# Patient Record
Sex: FEMALE | Race: WHITE | NOT HISPANIC OR LATINO | Employment: FULL TIME | URBAN - METROPOLITAN AREA
[De-identification: names, ages, dates, MRNs, and addresses within clinical notes are randomized per-mention and may not be internally consistent; named-entity substitution may affect disease eponyms.]

---

## 2015-08-07 LAB
CHOLESTEROL, TOTAL: 191
HDLC SERPL-MCNC: 51 MG/DL
LDLC SERPL CALC-MCNC: 115 MG/DL
TRIGLYCERIDE (LIPID PAN): 127

## 2018-09-19 ENCOUNTER — OFFICE VISIT (OUTPATIENT)
Dept: INTERNAL MEDICINE | Facility: CLINIC | Age: 53
End: 2018-09-19
Payer: COMMERCIAL

## 2018-09-19 VITALS
SYSTOLIC BLOOD PRESSURE: 120 MMHG | TEMPERATURE: 98 F | HEIGHT: 61 IN | BODY MASS INDEX: 27.06 KG/M2 | DIASTOLIC BLOOD PRESSURE: 76 MMHG | WEIGHT: 143.31 LBS | HEART RATE: 64 BPM

## 2018-09-19 DIAGNOSIS — Z00.00 ROUTINE GENERAL MEDICAL EXAMINATION AT A HEALTH CARE FACILITY: Primary | ICD-10-CM

## 2018-09-19 PROCEDURE — 99386 PREV VISIT NEW AGE 40-64: CPT | Mod: S$GLB,,, | Performed by: FAMILY MEDICINE

## 2018-09-19 PROCEDURE — 99999 PR PBB SHADOW E&M-NEW PATIENT-LVL III: CPT | Mod: PBBFAC,,, | Performed by: FAMILY MEDICINE

## 2018-09-19 RX ORDER — MINERAL OIL
180 ENEMA (ML) RECTAL DAILY
COMMUNITY

## 2018-09-19 RX ORDER — ESTRADIOL 1 MG/1
1 TABLET ORAL DAILY
COMMUNITY
End: 2022-08-17 | Stop reason: ALTCHOICE

## 2018-09-19 NOTE — PROGRESS NOTES
Subjective:      Patient ID: Alma Vivar is a 52 y.o. female.    Chief Complaint: Establish Care    Disclaimer:  This note is prepared using voice recognition software and as such is likely to have errors and has not been proof read. Please contact me for questions.     Alma Vivar is a 52 y.o. female who presents today to establish care. Recently moved from [place]. Works as  and travels a lot and moves a lot. Has met with Dr Hailee Laureano last year for gyn and to do HRT. Had fatigue and weight gain so did replacement therapy. Did tsh and cbc last week through [facility] with her. Doesn't have results yet. Did colonoscopy about 2 yrs ago and had polyps and due for repeat next year. Had 3 yr interval.  Did mammo and pap with Dr Laureano. Did thyroid testing there also and anemia but no results back. Back exercising crossfit. Recently go yellow fever, hep a and Tdap update to travel with [place] through company at work on 8/2018.         No results found for: WBC, HGB, HCT, PLT, CHOL, TRIG, HDL, LDLDIRECT, ALT, AST, NA, K, CL, CREATININE, BUN, CO2, TSH, PSA, INR, GLUF, HGBA1C, MICROALBUR    Review of Systems   Constitutional: Positive for activity change, fatigue and unexpected weight change. Negative for appetite change, chills and fever.   HENT: Negative for ear pain and trouble swallowing.    Eyes: Negative for pain and visual disturbance.   Respiratory: Negative for cough and shortness of breath.    Cardiovascular: Negative for chest pain and leg swelling.   Gastrointestinal: Negative for abdominal pain, blood in stool, nausea and vomiting.   Endocrine: Negative for cold intolerance and heat intolerance.   Genitourinary: Negative for dysuria and frequency.   Musculoskeletal: Negative for joint swelling, myalgias and neck pain.   Skin: Negative for color change and rash.   Neurological: Negative for dizziness and headaches.   Psychiatric/Behavioral: Negative for behavioral problems and sleep  "disturbance.     Objective:     Vitals:    09/19/18 0751   BP: 120/76   Pulse: 64   Temp: 98 °F (36.7 °C)   TempSrc: Tympanic   Weight: 65 kg (143 lb 4.8 oz)   Height: 5' 1" (1.549 m)     Physical Exam   Constitutional: She is oriented to person, place, and time. She appears well-developed and well-nourished.   HENT:   Head: Normocephalic and atraumatic.   Right Ear: External ear normal.   Left Ear: External ear normal.   Mouth/Throat: Oropharynx is clear and moist.   Eyes: EOM are normal.   Neck: Normal range of motion. Neck supple. No thyromegaly present.   Cardiovascular: Normal rate and regular rhythm. Exam reveals no gallop and no friction rub.   No murmur heard.  Pulmonary/Chest: Effort normal. No respiratory distress. She has no wheezes. She has no rales.   Abdominal: Soft. Bowel sounds are normal. She exhibits no distension. There is no tenderness. There is no rebound.   Musculoskeletal: Normal range of motion. She exhibits no edema.   Lymphadenopathy:     She has no cervical adenopathy.   Neurological: She is alert and oriented to person, place, and time.   Skin: Skin is warm and dry. No rash noted.   Psychiatric: She has a normal mood and affect. Her behavior is normal. Judgment and thought content normal.   Vitals reviewed.    Assessment:     1. Routine general medical examination at a health care facility      Plan:   Alma was seen today for establish care.    Diagnoses and all orders for this visit:    Routine general medical examination at a health care facility - labs ordered through [facility]. Await results. Get copies of old records and mammo through Dr Laureano's office. . Discussed Health Maintenance issues.     -     Lipid panel; Future  -     Comprehensive metabolic panel; Future  -     Hemoglobin A1c; Future  -     Lipid panel  -     Comprehensive metabolic panel  -     Hemoglobin A1c            Follow-up in about 1 year (around 9/19/2019) for physical with Dr THAYER.                "

## 2018-09-21 DIAGNOSIS — Z12.39 BREAST CANCER SCREENING: ICD-10-CM

## 2018-10-09 ENCOUNTER — TELEPHONE (OUTPATIENT)
Dept: INTERNAL MEDICINE | Facility: CLINIC | Age: 53
End: 2018-10-09

## 2018-10-09 NOTE — TELEPHONE ENCOUNTER
patient dropped off some med records placed for Dr Claudette kelley.      By Jose Monsivais MA

## 2018-11-06 ENCOUNTER — TELEPHONE (OUTPATIENT)
Dept: INTERNAL MEDICINE | Facility: CLINIC | Age: 53
End: 2018-11-06

## 2018-11-06 NOTE — TELEPHONE ENCOUNTER
----- Message from Almaz Castillo sent at 11/6/2018 10:10 AM CST -----  Contact: self [phone]  Would like to be worked in for an appointment this week with Dr. Wilkins for joint pain and swelling.  Please call back at [phone].  Md Morena

## 2018-11-07 ENCOUNTER — OFFICE VISIT (OUTPATIENT)
Dept: INTERNAL MEDICINE | Facility: CLINIC | Age: 53
End: 2018-11-07
Payer: COMMERCIAL

## 2018-11-07 ENCOUNTER — HOSPITAL ENCOUNTER (OUTPATIENT)
Dept: RADIOLOGY | Facility: HOSPITAL | Age: 53
Discharge: HOME | End: 2018-11-07
Attending: NURSE PRACTITIONER
Payer: COMMERCIAL

## 2018-11-07 VITALS
RESPIRATION RATE: 16 BRPM | HEART RATE: 76 BPM | WEIGHT: 142 LBS | TEMPERATURE: 97 F | SYSTOLIC BLOOD PRESSURE: 130 MMHG | DIASTOLIC BLOOD PRESSURE: 80 MMHG | HEIGHT: 61 IN | BODY MASS INDEX: 26.81 KG/M2

## 2018-11-07 DIAGNOSIS — M25.50 PAIN IN JOINT INVOLVING MULTIPLE SITES: ICD-10-CM

## 2018-11-07 DIAGNOSIS — M25.50 PAIN IN JOINT INVOLVING MULTIPLE SITES: Primary | ICD-10-CM

## 2018-11-07 PROCEDURE — 3008F BODY MASS INDEX DOCD: CPT | Mod: CPTII,S$GLB,, | Performed by: NURSE PRACTITIONER

## 2018-11-07 PROCEDURE — 99214 OFFICE O/P EST MOD 30 MIN: CPT | Mod: S$GLB,,, | Performed by: NURSE PRACTITIONER

## 2018-11-07 PROCEDURE — 73130 X-RAY EXAM OF HAND: CPT | Mod: TC,FY,PO,LT

## 2018-11-07 PROCEDURE — 99999 PR PBB SHADOW E&M-EST. PATIENT-LVL III: CPT | Mod: PBBFAC,,, | Performed by: NURSE PRACTITIONER

## 2018-11-07 PROCEDURE — 73130 X-RAY EXAM OF HAND: CPT | Mod: 26,LT,, | Performed by: RADIOLOGY

## 2018-11-07 NOTE — PROGRESS NOTES
"Subjective:       Patient ID: Alma Vivar is a 52 y.o. female.    Chief Complaint: Pain (joint pain)    Patient comes in today with concern of multiple joint pains, for the last several days. She also has had hand pain bilaterally for about a month, left worse than right. She admits to having gained about 15 pounds over the last few years. Her hands have become swollen over the last few months so much so that she is not able to wear her rings. She had to have her wedding ring cut off. The left hand feels stiff with decreased  and strength. She tried ibuprofen with no improvement.         /80 (BP Location: Left arm, Patient Position: Sitting, BP Method: Medium (Automatic))   Pulse 76   Temp 96.6 °F (35.9 °C) (Tympanic)   Resp 16   Ht 5' 1" (1.549 m)   Wt 64.4 kg (141 lb 15.6 oz)   BMI 26.83 kg/m²     Review of Systems   Constitutional: Negative for activity change, appetite change, chills, diaphoresis, fatigue, fever and unexpected weight change.   HENT: Negative.    Eyes: Negative for visual disturbance.   Respiratory: Negative for cough, shortness of breath and wheezing.    Cardiovascular: Negative for chest pain, palpitations and leg swelling.   Gastrointestinal: Negative for abdominal distention, abdominal pain, blood in stool, constipation, diarrhea, nausea and vomiting.   Genitourinary: Negative for decreased urine volume, difficulty urinating, dysuria, frequency, hematuria and urgency.   Musculoskeletal: Positive for arthralgias and joint swelling (hands). Negative for back pain, gait problem, myalgias, neck pain and neck stiffness.   Skin: Negative for color change, pallor, rash and wound.   Neurological: Negative.  Negative for dizziness, syncope, speech difficulty, light-headedness and headaches.   Psychiatric/Behavioral: Negative for agitation, confusion and hallucinations. The patient is not nervous/anxious.        Objective:      Physical Exam   Constitutional: She is oriented " to person, place, and time. She appears well-developed and well-nourished. She is cooperative. No distress.   HENT:   Head: Normocephalic and atraumatic.   Eyes: Conjunctivae are normal. Right eye exhibits no discharge. Left eye exhibits no discharge.   Cardiovascular: Normal rate, regular rhythm and normal heart sounds.   No murmur heard.  Pulmonary/Chest: Effort normal and breath sounds normal. No respiratory distress. She has no wheezes. She has no rales. She exhibits no tenderness.   Abdominal: Soft. She exhibits no distension.   Musculoskeletal:   Bilateral hands with metacarpal pain, ttp. There is decreased  strength to the left compared to right. Fingers with mild swelling. There is generalized tenderness to the wrists, shoulders, knees, elbows. No obvious joint swelling besides the hand.   Neurological: She is alert and oriented to person, place, and time.   Skin: Skin is warm and dry. No rash noted. She is not diaphoretic.   Psychiatric: She has a normal mood and affect. Her behavior is normal. Judgment and thought content normal.   Nursing note and vitals reviewed.      Assessment:       1. Pain in joint involving multiple sites        Plan:       Alma was seen today for pain.    Diagnoses and all orders for this visit:    Pain in joint involving multiple sites  -     SAM; Future  -     Comprehensive metabolic panel; Future  -     C-reactive protein; Future  -     Rheumatoid factor; Future  -     Sedimentation rate; Future  -     Uric acid; Future  -     X-Ray Hand Complete Left; Future    will do a autoimmune work up to assess for inflammatory joint diseases.  Xray left hand today as it has the greatest pain to r/o joint erosions/arthritis/degenerative changes.  Ibuprofen prn  Regular exercise  Weight loss  Will call with results.

## 2019-03-04 ENCOUNTER — OFFICE VISIT (OUTPATIENT)
Dept: INTERNAL MEDICINE | Facility: CLINIC | Age: 54
End: 2019-03-04
Payer: COMMERCIAL

## 2019-03-04 VITALS
HEART RATE: 64 BPM | SYSTOLIC BLOOD PRESSURE: 118 MMHG | BODY MASS INDEX: 24.53 KG/M2 | TEMPERATURE: 97 F | DIASTOLIC BLOOD PRESSURE: 70 MMHG | HEIGHT: 63 IN | WEIGHT: 138.44 LBS | RESPIRATION RATE: 18 BRPM

## 2019-03-04 DIAGNOSIS — R05.9 COUGH: ICD-10-CM

## 2019-03-04 DIAGNOSIS — J01.40 ACUTE NON-RECURRENT PANSINUSITIS: Primary | ICD-10-CM

## 2019-03-04 PROCEDURE — 96372 THER/PROPH/DIAG INJ SC/IM: CPT | Mod: S$GLB,,, | Performed by: NURSE PRACTITIONER

## 2019-03-04 PROCEDURE — 99999 PR PBB SHADOW E&M-EST. PATIENT-LVL IV: ICD-10-PCS | Mod: PBBFAC,,, | Performed by: NURSE PRACTITIONER

## 2019-03-04 PROCEDURE — 99999 PR PBB SHADOW E&M-EST. PATIENT-LVL IV: CPT | Mod: PBBFAC,,, | Performed by: NURSE PRACTITIONER

## 2019-03-04 PROCEDURE — 99214 OFFICE O/P EST MOD 30 MIN: CPT | Mod: 25,S$GLB,, | Performed by: NURSE PRACTITIONER

## 2019-03-04 PROCEDURE — 99214 PR OFFICE/OUTPT VISIT, EST, LEVL IV, 30-39 MIN: ICD-10-PCS | Mod: 25,S$GLB,, | Performed by: NURSE PRACTITIONER

## 2019-03-04 PROCEDURE — 3008F PR BODY MASS INDEX (BMI) DOCUMENTED: ICD-10-PCS | Mod: CPTII,S$GLB,, | Performed by: NURSE PRACTITIONER

## 2019-03-04 PROCEDURE — 96372 PR INJECTION,THERAP/PROPH/DIAG2ST, IM OR SUBCUT: ICD-10-PCS | Mod: S$GLB,,, | Performed by: NURSE PRACTITIONER

## 2019-03-04 PROCEDURE — 3008F BODY MASS INDEX DOCD: CPT | Mod: CPTII,S$GLB,, | Performed by: NURSE PRACTITIONER

## 2019-03-04 RX ORDER — BETAMETHASONE SODIUM PHOSPHATE AND BETAMETHASONE ACETATE 3; 3 MG/ML; MG/ML
12 INJECTION, SUSPENSION INTRA-ARTICULAR; INTRALESIONAL; INTRAMUSCULAR; SOFT TISSUE ONCE
Status: COMPLETED | OUTPATIENT
Start: 2019-03-04 | End: 2019-03-04

## 2019-03-04 RX ORDER — FLUTICASONE PROPIONATE 50 MCG
2 SPRAY, SUSPENSION (ML) NASAL DAILY
Qty: 1 BOTTLE | Refills: 0 | Status: SHIPPED | OUTPATIENT
Start: 2019-03-04 | End: 2019-05-21

## 2019-03-04 RX ORDER — AMOXICILLIN AND CLAVULANATE POTASSIUM 875; 125 MG/1; MG/1
1 TABLET, FILM COATED ORAL 2 TIMES DAILY
Qty: 20 TABLET | Refills: 0 | Status: SHIPPED | OUTPATIENT
Start: 2019-03-04 | End: 2019-03-14

## 2019-03-04 RX ORDER — PROMETHAZINE HYDROCHLORIDE AND DEXTROMETHORPHAN HYDROBROMIDE 6.25; 15 MG/5ML; MG/5ML
5 SYRUP ORAL NIGHTLY PRN
Qty: 120 ML | Refills: 0 | Status: SHIPPED | OUTPATIENT
Start: 2019-03-04 | End: 2019-05-21

## 2019-03-04 RX ADMIN — BETAMETHASONE SODIUM PHOSPHATE AND BETAMETHASONE ACETATE 12 MG: 3; 3 INJECTION, SUSPENSION INTRA-ARTICULAR; INTRALESIONAL; INTRAMUSCULAR; SOFT TISSUE at 09:03

## 2019-03-04 NOTE — PATIENT INSTRUCTIONS
Cristina Serrano  Start Flonase Nasal Steroid Spray 2 sprays each nare daily to decrease the sinus inflammation and help dry the post nasal drip.  Drink lots of fluids  Afrin nose spray 30 and 15 min before flight

## 2019-03-04 NOTE — PROGRESS NOTES
"Subjective:       Patient ID: Alma Vivar is a 53 y.o. female.    Chief Complaint: URI    URI    This is a new problem. The current episode started in the past 7 days. The problem has been gradually worsening. There has been no fever. Associated symptoms include congestion, coughing, headaches, rhinorrhea, sinus pain, sneezing and a sore throat. Pertinent negatives include no abdominal pain, chest pain, diarrhea, dysuria, ear pain, joint pain, joint swelling, nausea, neck pain, plugged ear sensation, rash, swollen glands, vomiting or wheezing. Treatments tried: allegra daily, otc mucinex night. The treatment provided no relief.       /70 (BP Location: Right arm, Patient Position: Sitting, BP Method: Medium (Automatic))   Pulse 64   Temp 97.3 °F (36.3 °C) (Tympanic)   Resp 18   Ht 5' 3" (1.6 m)   Wt 62.8 kg (138 lb 7.2 oz)   BMI 24.53 kg/m²     Review of Systems   Constitutional: Negative for activity change, appetite change, chills, diaphoresis, fatigue, fever and unexpected weight change.   HENT: Positive for congestion, postnasal drip, rhinorrhea, sinus pressure, sinus pain, sneezing and sore throat. Negative for dental problem, drooling, ear discharge, ear pain, hearing loss, mouth sores, nosebleeds, tinnitus, trouble swallowing and voice change.    Eyes: Negative for pain, discharge and redness.   Respiratory: Positive for cough. Negative for choking, chest tightness, shortness of breath and wheezing.    Cardiovascular: Negative for chest pain, palpitations and leg swelling.   Gastrointestinal: Negative for abdominal pain, diarrhea, nausea and vomiting.   Endocrine: Negative for cold intolerance and heat intolerance.   Genitourinary: Negative for dysuria.   Musculoskeletal: Negative for arthralgias, joint pain, joint swelling, myalgias and neck pain.   Skin: Negative for rash.   Allergic/Immunologic: Positive for environmental allergies. Negative for food allergies and immunocompromised " state.   Neurological: Positive for headaches. Negative for syncope and light-headedness.       Objective:      Physical Exam   Constitutional: She is oriented to person, place, and time. She appears well-developed and well-nourished. No distress.   HENT:   Head: Normocephalic and atraumatic.   Right Ear: Tympanic membrane, external ear and ear canal normal.   Left Ear: Tympanic membrane, external ear and ear canal normal.   Nose: Mucosal edema and rhinorrhea present. Right sinus exhibits maxillary sinus tenderness and frontal sinus tenderness. Left sinus exhibits maxillary sinus tenderness and frontal sinus tenderness.   Mouth/Throat: Uvula is midline, oropharynx is clear and moist and mucous membranes are normal. No oropharyngeal exudate, posterior oropharyngeal edema or posterior oropharyngeal erythema.   Eyes: Conjunctivae are normal. Right eye exhibits no discharge. Left eye exhibits no discharge.   Neck: Normal range of motion.   Cardiovascular: Normal rate, regular rhythm and normal heart sounds.   No murmur heard.  Pulmonary/Chest: Effort normal and breath sounds normal. No accessory muscle usage. No respiratory distress. She has no decreased breath sounds. She has no wheezes. She has no rhonchi. She has no rales. She exhibits no tenderness.   Abdominal: Soft. She exhibits no distension.   Musculoskeletal: Normal range of motion.   Neurological: She is alert and oriented to person, place, and time.   Skin: Skin is warm and dry. She is not diaphoretic.   Psychiatric: She has a normal mood and affect. Her behavior is normal.   Nursing note and vitals reviewed.      Assessment:       1. Acute non-recurrent pansinusitis    2. Cough        Plan:       Alma was seen today for uri.    Diagnoses and all orders for this visit:    Acute non-recurrent pansinusitis  -     amoxicillin-clavulanate 875-125mg (AUGMENTIN) 875-125 mg per tablet; Take 1 tablet by mouth 2 (two) times daily. for 10 days  -     betamethasone  acetate-betamethasone sodium phosphate injection 12 mg  -     fluticasone (FLONASE) 50 mcg/actuation nasal spray; 2 sprays (100 mcg total) by Each Nare route once daily.    Cough  -     promethazine-dextromethorphan (PROMETHAZINE-DM) 6.25-15 mg/5 mL Syrp; Take 5 mLs by mouth nightly as needed (Cough).      Patient Instructions   Cristina Serrano  Start Flonase Nasal Steroid Spray 2 sprays each nare daily to decrease the sinus inflammation and help dry the post nasal drip.  Drink lots of fluids  Afrin nose spray 30 and 15 min before flight     If symptoms worsen or fail to improve with treatment, see your Primary Care Provider or go to the nearest Emergency Room.

## 2019-05-21 ENCOUNTER — OFFICE VISIT (OUTPATIENT)
Dept: INTERNAL MEDICINE | Facility: CLINIC | Age: 54
End: 2019-05-21
Payer: COMMERCIAL

## 2019-05-21 VITALS
TEMPERATURE: 98 F | HEART RATE: 72 BPM | BODY MASS INDEX: 25.64 KG/M2 | DIASTOLIC BLOOD PRESSURE: 66 MMHG | SYSTOLIC BLOOD PRESSURE: 118 MMHG | WEIGHT: 135.81 LBS | HEIGHT: 61 IN

## 2019-05-21 DIAGNOSIS — J30.9 ALLERGIC RHINITIS, UNSPECIFIED SEASONALITY, UNSPECIFIED TRIGGER: ICD-10-CM

## 2019-05-21 DIAGNOSIS — R06.83 SNORING: ICD-10-CM

## 2019-05-21 DIAGNOSIS — R29.898 LEFT HAND WEAKNESS: Primary | ICD-10-CM

## 2019-05-21 DIAGNOSIS — E55.9 VITAMIN D DEFICIENCY: ICD-10-CM

## 2019-05-21 DIAGNOSIS — M79.89 SWELLING OF LEFT HAND: ICD-10-CM

## 2019-05-21 PROCEDURE — 3008F BODY MASS INDEX DOCD: CPT | Mod: CPTII,S$GLB,, | Performed by: FAMILY MEDICINE

## 2019-05-21 PROCEDURE — 99214 OFFICE O/P EST MOD 30 MIN: CPT | Mod: S$GLB,,, | Performed by: FAMILY MEDICINE

## 2019-05-21 PROCEDURE — 99214 PR OFFICE/OUTPT VISIT, EST, LEVL IV, 30-39 MIN: ICD-10-PCS | Mod: S$GLB,,, | Performed by: FAMILY MEDICINE

## 2019-05-21 PROCEDURE — 3008F PR BODY MASS INDEX (BMI) DOCUMENTED: ICD-10-PCS | Mod: CPTII,S$GLB,, | Performed by: FAMILY MEDICINE

## 2019-05-21 PROCEDURE — 99999 PR PBB SHADOW E&M-EST. PATIENT-LVL III: CPT | Mod: PBBFAC,,, | Performed by: FAMILY MEDICINE

## 2019-05-21 PROCEDURE — 99999 PR PBB SHADOW E&M-EST. PATIENT-LVL III: ICD-10-PCS | Mod: PBBFAC,,, | Performed by: FAMILY MEDICINE

## 2019-05-21 NOTE — PROGRESS NOTES
Subjective:      Patient ID: Alma Vivar is a 53 y.o. female.    Chief Complaint: Wrist Pain (left ) and Snoring (has gotten worse over last year)    Disclaimer:  This note is prepared using voice recognition software and as such is likely to have errors and has not been proof read. Please contact me for questions.     Patient is coming in today for few issues.  Left wrist pain and reduced strength in left wrist.  Sometimes joint swelling in particluar left hand but is right handed.   and does a lot of computer work.  Recently had blood work done no evidence of inflammatory arthropathies.  Also had an x-ray which just showed some mild degenerative changes at the 1st metacarpophalangeal joint.  No bony erosion changes.  Really feels more that she just has the reduced strength in .  No numbness.  Sometimes though she does note some swelling in her hands.  She even had to take a wedding ring off.  At times it will just ache.  Which she took ibuprofen it really got no better.  She did not use any ice or heat.  It is more of an annoyance now and she is concerned about the decreased  strength.  No prior injuries to the neck or the shoulder.    Also reports that she has a lot of snoring.  It has gotten worse over the last year to the point that her  had to sleep in different beds.  Before she had allergies all the time and she would wake up at night even a lot of symptoms but since moving from [place] to here she has not had as many allergy issues.  She has gained some weight after having her hysterectomy so she does this contributes to some.  She also recently started switching from sleeping on her side to her back as well.  This does cause more snoring issues as well.      Lab Results   Component Value Date    ALT 30 11/07/2018    AST 20 11/07/2018     11/07/2018    K 3.6 11/07/2018     11/07/2018    CREATININE 0.9 11/07/2018    BUN 12 11/07/2018    CO2 27 11/07/2018  "      X-Ray Hand Complete Left  Narrative: EXAMINATION:  XR HAND COMPLETE 3 VIEW LEFT    CLINICAL HISTORY:  . Pain in unspecified joint    TECHNIQUE:  PA, lateral, and oblique views of the left hand were performed.    COMPARISON:  None    FINDINGS:  No acute osseous abnormality.  Minimal degenerative changes at the 1st carpometacarpal joint.  No osseous erosions.  Soft tissues are unremarkable.  Impression: As above    Electronically signed by: Reji Duggan MD  Date:    11/07/2018  Time:    14:38        Review of Systems   Constitutional: Positive for unexpected weight change. Negative for activity change, appetite change and fatigue.   HENT: Positive for rhinorrhea. Negative for congestion, postnasal drip, sinus pressure, sinus pain, sneezing, sore throat, trouble swallowing and voice change.    Eyes: Negative for discharge.   Respiratory: Negative for apnea, cough, choking, shortness of breath and wheezing.    Cardiovascular: Negative for chest pain and palpitations.   Gastrointestinal: Negative for constipation, diarrhea and vomiting.   Genitourinary: Negative for difficulty urinating and hematuria.   Musculoskeletal: Positive for arthralgias and joint swelling. Negative for myalgias, neck pain and neck stiffness.        Left hand weakness   Neurological: Negative for headaches.   Psychiatric/Behavioral: Positive for sleep disturbance. Negative for behavioral problems and dysphoric mood.     Objective:     Vitals:    05/21/19 1035   BP: 118/66   Pulse: 72   Temp: 97.6 °F (36.4 °C)   TempSrc: Tympanic   Weight: 61.6 kg (135 lb 12.9 oz)   Height: 5' 1" (1.549 m)     Physical Exam   Constitutional: She is oriented to person, place, and time. She appears well-developed and well-nourished.   HENT:   Head: Normocephalic and atraumatic.   Right Ear: Tympanic membrane normal.   Left Ear: Tympanic membrane normal.   Nose: Mucosal edema and rhinorrhea present. No septal deviation. Right sinus exhibits no maxillary sinus " tenderness and no frontal sinus tenderness. Left sinus exhibits no maxillary sinus tenderness and no frontal sinus tenderness.   Mouth/Throat: Uvula is midline, oropharynx is clear and moist and mucous membranes are normal. No posterior oropharyngeal edema or posterior oropharyngeal erythema.   Eyes: Conjunctivae and EOM are normal.   Neck: Normal range of motion. Neck supple.   Cardiovascular: Normal rate and regular rhythm.   Pulmonary/Chest: Effort normal and breath sounds normal.   Musculoskeletal:        Left shoulder: She exhibits normal range of motion, no tenderness, no bony tenderness and no pain.        Left elbow: She exhibits normal range of motion and no swelling. No tenderness found.        Left wrist: She exhibits normal range of motion, no tenderness, no bony tenderness, no swelling, no effusion and no deformity.        Cervical back: She exhibits normal range of motion, no tenderness, no bony tenderness, no edema and no pain.   Neurological: She is alert and oriented to person, place, and time. She has normal strength. She displays no atrophy and no tremor. No sensory deficit. She exhibits normal muscle tone.   Normal supination pronation of the left wrist.  Negative Tinel's sign   Psychiatric: She has a normal mood and affect. Her speech is normal and behavior is normal.   Nursing note and vitals reviewed.    Assessment:     1. Left hand weakness    2. Swelling of left hand    3. Snoring    4. Allergic rhinitis, unspecified seasonality, unspecified trigger    5. Vitamin D deficiency      Plan:   Alma was seen today for wrist pain and snoring.    Diagnoses and all orders for this visit:    Left hand weakness-patient believe she is having true weakness of the left hand.  At this time we reviewed x-ray imaging we reviewed the lab workup.  At the next steps my recommendation would be to do an EMG to evaluate for any type of compression neuropathy or issues that may be coming from higher up such as  the elbow shoulder or the neck.  Follow-up based on those results.  She can also me with a physiatry is to discuss further.  She was amenable to this.  She does not find that anti-inflammatories have been beneficial.  -     EMG W/ ULTRASOUND AND NERVE CONDUCTION TEST 2 Extremities; Future    Swelling of left hand-per the patient reviewed x-rays and lab work done previously by my nurse practitioner.  Recommend EMG is a next step.  -     EMG W/ ULTRASOUND AND NERVE CONDUCTION TEST 2 Extremities; Future    Snoring-worse lately due to weight gain and positioning.  Also in the setting sees significant amount of nasal congestion edema and rhinitis.  Advised her on using breathe right strips also getting back on a nasal steroid.  Also discussed positioning at bedtime with elevation of her bed slightly and also sleeping on her side.  This is not beneficial can refer next to ENT for additional options.    Allergic rhinitis, unspecified seasonality, unspecified trigger-restart Flonase suspect this contributing to her snoring    Vitamin D deficiency-continue still on supplementation    Other orders  -     fluticasone (VERAMYST) 27.5 mcg/actuation nasal spray; 2 sprays by Nasal route once daily.            Follow up if symptoms worsen or fail to improve.    Patient Instructions       Dr Mendoza and Dr Livingston are dermatologists at [facility]   PA and NP also there.       Understanding Nasal Anatomy: Inside View  A lot happens under the surface of the nose. The bone and cartilage under the skin give the nose most of its size and shape. Other structures inside and behind the nose help you breathe. Learning the anatomy of the nose can help you better understand how the nose works.           Bone supports the upper third (bridge) of the nose. The upper cartilage supports the side of the nose. The lower cartilage adds support, width, and height. It helps shape the nostrils and the tip of the nose. Skin also helps shape the  "nose.     The nasal cavity is a hollow space behind the nose that air flows through. The septum is a thin "wall" made of cartilage and bone. It divides the inside of the nose into two chambers. The mucous membrane is thin tissue that lines the nose, sinuses, and throat. It warms and moistens the air you breathe in. It also makes the sticky mucus that helps clean the air of dust and other small particles. The turbinates on each side of the nose are curved, bony ridges lined with mucous membrane. They warm and moisten the air you breathe in. The sinuses are hollow, air-filled chambers in the bone around your nose. Mucus from the sinuses drains into the nasal cavity.  Date Last Reviewed: 11/1/2016 © 2000-2017 [email or web address]. [address] [place] [address]. All rights reserved. This information is not intended as a substitute for professional medical care. Always follow your healthcare professional's instructions.        Understanding Nasal Anatomy: Outside View    Why does your nose look the way it does? And what goes on inside a nose to let a person breathe easily? Learning the anatomy of the nose can help you better understand the answers to these questions.  The outside view  One way to tell if a nose fits well with a face (aesthetics) is to divide the face into equal thirds. The nose should fit within the middle third. Then, thinking of the nose as a triangle can help you see the best size and shape for the nose.  · From the front. Imagine a triangle starting at the top of your nose and extending to the outer corners of your mouth. Does your nose fit inside that triangle? The nose itself should also be shaped like a triangle and balance with the length and width of your face.  · From the side. Imagine a triangle with one side extending along the bridge of the nose and another extending out from the base of the nose. Does your nose fit within this triangle? The tilt of the tip should also " balance with the forehead and chin.  · From beneath. The nostrils and tip of the nose should also form a triangle.  Date Last Reviewed: 11/1/2016 © 2000-2017 [employer]. [address] [place] [address], [place] [address]. All rights reserved. This information is not intended as a substitute for professional medical care. Always follow your healthcare professional's instructions.        Surgical Treatment for Snoring and Sleep Apnea  The goal of most surgeries for breathing problems is to widen the airway. This is done by taking out or shrinking excess tissue where the mouth meets the throat. Nasal and jaw surgery can help correct nose or jaw problems that contribute to snoring and apnea. This sheet describes procedures that may be recommended for you.      UPPP Jaw surgery    UPPP (Uvulopalatopharyngoplasty)  This is the most common surgical procedure for sleep apnea. It trims the soft palate and uvula, and removes the tonsils and other tissue. It is major surgery done in a hospital. Most people go home within 24 hours.  Risks and complications of UPPP  Complications are uncommon with this procedure, but can include:  · Bleeding  · Throat pain  · Scarring  · Nasal-sounding speech  · False feeling that something is in throat  · Liquids sometimes going into nose when swallowing  LAUP (Laser-Assisted Uvulopalatoplasty)  This procedure helps relieve snoring. It may also be used in some cases of mild apnea. The doctor uses a laser or electric current to remove some of the soft palate and part or all of the uvula. This treatment may be done over several sessions in the doctors office.  Risks and complications of LAUP  The risks and complications are the same as for UPPP, but even less likely to occur.  RFA (Radiofrequency Ablation)  This procedure helps relieve snoring. The doctor uses radio waves to reduce the size of the turbinates or uvula, nearby tissue, and sometimes the back of the tongue.  Risks and  complications of RFA  · Mouth ulcer  · Nerve pain  · Swelling in airway  · Pocket of pus (abscess) on tongue  Nasal surgery  Problems in the nose can make snoring or sleep apnea worse. They can also make CPAP (a common treatment for snoring and sleep apnea) harder to use. If blockages in your nose are severe, surgery can improve the airflow. It can reduce the size of the turbinates, straighten a deviated septum, and remove any polyps (overgrowths of sinus lining).  Risks and complications of nasal surgery  · Bruising  · Bleeding  · Damage to or perforation of septum  · Dryness in nose  Jaw surgery  If your jaw sits too far back, your tongue may also be too far back. That makes the tongue more likely to block the airway when you sleep. Moving the jaw forward moves the tongue forward and widens the airway overall.  Risks and complications of jaw surgery  In some cases, the jaw does not heal in the desired position. Your doctor can tell you more about this. Other possible complications include:  · Loss of teeth or need for orthodontic treatment to realign teeth.  · Loss of feeling in jaw or teeth.  · Change in facial appearance.  More severe cases  If your apnea is severe and no other treatment helps, other kinds of surgery may help. Your doctor can tell you about them. Be sure you understand their risks as well as their benefits.  Date Last Reviewed: 8/10/2015  © 4656-2485 [employer]. [address] [place] [address], [place] [address]. All rights reserved. This information is not intended as a substitute for professional medical care. Always follow your healthcare professional's instructions.        What Are Snoring and Obstructive Sleep Apnea?  If youve ever had a stuffed-up nose, you know the feeling of trying to breathe through a very narrow passageway. This is what happens in your throat when you snore. While you sleep, structures in your throat partially block your air passage, making the passage narrow  and hard to breathe through. If the entire passage becomes blocked and you cant breathe at all, you have sleep apnea.      Snoring Obstructive sleep apnea   Snoring  If your throat structures are too large or the muscles relax too much during sleep, the air passage may be partially blocked. As air from the nose or mouth passes around this blockage, the throat structures vibrate, causing the familiar sound of snoring. At times, this sound can be so loud that snorers wake up others, or even themselves, during the night. Snoring gets worse as more and more of the air passage is blocked.  Obstructive sleep apnea  If the structures completely block the throat, air cant flow to the lungs at all. This is called apnea (meaning no breathing). Since the lungs arent getting fresh air, the brain tells the body to wake up just enough to tighten the muscles and unblock the air passage. With a loud gasp, breathing begins again. This process may be repeated over and over again throughout the night, making your sleep fragmented with a lighter stage of sleep. Even though you do not remember waking up many times during the night to a lighter sleep, you feel tired the next day. The lack of sleep and fresh air can also strain your lungs, heart, and other organs, leading to problems such as high blood pressure, heart attack, or stroke.  Problems in the nose and jaw  Problems in the structure of the nose may obstruct breathing. A crooked (deviated) septum or swollen turbinates can make snoring worse or lead to apnea. Also, a receding jaw may make the tongue sit too far back, so its more likely to block the airway when youre asleep.        Date Last Reviewed: 7/18/2015 © 2000-2017 [employer] [address] [place] [address]. All rights reserved. This information is not intended as a substitute for professional medical care. Always follow your healthcare professional's instructions.        Snoring and Sleep  Apnea: Notes for a Partner    Snoring and sleep apnea affect your life, as well as your partners. You can help in the treatment of the problem. Be supportive. Encourage your partner both to get treatment and to make the adjustments needed to follow through.  Adjusting to changes  Your partners treatment may involve making changes to certain life habits. You can help your partner make and stick with these changes. For example:  · Support and even join your partners exercise program.  · Be supportive if your partner gets CPAP (continuous positive airway pressure). He or she may feel self-conscious at first. Remind your partner to expect adjustments to CPAP before it feels just right.  · Consider joining a snoring and sleep apnea support group.  Go along to see the healthcare provider  You can give the healthcare provider the best account of your partners nighttime breathing and snoring patterns. Try to go along to healthcare providers appointments. If you cant go, write notes for your partner to give to the healthcare provider. Describe your partners snoring and sleep breathing patterns in detail.  Tips for sleeping with a snorer  Until treatment takes care of your partners snoring:  · Try to go to bed first. It may help if youre already asleep when your partner starts to snore.  · Wear earplugs to bed. A fan or other source of background noise may also help drown out snoring.   Date Last Reviewed: 8/10/2015  © 8953-6805 [employer] [address] [place] [address], [place] [address]. All rights reserved. This information is not intended as a substitute for professional medical care. Always follow your healthcare professional's instructions.        Tips to Help Prevent Snoring    Your snoring may get better if you make a few simple changes in your sleeping and waking habits. These changes might be all you need to improve or even cure your snoring, or they may work best when used along with other types of  treatment.  Sleep on your side  Sleeping on your side may keep throat tissue from blocking your air passage. This may improve or even cure snoring. But it can be hard to stop sleeping on your back. Try sewing a pocket or sock onto the back of a T-shirt or pajama top. Put a few tennis balls or a bag of unshelled nuts into this pocket or sock, then wear the shirt to bed. This will help keep you from rolling onto your back. If this doesn't work, try wearing a backpack full of foam pieces, or put a wedge-shaped pillow behind you. You can [employer] purchase devices online.  Avoid alcohol and certain medicines  Alcohol and medicines such as sedatives, sleeping pills, and antihistamines make breathing slower and more shallow. They also make your muscles relax, so structures in your throat can block your air passage. These changes can cause or worsen snoring. If you snore, avoid alcohol. Talk to your healthcare provider if you take medicines to help you sleep.  Lose weight  Too much weight can make snoring worse. Extra weight puts pressure on your neck tissues and lungs, making breathing harder. If you're overweight, ask your healthcare provider about a weight-loss program.  Exercise regularly  Exercise can help you lose weight, tone your muscles, and make your lungs work better. These changes may help improve your snoring. Ask your healthcare provider about an exercise program like walking, or something else that you enjoy.  Unblock your nose  If something blocks your nose, treating the problem may help improve snoring. Your healthcare provider can suggest medications for allergies or sinus problems. Nasal saline rinses can also open up the nasal passages.Nasal strips applied on the bridge of the nose can aid breathing. Surgery can straighten a deviated septum, reduce the size of the turbinates, or remove polyps (growths). If you smoke, try to quit because smoking makes a stuffy nose worse.  Understanding the risks of sleep  apnea  In some cases, snoring is not physically harmful, but it can be associated with a more serious condition called sleep apnea. Some common symptoms of sleep apnea include:  · Loud, frequent snoring  · Heavy daytime drowsiness  · Difficulty breathing during sleep  · Headaches upon awakening  If you are concerned that you might have sleep apnea, talk with your healthcare provider about tests and treatments that may help.   Date Last Reviewed: 8/9/2015  © 6756-1211 "[employer] [address] [place] [address], [place] [address]. All rights reserved. This information is not intended as a substitute for professional medical care. Always follow your healthcare professional's instructions.

## 2019-05-21 NOTE — PATIENT INSTRUCTIONS
"  Dr Mendoza and Dr Livingston are dermatologists at [facility]   PA and NP also there.       Understanding Nasal Anatomy: Inside View  A lot happens under the surface of the nose. The bone and cartilage under the skin give the nose most of its size and shape. Other structures inside and behind the nose help you breathe. Learning the anatomy of the nose can help you better understand how the nose works.           Bone supports the upper third (bridge) of the nose. The upper cartilage supports the side of the nose. The lower cartilage adds support, width, and height. It helps shape the nostrils and the tip of the nose. Skin also helps shape the nose.     The nasal cavity is a hollow space behind the nose that air flows through. The septum is a thin "wall" made of cartilage and bone. It divides the inside of the nose into two chambers. The mucous membrane is thin tissue that lines the nose, sinuses, and throat. It warms and moistens the air you breathe in. It also makes the sticky mucus that helps clean the air of dust and other small particles. The turbinates on each side of the nose are curved, bony ridges lined with mucous membrane. They warm and moisten the air you breathe in. The sinuses are hollow, air-filled chambers in the bone around your nose. Mucus from the sinuses drains into the nasal cavity.  Date Last Reviewed: 11/1/2016 © 2000-2017 [employer]. [address] [place] [address]. All rights reserved. This information is not intended as a substitute for professional medical care. Always follow your healthcare professional's instructions.        Understanding Nasal Anatomy: Outside View    Why does your nose look the way it does? And what goes on inside a nose to let a person breathe easily? Learning the anatomy of the nose can help you better understand the answers to these questions.  The outside view  One way to tell if a nose fits well with a face (aesthetics) is to divide the " face into equal thirds. The nose should fit within the middle third. Then, thinking of the nose as a triangle can help you see the best size and shape for the nose.  · From the front. Imagine a triangle starting at the top of your nose and extending to the outer corners of your mouth. Does your nose fit inside that triangle? The nose itself should also be shaped like a triangle and balance with the length and width of your face.  · From the side. Imagine a triangle with one side extending along the bridge of the nose and another extending out from the base of the nose. Does your nose fit within this triangle? The tilt of the tip should also balance with the forehead and chin.  · From beneath. The nostrils and tip of the nose should also form a triangle.  Date Last Reviewed: 11/1/2016 © 2000-2017 [employer]. [address] [place] [address]. All rights reserved. This information is not intended as a substitute for professional medical care. Always follow your healthcare professional's instructions.        Surgical Treatment for Snoring and Sleep Apnea  The goal of most surgeries for breathing problems is to widen the airway. This is done by taking out or shrinking excess tissue where the mouth meets the throat. Nasal and jaw surgery can help correct nose or jaw problems that contribute to snoring and apnea. This sheet describes procedures that may be recommended for you.      UPPP Jaw surgery    UPPP (Uvulopalatopharyngoplasty)  This is the most common surgical procedure for sleep apnea. It trims the soft palate and uvula, and removes the tonsils and other tissue. It is major surgery done in a hospital. Most people go home within 24 hours.  Risks and complications of UPPP  Complications are uncommon with this procedure, but can include:  · Bleeding  · Throat pain  · Scarring  · Nasal-sounding speech  · False feeling that something is in throat  · Liquids sometimes going into nose when  swallowing  LAUP (Laser-Assisted Uvulopalatoplasty)  This procedure helps relieve snoring. It may also be used in some cases of mild apnea. The doctor uses a laser or electric current to remove some of the soft palate and part or all of the uvula. This treatment may be done over several sessions in the doctors office.  Risks and complications of LAUP  The risks and complications are the same as for UPPP, but even less likely to occur.  RFA (Radiofrequency Ablation)  This procedure helps relieve snoring. The doctor uses radio waves to reduce the size of the turbinates or uvula, nearby tissue, and sometimes the back of the tongue.  Risks and complications of RFA  · Mouth ulcer  · Nerve pain  · Swelling in airway  · Pocket of pus (abscess) on tongue  Nasal surgery  Problems in the nose can make snoring or sleep apnea worse. They can also make CPAP (a common treatment for snoring and sleep apnea) harder to use. If blockages in your nose are severe, surgery can improve the airflow. It can reduce the size of the turbinates, straighten a deviated septum, and remove any polyps (overgrowths of sinus lining).  Risks and complications of nasal surgery  · Bruising  · Bleeding  · Damage to or perforation of septum  · Dryness in nose  Jaw surgery  If your jaw sits too far back, your tongue may also be too far back. That makes the tongue more likely to block the airway when you sleep. Moving the jaw forward moves the tongue forward and widens the airway overall.  Risks and complications of jaw surgery  In some cases, the jaw does not heal in the desired position. Your doctor can tell you more about this. Other possible complications include:  · Loss of teeth or need for orthodontic treatment to realign teeth.  · Loss of feeling in jaw or teeth.  · Change in facial appearance.  More severe cases  If your apnea is severe and no other treatment helps, other kinds of surgery may help. Your doctor can tell you about them. Be sure you  understand their risks as well as their benefits.  Date Last Reviewed: 8/10/2015  © 8634-9660 [employer]. [address] [place] [address], [place] [address]. All rights reserved. This information is not intended as a substitute for professional medical care. Always follow your healthcare professional's instructions.        What Are Snoring and Obstructive Sleep Apnea?  If youve ever had a stuffed-up nose, you know the feeling of trying to breathe through a very narrow passageway. This is what happens in your throat when you snore. While you sleep, structures in your throat partially block your air passage, making the passage narrow and hard to breathe through. If the entire passage becomes blocked and you cant breathe at all, you have sleep apnea.      Snoring Obstructive sleep apnea   Snoring  If your throat structures are too large or the muscles relax too much during sleep, the air passage may be partially blocked. As air from the nose or mouth passes around this blockage, the throat structures vibrate, causing the familiar sound of snoring. At times, this sound can be so loud that snorers wake up others, or even themselves, during the night. Snoring gets worse as more and more of the air passage is blocked.  Obstructive sleep apnea  If the structures completely block the throat, air cant flow to the lungs at all. This is called apnea (meaning no breathing). Since the lungs arent getting fresh air, the brain tells the body to wake up just enough to tighten the muscles and unblock the air passage. With a loud gasp, breathing begins again. This process may be repeated over and over again throughout the night, making your sleep fragmented with a lighter stage of sleep. Even though you do not remember waking up many times during the night to a lighter sleep, you feel tired the next day. The lack of sleep and fresh air can also strain your lungs, heart, and other organs, leading to problems such as high  blood pressure, heart attack, or stroke.  Problems in the nose and jaw  Problems in the structure of the nose may obstruct breathing. A crooked (deviated) septum or swollen turbinates can make snoring worse or lead to apnea. Also, a receding jaw may make the tongue sit too far back, so its more likely to block the airway when youre asleep.        Date Last Reviewed: 7/18/2015  © 8623-1255 [employer]. [address] [place] [address], [place] [address]. All rights reserved. This information is not intended as a substitute for professional medical care. Always follow your healthcare professional's instructions.        Snoring and Sleep Apnea: Notes for a Partner    Snoring and sleep apnea affect your life, as well as your partners. You can help in the treatment of the problem. Be supportive. Encourage your partner both to get treatment and to make the adjustments needed to follow through.  Adjusting to changes  Your partners treatment may involve making changes to certain life habits. You can help your partner make and stick with these changes. For example:  · Support and even join your partners exercise program.  · Be supportive if your partner gets CPAP (continuous positive airway pressure). He or she may feel self-conscious at first. Remind your partner to expect adjustments to CPAP before it feels just right.  · Consider joining a snoring and sleep apnea support group.  Go along to see the healthcare provider  You can give the healthcare provider the best account of your partners nighttime breathing and snoring patterns. Try to go along to healthcare providers appointments. If you cant go, write notes for your partner to give to the healthcare provider. Describe your partners snoring and sleep breathing patterns in detail.  Tips for sleeping with a snorer  Until treatment takes care of your partners snoring:  · Try to go to bed first. It may help if youre already asleep when your partner starts to  snore.  · Wear earplugs to bed. A fan or other source of background noise may also help drown out snoring.   Date Last Reviewed: 8/10/2015  © 5802-9628 [employer]. [address] [place] [address], [place] [address]. All rights reserved. This information is not intended as a substitute for professional medical care. Always follow your healthcare professional's instructions.        Tips to Help Prevent Snoring    Your snoring may get better if you make a few simple changes in your sleeping and waking habits. These changes might be all you need to improve or even cure your snoring, or they may work best when used along with other types of treatment.  Sleep on your side  Sleeping on your side may keep throat tissue from blocking your air passage. This may improve or even cure snoring. But it can be hard to stop sleeping on your back. Try sewing a pocket or sock onto the back of a T-shirt or pajama top. Put a few tennis balls or a bag of unshelled nuts into this pocket or sock, then wear the shirt to bed. This will help keep you from rolling onto your back. If this doesn't work, try wearing a backpack full of foam pieces, or put a wedge-shaped pillow behind you. You can [employer] purchase devices online.  Avoid alcohol and certain medicines  Alcohol and medicines such as sedatives, sleeping pills, and antihistamines make breathing slower and more shallow. They also make your muscles relax, so structures in your throat can block your air passage. These changes can cause or worsen snoring. If you snore, avoid alcohol. Talk to your healthcare provider if you take medicines to help you sleep.  Lose weight  Too much weight can make snoring worse. Extra weight puts pressure on your neck tissues and lungs, making breathing harder. If you're overweight, ask your healthcare provider about a weight-loss program.  Exercise regularly  Exercise can help you lose weight, tone your muscles, and make your lungs work better. These  changes may help improve your snoring. Ask your healthcare provider about an exercise program like walking, or something else that you enjoy.  Unblock your nose  If something blocks your nose, treating the problem may help improve snoring. Your healthcare provider can suggest medications for allergies or sinus problems. Nasal saline rinses can also open up the nasal passages.Nasal strips applied on the bridge of the nose can aid breathing. Surgery can straighten a deviated septum, reduce the size of the turbinates, or remove polyps (growths). If you smoke, try to quit because smoking makes a stuffy nose worse.  Understanding the risks of sleep apnea  In some cases, snoring is not physically harmful, but it can be associated with a more serious condition called sleep apnea. Some common symptoms of sleep apnea include:  · Loud, frequent snoring  · Heavy daytime drowsiness  · Difficulty breathing during sleep  · Headaches upon awakening  If you are concerned that you might have sleep apnea, talk with your healthcare provider about tests and treatments that may help.   Date Last Reviewed: 8/9/2015 © 2000-2017 [employer]. [address] [place] [address], [place] [address]. All rights reserved. This information is not intended as a substitute for professional medical care. Always follow your healthcare professional's instructions.

## 2019-05-28 ENCOUNTER — TELEPHONE (OUTPATIENT)
Dept: PHYSICAL MEDICINE AND REHAB | Facility: CLINIC | Age: 54
End: 2019-05-28

## 2019-05-28 ENCOUNTER — TELEPHONE (OUTPATIENT)
Dept: INTERNAL MEDICINE | Facility: CLINIC | Age: 54
End: 2019-05-28

## 2019-06-12 ENCOUNTER — OFFICE VISIT (OUTPATIENT)
Dept: PHYSICAL MEDICINE AND REHAB | Facility: CLINIC | Age: 54
End: 2019-06-12
Payer: COMMERCIAL

## 2019-06-12 VITALS
WEIGHT: 135 LBS | HEIGHT: 61 IN | DIASTOLIC BLOOD PRESSURE: 77 MMHG | SYSTOLIC BLOOD PRESSURE: 127 MMHG | BODY MASS INDEX: 25.49 KG/M2 | RESPIRATION RATE: 16 BRPM | HEART RATE: 68 BPM

## 2019-06-12 DIAGNOSIS — G56.03 BILATERAL CARPAL TUNNEL SYNDROME: Primary | ICD-10-CM

## 2019-06-12 DIAGNOSIS — M79.89 SWELLING OF LEFT HAND: ICD-10-CM

## 2019-06-12 PROCEDURE — 99204 OFFICE O/P NEW MOD 45 MIN: CPT | Mod: 25,S$GLB,, | Performed by: PHYSICAL MEDICINE & REHABILITATION

## 2019-06-12 PROCEDURE — 99204 PR OFFICE/OUTPT VISIT, NEW, LEVL IV, 45-59 MIN: ICD-10-PCS | Mod: 25,S$GLB,, | Performed by: PHYSICAL MEDICINE & REHABILITATION

## 2019-06-12 PROCEDURE — 95913 NRV CNDJ TEST 13/> STUDIES: CPT | Mod: 26,S$GLB,, | Performed by: PHYSICAL MEDICINE & REHABILITATION

## 2019-06-12 PROCEDURE — 99999 PR PBB SHADOW E&M-EST. PATIENT-LVL III: CPT | Mod: PBBFAC,,, | Performed by: PHYSICAL MEDICINE & REHABILITATION

## 2019-06-12 PROCEDURE — 3008F PR BODY MASS INDEX (BMI) DOCUMENTED: ICD-10-PCS | Mod: CPTII,S$GLB,, | Performed by: PHYSICAL MEDICINE & REHABILITATION

## 2019-06-12 PROCEDURE — 99999 PR PBB SHADOW E&M-EST. PATIENT-LVL III: ICD-10-PCS | Mod: PBBFAC,,, | Performed by: PHYSICAL MEDICINE & REHABILITATION

## 2019-06-12 PROCEDURE — 3008F BODY MASS INDEX DOCD: CPT | Mod: CPTII,S$GLB,, | Performed by: PHYSICAL MEDICINE & REHABILITATION

## 2019-06-12 PROCEDURE — 95913 PR NERVE CONDUCTION STUDY; 13 OR MORE STUDIES: ICD-10-PCS | Mod: 26,S$GLB,, | Performed by: PHYSICAL MEDICINE & REHABILITATION

## 2019-06-12 NOTE — LETTER
June 12, 2019      Candis Wilkins MD  [address] [facility]  [address]  [place] [address]           [facility]  [address] [facility]  [place] [address]  Phone: [phone]  Fax: [phone]          Patient: Alma Vivar   MR Number: [id number]   YOB: 1965   Date of Visit: 6/12/2019       Dear Dr. Candis Wilkins:    Thank you for referring Alma Vivar to me for evaluation. Attached you will find relevant portions of my assessment and plan of care.    If you have questions, please do not hesitate to call me. I look forward to following Alma Vivar along with you.    Sincerely,    Nelsy Jasso MD    Enclosure  CC:  No Recipients    If you would like to receive this communication electronically, please contact [email or web address] or [phone] to request more information on [employer] access.    For providers and/or their staff who would like to refer a patient to [facility], please contact us through our one-stop-shop provider referral line, [facility], at [phone].    If you feel you have received this communication in error or would no longer like to receive these types of communications, please e-mail [email or web address]

## 2019-06-12 NOTE — PATIENT INSTRUCTIONS
Carpal Tunnel Syndrome Prevention Tips  Some repetitive hand activities put you at higher risk for carpal tunnel syndrome (CTS). But you can reduce your risk. Learn how to change the way you use your hands. Below are tips for at home and on the job. Be sure to also follow the hand and wrist safety policies at your workplace.      Keep your wrist in a neutral (straight) position when exercising.      Keep your wrist in neutral  Keep a neutral (straight) wrist position as often as you can. Dont use your wrist in a bent (flexed) position for long periods of time. This includes extended or twisted positions.  Watch your   Dont just use your thumb and index finger to grasp or lift. This can put stress on your wrist. When you can, use your whole hand and all its fingers to grasp an object.  Minimize repetition  Dont move your arms or hands or hold an object in the same way for long periods of time. Even simple, light tasks can cause injury this way. Instead, alternate tasks or switch hands.  Rest your hands  Give your hands a break from time to time with a rest. Even a few minutes once an hour can help.  Reduce speed and force  Slow down the speed in which you do a forceful, repetitive motion. This gives your wrist time to recover from the effort. Use power tools to help reduce the force.  Strengthen the muscles  Weak muscles may lead to a poor wrist or arm position. Exercises will make your hand and arm muscles stronger. This can help you keep a better position.  Date Last Reviewed: 9/11/2015  © 9718-2431 [employer]. [address] [place] [address], [place] [address]. All rights reserved. This information is not intended as a substitute for professional medical care. Always follow your healthcare professional's instructions.        Understanding Carpal Tunnel Syndrome    The carpal tunnel is a narrow space inside the wrist. It is ringed by bone and a band of tough tissue called the transverse carpal  ligament. A major nerve called the median nerve runs from the forearm into the hand through the carpal tunnel. Tendons also run through the carpal tunnel.  With carpal tunnel syndrome, the tendons or nearby tissues within the carpal tunnel may swell or thicken. Or the transverse carpal ligament may harden and shorten. This narrows the space in the carpal tunnel and puts pressure on the median nerve. This pressure leads to tingling and numbness of the hand and wrist. In time, the condition can make even simple tasks hard to do.  What causes carpal tunnel syndrome?  Doctors arent entirely clear why the condition occurs. Certain things may make a person more likely to have it. These include:  · Being female  · Being pregnant  · Being overweight  · Having diabetes or rheumatoid arthritis  Symptoms of carpal tunnel syndrome  Symptoms often come and go. At first, symptoms may occur mainly at night. Later, they may be noticed during the day as well. They may get worse with activities such as driving, reading, typing, or holding a phone. Symptoms can include:  · Tingling and numbness in the hand or wrist  · Sharp pain that shoots up the arm or down to the fingers  · Hand stiffness or cramping, especially in the morning  · Trouble making a fist  · Hand weakness and clumsiness  Treatment for carpal tunnel syndrome  Certain treatments help reduce the pressure on the median nerve and relieve symptoms. Choices for treatment may include one or more of the following:  · Wrist splint. This involves wearing a special brace on the wrist and hand. The splint holds the wrist straight, in a neutral position. This helps keep the carpal tunnel as open as possible.  · Cortisone shots. Cortisone is a medicine that helps reduce swelling. It is injected directly into the wrist. It helps shrink tissues inside the carpal tunnel. This relieves symptoms for a time.  · Pain medicines. You may take over-the-counter or prescription medicines to  help reduce swelling and relieve symptoms.  · Surgery. If the condition doesnt respond to other treatments and doesnt go away on its own, you may need surgery. During surgery, the surgeon cuts the transverse carpal ligament to relieve pressure on the median nerve.     When to call your healthcare provider  Call your healthcare provider right away if you have any of these:  · Fever of 100.4°F (38°C) or higher, or as directed  · Symptoms that dont get better, or get worse  · New symptoms   Date Last Reviewed: 3/10/2016  © 8697-5914 [employer]. [address] [place] [address]. All rights reserved. This information is not intended as a substitute for professional medical care. Always follow your healthcare professional's instructions.        Carpal Tunnel Syndrome    Carpal tunnel syndrome is a painful condition of the wrist and arm. It is caused by pressure on the median nerve.  The median nerve is one of the nerves that give feeling and movement to the hand. It passes through a tunnel in the wrist called the carpal tunnel. This tunnel is made up of bones and ligaments. Narrowing of this tunnel or swelling of the tissues inside the tunnel puts pressure on the median nerve. This causes numbness, pins and needles, or electric shooting pains in your hand and forearm. Often the pain is worse at night and may wake you when you are asleep.  Carpal tunnel syndrome may occur during pregnancy and with use of birth control pills. It is more common in workers who must often bend their wrists. It is also common in people who work with power tools that cause strong vibrations.  Home care  · Rest the painful wrist. Avoid repeated bending of the wrist back and forth. This puts pressure on the median nerve. Avoid using power tools with strong vibrations.  · If you were given a splint, wear it at night while you sleep. You may also wear it during the day for comfort.  · Move your fingers and wrists often to  avoid stiffness.  · Elevate your arms on pillows when you lie down.  · Try using the unaffected hand more.  · Try not to hold your wrists in a bent, downward position.  · Sometimes changes in the work place may ease symptoms. If you type most of the day, it may help to change the position of your keyboard or add a wrist support. Your wrist should be in a neutral position and not bent back when typing.  · You may use over-the-counter pain medicine to treat pain and inflammation, unless another medicine was prescribed. Anti-inflammatory pain medicines, such as ibuprofen or naproxen may be more effective than acetaminophen, which treats pain, but not inflammation. If you have chronic liver or kidney disease or ever had a stomach ulcer or GI bleeding, talk with your doctor before using these medicines.  · Opioid pain medicine will only give temporary relief and does not treat the problem. If pain continues, you may need a shot of a steroid drug into your wrist.  · If the above methods fail, you may need surgery. This will open the carpal tunnel and release the pressure on the trapped nerve.  Follow-up care  Follow up with your healthcare provider, or as advised, if the pain doesnt begin to improve within the next week.  If X-rays were taken, you will be notified of any new findings that may affect your care.  When to seek medical advice  Call your healthcare provider right away if any of these occur:  · Pain not improving with the above treatment  · Fingers or hand become cold, blue, numb, or tingly  · Your whole arm becomes swollen or weak  Date Last Reviewed: 11/23/2015  © 7383-9306 [employer]. [address] [place] [address], [place] [address]. All rights reserved. This information is not intended as a substitute for professional medical care. Always follow your healthcare professional's instructions.

## 2019-06-12 NOTE — PROGRESS NOTES
[facility]   [address]  [place] [address]  Phone: [phone]        Full Name: ck reaves YOB: 1965  Patient ID: [id number]      Visit Date: 6/12/2019 14:12  Age: 53 Years 5 Months Old  Examining Physician: Nelsy Jasso M.D.  Referring Physician:   Reason for Referral: ue weakness        Chief Complaint   Patient presents with    Extremity Weakness     left hand       HPI: This is a 53 y.o.  female being seen in clinic today for evaluation of left hand weakness and with associated swelling that began a few weeks ago.  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  Her symptoms initially started with swelling but then she felt some weakness of  strength.  She has been worked up by PCP for arthritic issues and denies numbness or significant pain.  At time she had discomfort radiation up from her wrist to her forearm.     History obtained from patient    Past family, medical, social, and surgical history reviewed in chart    Review of Systems:     General- denies lethargy, weight change, fever, chills  Head/neck- denies swallowing difficulties  ENT- denies hearing changes  Cardiovascular-denies chest pain  Pulmonary- denies shortness of breath  GI- denies constipation or bowel incontinence  - denies bladder incontinence  Skin- denies wounds or rashes  Musculoskeletal- +weakness, +/-pain  Neurologic- denies numbness and tingling  Psychiatric- denies depressive or psychotic features, denies anxiety  Lymphatic-denies swelling  Endocrine- denies hypoglycemic symptoms/DM history  All other pertinent systems negative     Physical Examination:  General: Well developed, well nourished female, NAD  HEENT:NCAT EOMI bilaterally   Pulmonary:Normal respirations    Spinal Examination: CERVICAL  Active ROM is within normal limits.  Inspection: No deformity of spinal alignment.  Palpation: No vertebral tenderness to percussion.      Spinal Examination: LUMBAR or  THORACIC  Active ROM is within normal limits.  Inspection: No deformity of spinal alignment.      Musculoskeletal Tests:  Phalen: neg  Elbow compression (ulnar): neg  Tinels at wrist: neg    Bilateral Upper and Lower Extremities:  Pulses are 2+ at radial bilaterally.  Shoulder/Elbow/Wrist/Hand ROM wnl  Hip/Knee/Ankle ROM   Bilateral Extremities show normal capillary refill.  No signs of cyanosis, rubor, edema, skin changes, or dysvascular changes of appendages.  Nails appear intact.    Neurological Exam:  Cranial Nerves:  II-XII grossly intact    Manual Muscle Testing: (Motor 5=normal)  5/5 strength bilateral upper extremities    No focal atrophy is noted of either upper extremity.    Bilateral Reflexes:1+bic tric br  Chopra's response is absent bilaterally.  No clonus at knee or ankle.    Sensation: tested to light touch  - intact in arms  Gait: Narrow base and good arm swing.      Entire procedure explained to patient prior to proceeding.  Verbal consent obtained    SNC      Nerve / Sites Rec. Site Onset Lat Peak Lat Amp Segments Distance Velocity     ms ms µV  mm m/s   L Median - Digit II (Antidromic)      Wrist Dig II 2.5 3.2 15.1 Wrist - Dig  56   R Median - Digit II (Antidromic)      Wrist Dig II 2.6 3.3 15.1 Wrist - Dig  55   L Ulnar - Digit V (Antidromic)      Wrist Dig V 2.3 3.0 18.0 Wrist - Dig V 140 61   R Ulnar - Digit V (Antidromic)      Wrist Dig V 2.1 3.0 16.3 Wrist - Dig V 140 66   L Radial - Anatomical snuff box (Forearm)      Forearm Wrist 1.7 2.0 15.8 Forearm - Wrist 100 60   R Radial - Anatomical snuff box (Forearm)      Forearm Wrist 1.5 1.9 11.6 Forearm - Wrist 100 66       CSI      Nerve / Sites Rec. Site Peak Lat NP Amp Segments Peak Diff     ms µV  ms   L Median - CSI      Median Thumb 2.6 18.6 Median - Radial 0.4      Radial Thumb 2.1 16.7 Median - Ulnar 0.4      Median Ring 3.3 13.3 Median palm - Ulnar palm 0.3      Ulnar Ring 2.9 19.1        Median palm Wrist 1.8 69.8         Ulnar palm Wrist 1.5 25.0        CSI    CSI 1.1   R Median - CSI      Median Thumb 2.8 12.9 Median - Radial 0.7      Radial Thumb 2.0 9.8 Median - Ulnar 0.4      Median Ring 3.2 15.2 Median palm - Ulnar palm       Ulnar Ring 2.9 16.6        CSI    CSI 1.1       MNC      Nerve / Sites Muscle Latency Amplitude Duration Rel Amp Segments Distance Lat Diff Velocity     ms mV ms %  mm ms m/s   L Median - APB      Wrist APB 3.1 7.3 5.5 100 Wrist - APB 80        Elbow APB 6.7 7.7 5.9 105 Elbow - Wrist 190 3.6 52   R Median - APB      Wrist APB 3.1 8.3 5.1 100 Wrist - APB 80        Elbow APB 7.1 6.5 5.4 78.5 Elbow - Wrist 220 4.0 55   L Ulnar - ADM      Wrist ADM 2.7 8.8 5.9 100 Wrist - ADM 80        B.Elbow ADM 6.0 7.4 6.4 84.2 B.Elbow - Wrist 200 3.3 61      A.Elbow ADM 8.3 7.4 6.5 100 A.Elbow - B.Elbow 130 2.3 57         A.Elbow - Wrist  5.6    R Ulnar - ADM      Wrist ADM 2.7 8.3 5.5 100 Wrist - ADM 80        B.Elbow ADM 6.2 8.3 5.5 99.3 B.Elbow - Wrist 210 3.5 60      A.Elbow ADM 8.0 8.1 5.6 97.7 A.Elbow - B.Elbow 110 1.8 60         A.Elbow - Wrist  5.3                                         INTERPRETATION  -Bilateral median motor nerve conduction study showed normal latency, amplitude, and conduction velocity  -Bilateral median sensory nerve conduction study showed normal peak latency and amplitude  -Bilateral ulnar motor nerve conduction study showed normal latency, amplitude, and conduction velocity  -Bilateral ulnar sensory nerve conduction study showed normal peak latency and amplitude  -Bilateral radial sensory nerve conduction study showed normal peak latency and amplitude  -Bilateral combined sensory index showed a significant latency difference of 1.1    IMPRESSION  1. ABNORMAL study  2. There is electrodiagnostic evidence of a MILD demyelinating median neuropathy (Carpal tunnel syndrome) across BILATERAL wrists    PLAN  1. Follow up with referring provider: Dr. Candis Wilkins  2.  Handouts on CTS prevention  and neutral wrist braces provided. Consider low dose gabapentin or OT for hands.  3. This study is good for one year. If symptoms worsen or do not improve, please re-consult.    Nelsy Jasso M.D.  Physical Medicine and Rehab

## 2019-06-17 ENCOUNTER — PATIENT OUTREACH (OUTPATIENT)
Dept: ADMINISTRATIVE | Facility: HOSPITAL | Age: 54
End: 2019-06-17

## 2020-03-18 ENCOUNTER — TELEPHONE (OUTPATIENT)
Dept: INTERNAL MEDICINE | Facility: CLINIC | Age: 55
End: 2020-03-18

## 2020-03-18 NOTE — TELEPHONE ENCOUNTER
"Pt states that she is having her "annual sinus infection", she was treating herself for her allergies but now it has turned into the sinus. Can you send her in something or do you want her to be seen?   "

## 2020-03-18 NOTE — TELEPHONE ENCOUNTER
----- Message from Jack Shay sent at 3/18/2020 11:44 AM CDT -----  Contact: [phone]  .Type:  Needs Medical Advice    Who Called: Alma Vivar  Symptoms (please be specific): Head stuffiness /sinus pressure /stuffy nose    How long has patient had these symptoms:  2-3 days   Pharmacy name and phone #:  .  [id number] - [place], [place] - [address] [address] AT [address] [facility] [address]  [address] [address] [address]  Phone: [phone] Fax: [phone]      Would the patient rather a call back or a response via [facility]? Call back   Best Call Back Number: .[phone] (home)   Additional Information:

## 2020-03-18 NOTE — TELEPHONE ENCOUNTER
She can do telemed visit with me or any primary care or urgent care or [facility] anywhere care for this.

## 2020-03-19 ENCOUNTER — OFFICE VISIT (OUTPATIENT)
Dept: INTERNAL MEDICINE | Facility: CLINIC | Age: 55
End: 2020-03-19
Payer: COMMERCIAL

## 2020-03-19 DIAGNOSIS — J01.00 ACUTE NON-RECURRENT MAXILLARY SINUSITIS: Primary | ICD-10-CM

## 2020-03-19 PROCEDURE — 99213 PR OFFICE/OUTPT VISIT, EST, LEVL III, 20-29 MIN: ICD-10-PCS | Mod: 95,,, | Performed by: INTERNAL MEDICINE

## 2020-03-19 PROCEDURE — 99213 OFFICE O/P EST LOW 20 MIN: CPT | Mod: 95,,, | Performed by: INTERNAL MEDICINE

## 2020-03-19 RX ORDER — AZITHROMYCIN 250 MG/1
TABLET, FILM COATED ORAL
Qty: 6 TABLET | Refills: 0 | Status: SHIPPED | OUTPATIENT
Start: 2020-03-19 | End: 2022-07-24

## 2020-03-19 NOTE — PROGRESS NOTES
Subjective:       Patient ID: Alma Vivar is a 54 y.o. female.    Chief Complaint: No chief complaint on file.    History of allergies.  Started as typical allergies but progressed to painful sinuses. Swollen glands in neck.  No fever, no cough.    Sore Throat    This is a new problem. The current episode started in the past 7 days. The problem has been gradually worsening. Neither side of throat is experiencing more pain than the other. There has been no fever. The pain is at a severity of 2/10. The pain is mild. Associated symptoms include congestion, ear pain, a hoarse voice and swollen glands. Pertinent negatives include no abdominal pain, coughing, diarrhea, drooling, ear discharge, headaches, plugged ear sensation, neck pain, shortness of breath, stridor, trouble swallowing or vomiting. She has had no exposure to strep or mono. She has tried nothing for the symptoms. The treatment provided no relief.     The patient location is: home  The chief complaint leading to consultation is: sinus pain  Visit type: Virtual visit with synchronous audio and video  Total time spent with patient: 6  Each patient to whom he or she provides medical services by telemedicine is:  (1) informed of the relationship between the physician and patient and the respective role of any other health care provider with respect to management of the patient; and (2) notified that he or she may decline to receive medical services by telemedicine and may withdraw from such care at any time.      Review of Systems   HENT: Positive for congestion, ear pain, hoarse voice and sore throat. Negative for drooling, ear discharge and trouble swallowing.    Respiratory: Negative for cough, shortness of breath and stridor.    Gastrointestinal: Negative for abdominal pain, diarrhea and vomiting.   Musculoskeletal: Negative for neck pain.   Neurological: Negative for headaches.       Objective:   There were no vitals taken for this visit.      Physical Exam   Constitutional: She is oriented to person, place, and time. She appears well-developed and well-nourished.   HENT:   Head: Normocephalic.   Patient reports maxillayr sinus tenderness on palpation.   Eyes: Pupils are equal, round, and reactive to light.   Neck: Normal range of motion.   Pulmonary/Chest: Effort normal. No respiratory distress.   Neurological: She is alert and oriented to person, place, and time. No cranial nerve deficit.   Skin: No rash noted.   Psychiatric: She has a normal mood and affect. Her behavior is normal. Thought content normal.           Assessment:       1. Acute non-recurrent maxillary sinusitis        Plan:   No problem-specific Assessment & Plan notes found for this encounter.    Acute non-recurrent maxillary sinusitis  Comments:  zpak as directed.  May use sinus lavage.  Continue current therapy  Orders:  -     azithromycin (ZITHROMAX Z-RUPERTO) 250 MG tablet; 2 tablets today then one tablet daily for 4 days  Dispense: 6 tablet; Refill: 0    I discussed with the patient the most recent information surrounding the corona virus.  We discussed how it is spread.  I recommended continued focus on hand washing, the use of hand  is as necessary and avoidance of touching the face.     Recommendations regarding the need for visits to the clinic and or hospital were discussed.

## 2020-06-18 ENCOUNTER — OFFICE VISIT (OUTPATIENT)
Dept: INTERNAL MEDICINE | Facility: CLINIC | Age: 55
End: 2020-06-18
Payer: COMMERCIAL

## 2020-06-18 ENCOUNTER — LAB VISIT (OUTPATIENT)
Dept: INTERNAL MEDICINE | Facility: CLINIC | Age: 55
End: 2020-06-18
Payer: COMMERCIAL

## 2020-06-18 DIAGNOSIS — R05.9 COUGH: ICD-10-CM

## 2020-06-18 DIAGNOSIS — R51.9 HEAD ACHE: ICD-10-CM

## 2020-06-18 DIAGNOSIS — J06.9 ACUTE URI: Primary | ICD-10-CM

## 2020-06-18 DIAGNOSIS — R50.9 FEVER: ICD-10-CM

## 2020-06-18 PROCEDURE — 99213 PR OFFICE/OUTPT VISIT, EST, LEVL III, 20-29 MIN: ICD-10-PCS | Mod: 95,,, | Performed by: PHYSICIAN ASSISTANT

## 2020-06-18 PROCEDURE — U0003 INFECTIOUS AGENT DETECTION BY NUCLEIC ACID (DNA OR RNA); SEVERE ACUTE RESPIRATORY SYNDROME CORONAVIRUS 2 (SARS-COV-2) (CORONAVIRUS DISEASE [COVID-19]), AMPLIFIED PROBE TECHNIQUE, MAKING USE OF HIGH THROUGHPUT TECHNOLOGIES AS DESCRIBED BY CMS-2020-01-R: HCPCS

## 2020-06-18 PROCEDURE — 99213 OFFICE O/P EST LOW 20 MIN: CPT | Mod: 95,,, | Performed by: PHYSICIAN ASSISTANT

## 2020-06-18 NOTE — PROGRESS NOTES
Subjective:       Patient ID: Alma Vivar is a 54 y.o. female.    Chief Complaint:   Fever   This is a new problem. The current episode started yesterday. The problem occurs intermittently. The problem has been gradually improving. The maximum temperature noted was 100 to 100.9 F. The temperature was taken using an oral thermometer. Associated symptoms include congestion, coughing, ear pain, headaches and a sore throat. Pertinent negatives include no abdominal pain, chest pain, muscle aches, nausea, rash, sleepiness, urinary pain, vomiting or wheezing. She has tried nothing for the symptoms.   Risk factors: no contaminated food, no contaminated water, no hx of cancer, no immunosuppression, no occupational exposure, no recent sickness, no recent travel and no sick contacts      The patient location is:home  The chief complaint leading to consultation is: Patient calls in today for fever, started yest   Max temp 100.6  Also has some congestion, no cough, no shortness of breath     She is concerned about COVID  Working from home     No urine issues, no abd pain   No n/v   No diarrhea     Visit type: audiovisual    Face to Face time with patient: 10  10 minutes of total time spent on the encounter, which includes face to face time and non-face to face time preparing to see the patient (eg, review of tests), Obtaining and/or reviewing separately obtained history, Documenting clinical information in the electronic or other health record, Independently interpreting results (not separately reported) and communicating results to the patient/family/caregiver, or Care coordination (not separately reported).         Each patient to whom he or she provides medical services by telemedicine is:  (1) informed of the relationship between the physician and patient and the respective role of any other health care provider with respect to management of the patient; and (2) notified that he or she may decline to receive  medical services by telemedicine and may withdraw from such care at any time.    Notes:     Health Maintenance Due   Topic Date Due    HIV Screening  12/21/1981    TETANUS VACCINE  12/21/1984    Shingles Vaccine (1 of 2) 12/21/2016    Colorectal Cancer Screening  05/23/2020       Past Medical History:   Diagnosis Date    Hormone replacement therapy (HRT)     Hyperlipidemia        Current Outpatient Medications   Medication Sig Dispense Refill    azithromycin (ZITHROMAX Z-RUPERTO) 250 MG tablet 2 tablets today then one tablet daily for 4 days 6 tablet 0    estradiol (ESTRACE) 1 MG tablet Take 1 mg by mouth once daily.      fexofenadine (ALLEGRA) 180 MG tablet Take 180 mg by mouth once daily.      fluticasone (VERAMYST) 27.5 mcg/actuation nasal spray 2 sprays by Nasal route once daily. 15.8 mL 3     No current facility-administered medications for this visit.        Review of Systems   Constitutional: Positive for fever.   HENT: Positive for congestion, ear pain and sore throat.    Respiratory: Positive for cough. Negative for wheezing.    Cardiovascular: Negative for chest pain.   Gastrointestinal: Negative for abdominal pain, nausea and vomiting.   Genitourinary: Negative for dysuria.   Skin: Negative for rash.   Neurological: Positive for headaches.       Objective:   There were no vitals taken for this visit.     Physical Exam  Constitutional:       Appearance: Normal appearance.   HENT:      Head: Normocephalic and atraumatic.   Neck:      Musculoskeletal: Normal range of motion.   Pulmonary:      Effort: Pulmonary effort is normal.   Neurological:      General: No focal deficit present.   Psychiatric:         Mood and Affect: Mood normal.         Behavior: Behavior normal.         Thought Content: Thought content normal.           Lab Results   Component Value Date    HDL 51 08/07/2015    ALT 30 11/07/2018    AST 20 11/07/2018     11/07/2018    K 3.6 11/07/2018     11/07/2018    CREATININE 0.9  11/07/2018    BUN 12 11/07/2018    CO2 27 11/07/2018       Assessment:       1. Cough    2. Head ache    3. Fever        Plan:   Cough  -     Cancel: COVID-19 Routine Screening  -     COVID-19 Routine Screening; Future; Expected date: 06/18/2020    Head ache  -     Cancel: COVID-19 Routine Screening  -     COVID-19 Routine Screening; Future; Expected date: 06/18/2020    Fever  -     Cancel: COVID-19 Routine Screening  -     COVID-19 Routine Screening; Future; Expected date: 06/18/2020      Mild symptoms   Stay home until results   Self isolate    severe symptoms, ER     No follow-ups on file.

## 2020-06-19 LAB — SARS-COV-2 RNA RESP QL NAA+PROBE: NOT DETECTED

## 2020-08-25 ENCOUNTER — PATIENT OUTREACH (OUTPATIENT)
Dept: ADMINISTRATIVE | Facility: HOSPITAL | Age: 55
End: 2020-08-25

## 2020-08-25 NOTE — LETTER
AUTHORIZATION FOR RELEASE OF   CONFIDENTIAL INFORMATION    Dear Dr. Laureano,    We are seeing Alma Vivar, date of birth 1965, in the clinic at [facility]. Candis Wilkins MD is the patient's PCP. Alma Vivar has an outstanding lab/procedure at the time we reviewed her chart. In order to help keep her health information updated, she has authorized us to request the following medical record(s):                                            (x )  MAMMOGRAM -Most Recent                              (  )  PAP SMEAR                                          (  )  OUTSIDE LAB RESULTS     (  )  DEXA SCAN                                          (  )  EYE EXAM            (  )  FOOT EXAM                                          (  )  ENTIRE RECORD     (  )  OUTSIDE IMMUNIZATIONS                 (  )  _______________         Please fax records to [facility], Melissa B Love, MD, Attn: Araceli Hardwick LPN                                                                  Fax: [phone]     If you have any questions, please contact Araceli Hardwick LPN                                                                                Phone: [phone]          Patient Name: Alma Vivar  : 1965  Patient Phone #: [phone]

## 2020-08-25 NOTE — PROGRESS NOTES
Working mammo report, pt due. Called to offer scheduling. No answer, left message. Last Mammo 2018 with dr. Hailee Laureano at [facility] sent.

## 2020-11-30 ENCOUNTER — OFFICE VISIT (OUTPATIENT)
Dept: PRIMARY CARE CLINIC | Facility: CLINIC | Age: 55
End: 2020-11-30
Payer: COMMERCIAL

## 2020-11-30 DIAGNOSIS — J01.90 ACUTE SINUSITIS, RECURRENCE NOT SPECIFIED, UNSPECIFIED LOCATION: ICD-10-CM

## 2020-11-30 DIAGNOSIS — U07.1 COVID-19: Primary | ICD-10-CM

## 2020-11-30 PROCEDURE — 99214 OFFICE O/P EST MOD 30 MIN: CPT | Mod: 95,,, | Performed by: PHYSICIAN ASSISTANT

## 2020-11-30 PROCEDURE — 99214 PR OFFICE/OUTPT VISIT, EST, LEVL IV, 30-39 MIN: ICD-10-PCS | Mod: 95,,, | Performed by: PHYSICIAN ASSISTANT

## 2020-11-30 RX ORDER — PREDNISONE 20 MG/1
20 TABLET ORAL DAILY
Qty: 5 TABLET | Refills: 0 | Status: SHIPPED | OUTPATIENT
Start: 2020-11-30 | End: 2020-12-05

## 2020-11-30 NOTE — PROGRESS NOTES
"Subjective:      Patient ID: Alma Vivar is a 54 y.o. female.    Chief Complaint: COVID-19 Concerns    The patient location is: home   The chief complaint leading to consultation is: COVID-19, sinus congestion     Visit type: audiovisual    Face to Face time with patient: 14 minutes   17 minutes of total time spent on the encounter, which includes face to face time and non-face to face time preparing to see the patient (eg, review of tests), Obtaining and/or reviewing separately obtained history, Documenting clinical information in the electronic or other health record, Independently interpreting results (not separately reported) and communicating results to the patient/family/caregiver, or Care coordination (not separately reported).         Each patient to whom he or she provides medical services by telemedicine is:  (1) informed of the relationship between the physician and patient and the respective role of any other health care provider with respect to management of the patient; and (2) notified that he or she may decline to receive medical services by telemedicine and may withdraw from such care at any time.    Notes:   Alma Vivar is a 54 y.o.female who presents to video visit for COVID-19 follow-up.  Per patient, she tested positive for COVID-19 on 11/25.  Since, she has been doing ok, feel like I have a "head cold."  Admits lots of  drainage.  Every so often, a cough.  Symptoms first began last Saturday or Sunday.  No fever, chills, loss of taste or smell.  Eating normally.  Get sinus infections all the time.  Taking mucinex twice daily.  Wants to know how she can prevent from developing into a sinus infection.      Review of Systems   Constitutional: Negative for activity change and unexpected weight change.   HENT: Positive for rhinorrhea. Negative for hearing loss and trouble swallowing.    Eyes: Negative for discharge and visual disturbance.   Respiratory: Negative for chest " tightness and wheezing.    Cardiovascular: Negative for chest pain and palpitations.   Gastrointestinal: Negative for blood in stool, constipation, diarrhea and vomiting.   Endocrine: Negative for polydipsia and polyuria.   Genitourinary: Negative for difficulty urinating, dysuria, hematuria and menstrual problem.   Musculoskeletal: Negative for arthralgias, joint swelling and neck pain.   Neurological: Negative for weakness and headaches.   Psychiatric/Behavioral: Negative for confusion and dysphoric mood.       Objective:   There were no vitals taken for this visit.  Physical Exam  Constitutional:       General: She is not in acute distress.     Appearance: She is well-developed. She is not diaphoretic.   HENT:      Head: Normocephalic and atraumatic.      Right Ear: External ear normal.      Left Ear: External ear normal.      Nose: Nose normal. No rhinorrhea.   Pulmonary:      Effort: Pulmonary effort is normal. No respiratory distress.   Musculoskeletal: Normal range of motion.   Skin:     Capillary Refill: Capillary refill takes less than 2 seconds.   Neurological:      Mental Status: She is alert and oriented to person, place, and time.      Motor: No abnormal muscle tone.   Psychiatric:         Mood and Affect: Mood normal.         Behavior: Behavior normal.         Thought Content: Thought content normal.       Assessment:      1. COVID-19    2. Acute sinusitis, recurrence not specified, unspecified location       Plan:   COVID-19  Comments:  discussed CDC guidelines, inc. fluids, cont. mucinex, flonase, deep breathing exercises     Acute sinusitis, recurrence not specified, unspecified location  Comments:  disc. steroid to reduce nasal passageway inflammation if no improvement in sinus symptoms w/ flonase, fluids, antihistamine in next few days   Orders:  -     predniSONE (DELTASONE) 20 MG tablet; Take 1 tablet (20 mg total) by mouth once daily. for 5 days  Dispense: 5 tablet; Refill: 0          Candis  PETE Romo   Physician Assistant   [facility]

## 2021-01-20 DIAGNOSIS — Z12.31 OTHER SCREENING MAMMOGRAM: ICD-10-CM

## 2021-04-29 ENCOUNTER — PATIENT OUTREACH (OUTPATIENT)
Dept: ADMINISTRATIVE | Facility: HOSPITAL | Age: 56
End: 2021-04-29

## 2021-04-29 ENCOUNTER — PATIENT MESSAGE (OUTPATIENT)
Dept: RESEARCH | Facility: HOSPITAL | Age: 56
End: 2021-04-29

## 2022-04-27 ENCOUNTER — PATIENT MESSAGE (OUTPATIENT)
Dept: ADMINISTRATIVE | Facility: HOSPITAL | Age: 57
End: 2022-04-27
Payer: COMMERCIAL

## 2022-07-18 ENCOUNTER — OFFICE VISIT (OUTPATIENT)
Dept: PRIMARY CARE CLINIC | Facility: CLINIC | Age: 57
End: 2022-07-18
Payer: COMMERCIAL

## 2022-07-18 ENCOUNTER — CLINICAL SUPPORT (OUTPATIENT)
Dept: PRIMARY CARE CLINIC | Facility: CLINIC | Age: 57
End: 2022-07-18
Payer: COMMERCIAL

## 2022-07-18 ENCOUNTER — TELEPHONE (OUTPATIENT)
Dept: PRIMARY CARE CLINIC | Facility: CLINIC | Age: 57
End: 2022-07-18
Payer: COMMERCIAL

## 2022-07-18 VITALS
DIASTOLIC BLOOD PRESSURE: 76 MMHG | OXYGEN SATURATION: 97 % | HEIGHT: 61 IN | HEART RATE: 75 BPM | WEIGHT: 139.69 LBS | BODY MASS INDEX: 26.37 KG/M2 | SYSTOLIC BLOOD PRESSURE: 120 MMHG | TEMPERATURE: 98 F

## 2022-07-18 DIAGNOSIS — R07.9 CHEST PAIN, UNSPECIFIED TYPE: ICD-10-CM

## 2022-07-18 DIAGNOSIS — Z91.013 ALLERGY TO SHELLFISH: ICD-10-CM

## 2022-07-18 DIAGNOSIS — R06.83 SNORING: ICD-10-CM

## 2022-07-18 DIAGNOSIS — R07.9 CHEST PAIN, UNSPECIFIED TYPE: Primary | ICD-10-CM

## 2022-07-18 DIAGNOSIS — Z00.00 WELLNESS EXAMINATION: ICD-10-CM

## 2022-07-18 PROCEDURE — 93010 ELECTROCARDIOGRAM REPORT: CPT | Mod: S$GLB,,, | Performed by: INTERNAL MEDICINE

## 2022-07-18 PROCEDURE — 93010 EKG 12-LEAD: ICD-10-PCS | Mod: S$GLB,,, | Performed by: INTERNAL MEDICINE

## 2022-07-18 PROCEDURE — 3078F PR MOST RECENT DIASTOLIC BLOOD PRESSURE < 80 MM HG: ICD-10-PCS | Mod: CPTII,S$GLB,, | Performed by: PHYSICIAN ASSISTANT

## 2022-07-18 PROCEDURE — 93005 ELECTROCARDIOGRAM TRACING: CPT | Mod: S$GLB,,, | Performed by: PHYSICIAN ASSISTANT

## 2022-07-18 PROCEDURE — 3074F PR MOST RECENT SYSTOLIC BLOOD PRESSURE < 130 MM HG: ICD-10-PCS | Mod: CPTII,S$GLB,, | Performed by: PHYSICIAN ASSISTANT

## 2022-07-18 PROCEDURE — 99214 PR OFFICE/OUTPT VISIT, EST, LEVL IV, 30-39 MIN: ICD-10-PCS | Mod: S$GLB,,, | Performed by: PHYSICIAN ASSISTANT

## 2022-07-18 PROCEDURE — 3044F HG A1C LEVEL LT 7.0%: CPT | Mod: CPTII,S$GLB,, | Performed by: PHYSICIAN ASSISTANT

## 2022-07-18 PROCEDURE — 3074F SYST BP LT 130 MM HG: CPT | Mod: CPTII,S$GLB,, | Performed by: PHYSICIAN ASSISTANT

## 2022-07-18 PROCEDURE — 99214 OFFICE O/P EST MOD 30 MIN: CPT | Mod: S$GLB,,, | Performed by: PHYSICIAN ASSISTANT

## 2022-07-18 PROCEDURE — 1159F MED LIST DOCD IN RCRD: CPT | Mod: CPTII,S$GLB,, | Performed by: PHYSICIAN ASSISTANT

## 2022-07-18 PROCEDURE — 3078F DIAST BP <80 MM HG: CPT | Mod: CPTII,S$GLB,, | Performed by: PHYSICIAN ASSISTANT

## 2022-07-18 PROCEDURE — 3044F PR MOST RECENT HEMOGLOBIN A1C LEVEL <7.0%: ICD-10-PCS | Mod: CPTII,S$GLB,, | Performed by: PHYSICIAN ASSISTANT

## 2022-07-18 PROCEDURE — 3008F BODY MASS INDEX DOCD: CPT | Mod: CPTII,S$GLB,, | Performed by: PHYSICIAN ASSISTANT

## 2022-07-18 PROCEDURE — 1160F PR REVIEW ALL MEDS BY PRESCRIBER/CLIN PHARMACIST DOCUMENTED: ICD-10-PCS | Mod: CPTII,S$GLB,, | Performed by: PHYSICIAN ASSISTANT

## 2022-07-18 PROCEDURE — 1159F PR MEDICATION LIST DOCUMENTED IN MEDICAL RECORD: ICD-10-PCS | Mod: CPTII,S$GLB,, | Performed by: PHYSICIAN ASSISTANT

## 2022-07-18 PROCEDURE — 3008F PR BODY MASS INDEX (BMI) DOCUMENTED: ICD-10-PCS | Mod: CPTII,S$GLB,, | Performed by: PHYSICIAN ASSISTANT

## 2022-07-18 PROCEDURE — 99999 PR PBB SHADOW E&M-EST. PATIENT-LVL I: CPT | Mod: PBBFAC,,,

## 2022-07-18 PROCEDURE — 93005 EKG 12-LEAD: ICD-10-PCS | Mod: S$GLB,,, | Performed by: PHYSICIAN ASSISTANT

## 2022-07-18 PROCEDURE — 1160F RVW MEDS BY RX/DR IN RCRD: CPT | Mod: CPTII,S$GLB,, | Performed by: PHYSICIAN ASSISTANT

## 2022-07-18 PROCEDURE — 99999 PR PBB SHADOW E&M-EST. PATIENT-LVL V: ICD-10-PCS | Mod: PBBFAC,,, | Performed by: PHYSICIAN ASSISTANT

## 2022-07-18 PROCEDURE — 99999 PR PBB SHADOW E&M-EST. PATIENT-LVL V: CPT | Mod: PBBFAC,,, | Performed by: PHYSICIAN ASSISTANT

## 2022-07-18 PROCEDURE — 99999 PR PBB SHADOW E&M-EST. PATIENT-LVL I: ICD-10-PCS | Mod: PBBFAC,,,

## 2022-07-18 RX ORDER — ASPIRIN 81 MG/1
81 TABLET ORAL DAILY
COMMUNITY
End: 2022-12-06

## 2022-07-18 RX ORDER — PREDNISONE 20 MG/1
20 TABLET ORAL
COMMUNITY
Start: 2022-07-09 | End: 2022-07-19

## 2022-07-18 NOTE — PROGRESS NOTES
Subjective:      Patient ID: Alma Vivar is a 56 y.o. female.    Chief Complaint: Hospital Follow Up and Follow-up (Stress test consult per hosp physician)    Alma Vivar is a 56 y.o. female who presents to clinic for follow-up after ED visit 7/3    Was on way to airport in [place] when felt chest tightness and numbness/tingling in arm as well as lighthead and dizzy  Had been working hard in yard, could work for about 45 minutes and had to sit down/got tired   Feel like can't take normal deep breath   Had just started prednisone for poison ivy - on last day   Sleep deprived - mild snoring but gotten worse in last year -  says that there are times that snort and wake up      EPWORTH SLEEPINESS SCALE    Sitting and reading ____  0  Watching TV ____  0  Sitting inactive in a public place ____  0  Being a passenger in a motor vehicle for an hour or more ____  4  Lying down in the afternoon  ____  4  Sitting and talking to someone  ____  0  Sitting quietly after lunch (no alcohol) ____  2  Stopped for a few minutes in traffic while driving  ____  0    Total score   ____  9    Interpretation:  0-7: Normal  sleepiness  8-9: Average amount of daytime sleepiness.  10-15:Excessively sleepiness depending on the situation. Medical attention suggested.  16-24: Excessive sleepiness and medical attention recommended.    Reference: Ryan HUNTLEY. A new method for measuring daytime sleepiness: The Oxford  Sleepiness Scale. Sleep 1991; 14(6):540-5.    The ASCVD Risk score (Wildwood RAEANN Jr., et al., 2013) failed to calculate for the following reasons:    Cannot find a previous HDL lab    Cannot find a previous total cholesterol lab        Past Medical History:  8/20/2018 10:09:15 AM: Abnormal glandular Papanicolaou smear of cervix      Comment:  [facility] - [facility]: Abnormal PAP Smear-No                Additional Notes  8/20/2018 10:09:15 AM: Abnormal glandular Papanicolaou smear of cervix      Comment:  [facility]  "[facility] - [facility]: Abnormal PAP Smear-No                Additional Notes  No date: Hormone replacement therapy (HRT)  10/6/2020 8:36:36 AM: Human papillomavirus in conditions classified   elsewhere and of unspecified site      Comment:  [facility] - [facility] Add: HPV in female-No                Additional Notes  10/6/2020 8:36:36 AM: Human papillomavirus in conditions classified   elsewhere and of unspecified site      Comment:  [facility] - [facility] Add: HPV in female-No                Additional Notes  No date: Hyperlipidemia  2018 9:52:03 AM: Other specified noninflammatory disorder of   vagina      Comment:  [facility] - [facility] Add: Vaginal lesion-No                Additional Notes  2018 9:52:03 AM: Other specified noninflammatory disorder of   vagina      Comment:  [facility] Quick Add: Vaginal Lesion-No                Additional Notes    Past Surgical History:  No date:  SECTION  No date: HYSTERECTOMY        Follow-up  Associated symptoms include chest pain and fatigue. Pertinent negatives include no abdominal pain, chills, coughing, diaphoresis, fever, nausea, rash or vomiting.     Review of Systems   Constitutional: Positive for fatigue. Negative for chills, diaphoresis and fever.   Respiratory: Negative for cough, shortness of breath and wheezing.    Cardiovascular: Positive for chest pain.   Gastrointestinal: Negative for abdominal pain, nausea and vomiting.   Skin: Negative for rash.   Neurological: Positive for dizziness.   Psychiatric/Behavioral: Positive for sleep disturbance.       Objective:   /76 (BP Location: Left arm, Patient Position: Sitting)   Pulse 75   Temp 97.7 °F (36.5 °C) (Temporal)   Ht 5' 1" (1.549 m)   Wt 63.3 kg (139 lb 10.6 oz)   SpO2 97%   BMI 26.39 kg/m²   Physical Exam  Vitals reviewed.   Constitutional:       General: She is not in acute distress.     Appearance: She is well-developed. She is not " ill-appearing, toxic-appearing or diaphoretic.   HENT:      Head: Normocephalic and atraumatic.      Right Ear: External ear normal.      Left Ear: External ear normal.      Nose: Nose normal.   Cardiovascular:      Rate and Rhythm: Normal rate and regular rhythm.      Pulses:           Radial pulses are 2+ on the right side and 2+ on the left side.      Heart sounds: Normal heart sounds, S1 normal and S2 normal.   Pulmonary:      Effort: Pulmonary effort is normal. No tachypnea, bradypnea, accessory muscle usage or respiratory distress.      Breath sounds: Normal breath sounds. No decreased breath sounds, wheezing, rhonchi or rales.   Chest:      Chest wall: No tenderness.   Skin:     General: Skin is warm and dry.      Capillary Refill: Capillary refill takes less than 2 seconds.   Neurological:      Mental Status: She is alert and oriented to person, place, and time. She is not disoriented.      Cranial Nerves: No cranial nerve deficit.      Sensory: No sensory deficit.      Motor: No abnormal muscle tone.   Psychiatric:         Behavior: Behavior normal.       Assessment:      1. Chest pain, unspecified type    2. Allergy to shellfish    3. Snoring    4. Wellness examination       Plan:   Chest pain, unspecified type  Comments:  EKG showed NSR - desiring stress test - follow-up with Cardiology for further eval   Orders:  -     EKG 12-lead; Future  -     Ambulatory referral/consult to Cardiology; Future; Expected date: 07/25/2022    Allergy to shellfish  Comments:  also with reaction, possibly to agave - desiring further testing   Orders:  -     Ambulatory referral/consult to Allergy; Future; Expected date: 07/25/2022    Snoring  Comments:  epworth score 9 - home sleep studies   Orders:  -     Home Sleep Studies; Future    Wellness examination  Comments:  code for labs   Orders:  -     TSH; Future; Expected date: 07/18/2022  -     T4, Free; Future; Expected date: 07/18/2022  -     Lipid Panel; Future; Expected  date: 07/18/2022  -     Hemoglobin A1C; Future; Expected date: 07/18/2022  -     Comprehensive Metabolic Panel; Future; Expected date: 07/18/2022  -     CBC Auto Differential; Future; Expected date: 07/18/2022  -     Vitamin D; Future; Expected date: 07/18/2022  -     Hepatitis C Antibody; Future; Expected date: 07/18/2022  -     HIV 1/2 Ag/Ab (4th Gen); Future; Expected date: 07/18/2022          Candis Romo PA-C   Physician Assistant   [facility]

## 2022-07-18 NOTE — TELEPHONE ENCOUNTER
----- Message from Sidney Ken sent at 7/18/2022 10:39 AM CDT -----  Contact: Alma Hi stated that she is experiencing symptoms of tightness of chest, arm,an artery that goes up neck w/ slight dizziness. Pt is afraid to wait until 3:20 appt and would like to come in sooner. Please call her back at [phone].          Thanks  DD

## 2022-07-18 NOTE — TELEPHONE ENCOUNTER
I could see about her coming at 2? But that is earliest you have at this time. With her symptoms though should she go to ED?

## 2022-07-19 ENCOUNTER — LAB VISIT (OUTPATIENT)
Dept: LAB | Facility: HOSPITAL | Age: 57
End: 2022-07-19
Attending: PHYSICIAN ASSISTANT
Payer: COMMERCIAL

## 2022-07-19 ENCOUNTER — TELEPHONE (OUTPATIENT)
Dept: SLEEP MEDICINE | Facility: CLINIC | Age: 57
End: 2022-07-19
Payer: COMMERCIAL

## 2022-07-19 DIAGNOSIS — Z00.00 WELLNESS EXAMINATION: ICD-10-CM

## 2022-07-19 LAB
25(OH)D3+25(OH)D2 SERPL-MCNC: 34 NG/ML (ref 30–96)
ALBUMIN SERPL BCP-MCNC: 4 G/DL (ref 3.5–5.2)
ALP SERPL-CCNC: 126 U/L (ref 55–135)
ALT SERPL W/O P-5'-P-CCNC: 17 U/L (ref 10–44)
ANION GAP SERPL CALC-SCNC: 12 MMOL/L (ref 8–16)
AST SERPL-CCNC: 11 U/L (ref 10–40)
BASOPHILS # BLD AUTO: 0.03 K/UL (ref 0–0.2)
BASOPHILS NFR BLD: 0.2 % (ref 0–1.9)
BILIRUB SERPL-MCNC: 0.7 MG/DL (ref 0.1–1)
BUN SERPL-MCNC: 19 MG/DL (ref 6–20)
CALCIUM SERPL-MCNC: 9.5 MG/DL (ref 8.7–10.5)
CHLORIDE SERPL-SCNC: 103 MMOL/L (ref 95–110)
CHOLEST SERPL-MCNC: 255 MG/DL (ref 120–199)
CHOLEST/HDLC SERPL: 3.5 {RATIO} (ref 2–5)
CO2 SERPL-SCNC: 25 MMOL/L (ref 23–29)
CREAT SERPL-MCNC: 0.8 MG/DL (ref 0.5–1.4)
DIFFERENTIAL METHOD: ABNORMAL
EOSINOPHIL # BLD AUTO: 0.1 K/UL (ref 0–0.5)
EOSINOPHIL NFR BLD: 0.5 % (ref 0–8)
ERYTHROCYTE [DISTWIDTH] IN BLOOD BY AUTOMATED COUNT: 12.9 % (ref 11.5–14.5)
EST. GFR  (AFRICAN AMERICAN): >60 ML/MIN/1.73 M^2
EST. GFR  (NON AFRICAN AMERICAN): >60 ML/MIN/1.73 M^2
ESTIMATED AVG GLUCOSE: 103 MG/DL (ref 68–131)
GLUCOSE SERPL-MCNC: 76 MG/DL (ref 70–110)
HBA1C MFR BLD: 5.2 % (ref 4–5.6)
HCT VFR BLD AUTO: 45.3 % (ref 37–48.5)
HDLC SERPL-MCNC: 73 MG/DL (ref 40–75)
HDLC SERPL: 28.6 % (ref 20–50)
HGB BLD-MCNC: 14.2 G/DL (ref 12–16)
IMM GRANULOCYTES # BLD AUTO: 0.07 K/UL (ref 0–0.04)
IMM GRANULOCYTES NFR BLD AUTO: 0.5 % (ref 0–0.5)
LDLC SERPL CALC-MCNC: 166.8 MG/DL (ref 63–159)
LYMPHOCYTES # BLD AUTO: 3.3 K/UL (ref 1–4.8)
LYMPHOCYTES NFR BLD: 25.6 % (ref 18–48)
MCH RBC QN AUTO: 30.5 PG (ref 27–31)
MCHC RBC AUTO-ENTMCNC: 31.3 G/DL (ref 32–36)
MCV RBC AUTO: 97 FL (ref 82–98)
MONOCYTES # BLD AUTO: 0.9 K/UL (ref 0.3–1)
MONOCYTES NFR BLD: 6.9 % (ref 4–15)
NEUTROPHILS # BLD AUTO: 8.6 K/UL (ref 1.8–7.7)
NEUTROPHILS NFR BLD: 66.3 % (ref 38–73)
NONHDLC SERPL-MCNC: 182 MG/DL
NRBC BLD-RTO: 0 /100 WBC
PLATELET # BLD AUTO: 324 K/UL (ref 150–450)
PMV BLD AUTO: 10.9 FL (ref 9.2–12.9)
POTASSIUM SERPL-SCNC: 4.2 MMOL/L (ref 3.5–5.1)
PROT SERPL-MCNC: 7.7 G/DL (ref 6–8.4)
RBC # BLD AUTO: 4.65 M/UL (ref 4–5.4)
SODIUM SERPL-SCNC: 140 MMOL/L (ref 136–145)
T4 FREE SERPL-MCNC: 0.96 NG/DL (ref 0.71–1.51)
TRIGL SERPL-MCNC: 76 MG/DL (ref 30–150)
TSH SERPL DL<=0.005 MIU/L-ACNC: 0.29 UIU/ML (ref 0.4–4)
WBC # BLD AUTO: 13.04 K/UL (ref 3.9–12.7)

## 2022-07-19 PROCEDURE — 85025 COMPLETE CBC W/AUTO DIFF WBC: CPT | Performed by: PHYSICIAN ASSISTANT

## 2022-07-19 PROCEDURE — 86803 HEPATITIS C AB TEST: CPT | Performed by: PHYSICIAN ASSISTANT

## 2022-07-19 PROCEDURE — 82306 VITAMIN D 25 HYDROXY: CPT | Performed by: PHYSICIAN ASSISTANT

## 2022-07-19 PROCEDURE — 80061 LIPID PANEL: CPT | Performed by: PHYSICIAN ASSISTANT

## 2022-07-19 PROCEDURE — 83036 HEMOGLOBIN GLYCOSYLATED A1C: CPT | Performed by: PHYSICIAN ASSISTANT

## 2022-07-19 PROCEDURE — 84443 ASSAY THYROID STIM HORMONE: CPT | Performed by: PHYSICIAN ASSISTANT

## 2022-07-19 PROCEDURE — 36415 COLL VENOUS BLD VENIPUNCTURE: CPT | Mod: PO | Performed by: PHYSICIAN ASSISTANT

## 2022-07-19 PROCEDURE — 80053 COMPREHEN METABOLIC PANEL: CPT | Performed by: PHYSICIAN ASSISTANT

## 2022-07-19 PROCEDURE — 87389 HIV-1 AG W/HIV-1&-2 AB AG IA: CPT | Performed by: PHYSICIAN ASSISTANT

## 2022-07-19 PROCEDURE — 84439 ASSAY OF FREE THYROXINE: CPT | Performed by: PHYSICIAN ASSISTANT

## 2022-07-20 ENCOUNTER — TELEPHONE (OUTPATIENT)
Dept: PRIMARY CARE CLINIC | Facility: CLINIC | Age: 57
End: 2022-07-20
Payer: COMMERCIAL

## 2022-07-20 ENCOUNTER — HOSPITAL ENCOUNTER (EMERGENCY)
Facility: HOSPITAL | Age: 57
Discharge: HOME | End: 2022-07-20
Attending: EMERGENCY MEDICINE
Payer: COMMERCIAL

## 2022-07-20 VITALS
RESPIRATION RATE: 20 BRPM | BODY MASS INDEX: 27.43 KG/M2 | WEIGHT: 145.31 LBS | SYSTOLIC BLOOD PRESSURE: 139 MMHG | OXYGEN SATURATION: 99 % | HEIGHT: 61 IN | TEMPERATURE: 98 F | HEART RATE: 85 BPM | DIASTOLIC BLOOD PRESSURE: 75 MMHG

## 2022-07-20 DIAGNOSIS — R06.02 SOB (SHORTNESS OF BREATH): ICD-10-CM

## 2022-07-20 DIAGNOSIS — R07.9 CHEST PAIN: Primary | ICD-10-CM

## 2022-07-20 LAB
ALBUMIN SERPL BCP-MCNC: 3.9 G/DL (ref 3.5–5.2)
ALP SERPL-CCNC: 122 U/L (ref 55–135)
ALT SERPL W/O P-5'-P-CCNC: 16 U/L (ref 10–44)
ANION GAP SERPL CALC-SCNC: 13 MMOL/L (ref 8–16)
AST SERPL-CCNC: 23 U/L (ref 10–40)
BASOPHILS # BLD AUTO: 0.04 K/UL (ref 0–0.2)
BASOPHILS NFR BLD: 0.4 % (ref 0–1.9)
BILIRUB SERPL-MCNC: 0.4 MG/DL (ref 0.1–1)
BILIRUB UR QL STRIP: NEGATIVE
BNP SERPL-MCNC: <10 PG/ML (ref 0–99)
BUN SERPL-MCNC: 17 MG/DL (ref 6–20)
CALCIUM SERPL-MCNC: 9.2 MG/DL (ref 8.7–10.5)
CHLORIDE SERPL-SCNC: 103 MMOL/L (ref 95–110)
CK SERPL-CCNC: 57 U/L (ref 20–180)
CLARITY UR: CLEAR
CO2 SERPL-SCNC: 23 MMOL/L (ref 23–29)
COLOR UR: COLORLESS
CREAT SERPL-MCNC: 0.9 MG/DL (ref 0.5–1.4)
CTP QC/QA: YES
CTP QC/QA: YES
DIFFERENTIAL METHOD: ABNORMAL
EOSINOPHIL # BLD AUTO: 0.1 K/UL (ref 0–0.5)
EOSINOPHIL NFR BLD: 1.1 % (ref 0–8)
ERYTHROCYTE [DISTWIDTH] IN BLOOD BY AUTOMATED COUNT: 12.6 % (ref 11.5–14.5)
EST. GFR  (AFRICAN AMERICAN): >60 ML/MIN/1.73 M^2
EST. GFR  (NON AFRICAN AMERICAN): >60 ML/MIN/1.73 M^2
GLUCOSE SERPL-MCNC: 86 MG/DL (ref 70–110)
GLUCOSE UR QL STRIP: NEGATIVE
HCT VFR BLD AUTO: 43.2 % (ref 37–48.5)
HCV AB SERPL QL IA: NEGATIVE
HGB BLD-MCNC: 14.3 G/DL (ref 12–16)
HGB UR QL STRIP: NEGATIVE
HIV 1+2 AB+HIV1 P24 AG SERPL QL IA: NEGATIVE
IMM GRANULOCYTES # BLD AUTO: 0.07 K/UL (ref 0–0.04)
IMM GRANULOCYTES NFR BLD AUTO: 0.7 % (ref 0–0.5)
KETONES UR QL STRIP: NEGATIVE
LEUKOCYTE ESTERASE UR QL STRIP: NEGATIVE
LYMPHOCYTES # BLD AUTO: 3.9 K/UL (ref 1–4.8)
LYMPHOCYTES NFR BLD: 37.2 % (ref 18–48)
MCH RBC QN AUTO: 29.9 PG (ref 27–31)
MCHC RBC AUTO-ENTMCNC: 33.1 G/DL (ref 32–36)
MCV RBC AUTO: 90 FL (ref 82–98)
MONOCYTES # BLD AUTO: 0.9 K/UL (ref 0.3–1)
MONOCYTES NFR BLD: 8.7 % (ref 4–15)
NEUTROPHILS # BLD AUTO: 5.4 K/UL (ref 1.8–7.7)
NEUTROPHILS NFR BLD: 51.9 % (ref 38–73)
NITRITE UR QL STRIP: NEGATIVE
NRBC BLD-RTO: 0 /100 WBC
PH UR STRIP: 7 [PH] (ref 5–8)
PLATELET # BLD AUTO: 308 K/UL (ref 150–450)
PMV BLD AUTO: 10.1 FL (ref 9.2–12.9)
POC MOLECULAR INFLUENZA A AGN: NEGATIVE
POC MOLECULAR INFLUENZA B AGN: NEGATIVE
POTASSIUM SERPL-SCNC: 5 MMOL/L (ref 3.5–5.1)
PROT SERPL-MCNC: 8.4 G/DL (ref 6–8.4)
PROT UR QL STRIP: NEGATIVE
RBC # BLD AUTO: 4.78 M/UL (ref 4–5.4)
SARS-COV-2 RDRP RESP QL NAA+PROBE: NEGATIVE
SODIUM SERPL-SCNC: 139 MMOL/L (ref 136–145)
SP GR UR STRIP: <1.005 (ref 1–1.03)
TROPONIN I SERPL DL<=0.01 NG/ML-MCNC: <0.006 NG/ML (ref 0–0.03)
URN SPEC COLLECT METH UR: ABNORMAL
UROBILINOGEN UR STRIP-ACNC: NEGATIVE EU/DL
WBC # BLD AUTO: 10.4 K/UL (ref 3.9–12.7)

## 2022-07-20 PROCEDURE — 93005 ELECTROCARDIOGRAM TRACING: CPT

## 2022-07-20 PROCEDURE — 85025 COMPLETE CBC W/AUTO DIFF WBC: CPT | Performed by: EMERGENCY MEDICINE

## 2022-07-20 PROCEDURE — 81003 URINALYSIS AUTO W/O SCOPE: CPT | Performed by: EMERGENCY MEDICINE

## 2022-07-20 PROCEDURE — 99285 EMERGENCY DEPT VISIT HI MDM: CPT | Mod: 25

## 2022-07-20 PROCEDURE — 82550 ASSAY OF CK (CPK): CPT | Performed by: EMERGENCY MEDICINE

## 2022-07-20 PROCEDURE — 87502 INFLUENZA DNA AMP PROBE: CPT

## 2022-07-20 PROCEDURE — 25500020 PHARM REV CODE 255: Performed by: EMERGENCY MEDICINE

## 2022-07-20 PROCEDURE — 83880 ASSAY OF NATRIURETIC PEPTIDE: CPT | Performed by: EMERGENCY MEDICINE

## 2022-07-20 PROCEDURE — 93010 ELECTROCARDIOGRAM REPORT: CPT | Mod: ,,, | Performed by: INTERNAL MEDICINE

## 2022-07-20 PROCEDURE — 93010 EKG 12-LEAD: ICD-10-PCS | Mod: ,,, | Performed by: INTERNAL MEDICINE

## 2022-07-20 PROCEDURE — U0002 COVID-19 LAB TEST NON-CDC: HCPCS | Performed by: EMERGENCY MEDICINE

## 2022-07-20 PROCEDURE — 80053 COMPREHEN METABOLIC PANEL: CPT | Performed by: EMERGENCY MEDICINE

## 2022-07-20 PROCEDURE — 84484 ASSAY OF TROPONIN QUANT: CPT | Performed by: EMERGENCY MEDICINE

## 2022-07-20 RX ADMIN — IOHEXOL 100 ML: 350 INJECTION, SOLUTION INTRAVENOUS at 12:07

## 2022-07-20 NOTE — TELEPHONE ENCOUNTER
----- Message from Alice Gaming sent at 7/20/2022  9:07 AM CDT -----  Regarding: appt  Contact: Patient  Patient requesting same day appt for a situation that she was seen for on Monday, not getting better, please call her back at [phone]

## 2022-07-20 NOTE — TELEPHONE ENCOUNTER
Called and spoke with pt. She stated that she is now having all the same symptoms again that she had the other day. I explained that ADELAIDA Flores whom she saw is not in office on Wednesday's. That I could schedule her with Loraine Storey NP who works with Dr. Wilkins. She stated that they thought this could be related to the steroids that she was taking. However she was good yesterday and then now all the sudden all symptoms are back and she has not taken the steroids since Monday. She is asking if there was anyway we could just send her to do testing. I explained before we can place any orders she would have to be seen. That we did do the EKG and have her scheduled with Cardio. That with out patient scheduling it is a little harder to get things same day as we have to worry about getting insurance approved and such. That typically when things like this happen is why we often refer to the ED as they can do any testing needed right there and it is approved as it is on an emergency basis. She asked to book the appointment for today. It is at 2:20 with Loraine. However she did state that most likely she was going to the ED. As she is suppose to be flying out tomorrow for a trip.

## 2022-07-20 NOTE — ED PROVIDER NOTES
"SCRIBE #1 NOTE: I, Chava Mitchell, am scribing for, and in the presence of, Clive Low MD. I have scribed the entire note.      History      Chief Complaint   Patient presents with    Shortness of Breath     PT reports shortness of breath, "tugging feeling" in left arm and neck, clammy feeling and dizziness in the head    Chest Pain       Review of patient's allergies indicates:   Allergen Reactions    Tomato (solanum lycopersicum) Anaphylaxis    Cpd vehicle sol.sugarfree no.1      Agave    Shellfish containing products Itching    Sulfites     Tomato     Sulfa (sulfonamide antibiotics) Rash        HPI   HPI    7/20/2022, 10:40 AM   History obtained from the patient      History of Present Illness: Alma Vivar is a 56 y.o. female patient who presents to the Emergency Department for SOB, onset 1 week PTA. Symptoms are intermittent and moderate in severity, with her current episode having started this morning. No mitigating or exacerbating factors reported. Associated sxs include LUE pain, L-sided chest discomfort, and palpitations. Patient denies any fever, chills, n/v/d, weakness, numbness, dizziness, headache, and all other sxs at this time. Pt states that she as on prednisone for poison ivy, but was discontinued off the medication 3 days ago after seeing her PCP. Pt is scheduled for a cardiac stress test in 6 days. No further complaints or concerns at this time.     Arrival mode: Personal vehicle    PCP: Candis Wilkins MD       Past Medical History:  Past Medical History:   Diagnosis Date    Abnormal glandular Papanicolaou smear of cervix 8/20/2018 10:09:15 AM    [facility] - [facility]: Abnormal PAP Smear-No Additional Notes    Abnormal glandular Papanicolaou smear of cervix 8/20/2018 10:09:15 AM    [facility] - [facility]: Abnormal PAP Smear-No Additional Notes    Hormone replacement therapy (HRT)     Human papillomavirus in conditions classified elsewhere and of " unspecified site 10/6/2020 8:36:36 AM    [facility] - [facility] Add: HPV in female-No Additional Notes    Human papillomavirus in conditions classified elsewhere and of unspecified site 10/6/2020 8:36:36 AM    [facility] - [facility] Add: HPV in female-No Additional Notes    Hyperlipidemia     Other specified noninflammatory disorder of vagina 2018 9:52:03 AM    [facility] Historical - [facility] Add: Vaginal lesion-No Additional Notes    Other specified noninflammatory disorder of vagina 2018 9:52:03 AM    [facility] - [facility] Add: Vaginal Lesion-No Additional Notes       Past Surgical History:  Past Surgical History:   Procedure Laterality Date     SECTION      HYSTERECTOMY           Family History:  Family History   Problem Relation Age of Onset    Hypertension Mother     Bipolar disorder Father     Cirrhosis Father     Kidney failure Father     Ovarian cancer Maternal Grandmother        Social History:  Social History     Tobacco Use    Smoking status: Never Smoker    Smokeless tobacco: Never Used   Substance and Sexual Activity    Alcohol use: Yes     Comment: wine    Drug use: No    Sexual activity: Not on file       ROS   Review of Systems   Constitutional: Negative for chills and fever.   HENT: Negative for sore throat.    Respiratory: Positive for shortness of breath (intermittent).    Cardiovascular: Positive for chest pain (L-sided discomfort) and palpitations.   Gastrointestinal: Negative for diarrhea, nausea and vomiting.   Genitourinary: Negative for dysuria.   Musculoskeletal: Positive for myalgias (LUE). Negative for back pain.   Skin: Negative for rash.   Neurological: Negative for dizziness, weakness, light-headedness, numbness and headaches.   Hematological: Does not bruise/bleed easily.   All other systems reviewed and are negative.    Physical Exam      Initial Vitals [22 1027]   BP Pulse Resp Temp SpO2   (!) 156/81 81 20 98.4 °F  "(36.9 °C) 99 %      MAP       --          Physical Exam  Nursing Notes and Vital Signs Reviewed.  Constitutional: Patient is in no acute distress. Well-developed and well-nourished.  Head: Atraumatic. Normocephalic.  Eyes: PERRL. EOM intact. Conjunctivae are not pale. No scleral icterus.  ENT: Mucous membranes are moist. Oropharynx is clear and symmetric.    Neck: Supple. Full ROM.   Cardiovascular: Regular rate. Regular rhythm. No murmurs, rubs, or gallops. Distal pulses are 2+ and symmetric.  Pulmonary/Chest: No respiratory distress. Clear to auscultation bilaterally. No wheezing or rales.  Abdominal: Soft and non-distended.  There is no tenderness.  No rebound, guarding, or rigidity.   Musculoskeletal: Moves all extremities. No obvious deformities. No edema.  Skin: Warm and dry.  Neurological:  Alert, awake, and appropriate.  Normal speech.  No acute focal neurological deficits are appreciated.  Psychiatric: Normal affect. Good eye contact. Appropriate in content.    ED Course    Procedures  ED Vital Signs:  Vitals:    07/20/22 1027 07/20/22 1149   BP: (!) 156/81    Pulse: 81 69   Resp: 20    Temp: 98.4 °F (36.9 °C)    TempSrc: Oral    SpO2: 99%    Weight: 65.9 kg (145 lb 4.5 oz)    Height: 5' 1" (1.549 m)        Abnormal Lab Results:  Labs Reviewed   CBC W/ AUTO DIFFERENTIAL - Abnormal; Notable for the following components:       Result Value    Immature Granulocytes 0.7 (*)     Immature Grans (Abs) 0.07 (*)     All other components within normal limits   URINALYSIS, REFLEX TO URINE CULTURE - Abnormal; Notable for the following components:    Color, UA Colorless (*)     Specific Gravity, UA <1.005 (*)     All other components within normal limits    Narrative:     Specimen Source->Urine   COMPREHENSIVE METABOLIC PANEL   B-TYPE NATRIURETIC PEPTIDE   CK   TROPONIN I   SARS-COV-2 RDRP GENE    Narrative:     This test utilizes isothermal nucleic acid amplification   technology to detect the SARS-CoV-2 RdRp nucleic " acid segment.   The analytical sensitivity (limit of detection) is 125 genome   equivalents/mL.   A POSITIVE result implies infection with the SARS-CoV-2 virus;   the patient is presumed to be contagious.     A NEGATIVE result means that SARS-CoV-2 nucleic acids are not   present above the limit of detection. A NEGATIVE result should be   treated as presumptive. It does not rule out the possibility of   COVID-19 and should not be the sole basis for treatment decisions.   If COVID-19 is strongly suspected based on clinical and exposure   history, re-testing using an alternate molecular assay should be   considered.   This test is only for use under the Food and Drug   Administration s Emergency Use Authorization (EUA).   Commercial kits are provided by [employer].   Performance characteristics of the EUA have been independently   verified by [facility] Department of   Pathology and Laboratory Medicine.   _________________________________________________________________   The authorized Fact Sheet for Healthcare Providers and the authorized Fact   Sheet for Patients of the ID NOW COVID-19 are available on the FDA   website:     https://www.fda.gov/media/776823/download  https://www.fda.gov/media/137288/download           POCT INFLUENZA A/B MOLECULAR        All Lab Results:  Results for orders placed or performed during the hospital encounter of 07/20/22   CBC Auto Differential   Result Value Ref Range    WBC 10.40 3.90 - 12.70 K/uL    RBC 4.78 4.00 - 5.40 M/uL    Hemoglobin 14.3 12.0 - 16.0 g/dL    Hematocrit 43.2 37.0 - 48.5 %    MCV 90 82 - 98 fL    MCH 29.9 27.0 - 31.0 pg    MCHC 33.1 32.0 - 36.0 g/dL    RDW 12.6 11.5 - 14.5 %    Platelets 308 150 - 450 K/uL    MPV 10.1 9.2 - 12.9 fL    Immature Granulocytes 0.7 (H) 0.0 - 0.5 %    Gran # (ANC) 5.4 1.8 - 7.7 K/uL    Immature Grans (Abs) 0.07 (H) 0.00 - 0.04 K/uL    Lymph # 3.9 1.0 - 4.8 K/uL    Mono # 0.9 0.3 - 1.0 K/uL    Eos # 0.1 0.0 - 0.5  K/uL    Baso # 0.04 0.00 - 0.20 K/uL    nRBC 0 0 /100 WBC    Gran % 51.9 38.0 - 73.0 %    Lymph % 37.2 18.0 - 48.0 %    Mono % 8.7 4.0 - 15.0 %    Eosinophil % 1.1 0.0 - 8.0 %    Basophil % 0.4 0.0 - 1.9 %    Differential Method Automated    Comprehensive Metabolic Panel   Result Value Ref Range    Sodium 139 136 - 145 mmol/L    Potassium 5.0 3.5 - 5.1 mmol/L    Chloride 103 95 - 110 mmol/L    CO2 23 23 - 29 mmol/L    Glucose 86 70 - 110 mg/dL    BUN 17 6 - 20 mg/dL    Creatinine 0.9 0.5 - 1.4 mg/dL    Calcium 9.2 8.7 - 10.5 mg/dL    Total Protein 8.4 6.0 - 8.4 g/dL    Albumin 3.9 3.5 - 5.2 g/dL    Total Bilirubin 0.4 0.1 - 1.0 mg/dL    Alkaline Phosphatase 122 55 - 135 U/L    AST 23 10 - 40 U/L    ALT 16 10 - 44 U/L    Anion Gap 13 8 - 16 mmol/L    eGFR if African American >60 >60 mL/min/1.73 m^2    eGFR if non African American >60 >60 mL/min/1.73 m^2   Urinalysis, Reflex to Urine Culture Urine, Clean Catch    Specimen: Urine   Result Value Ref Range    Specimen UA Urine, Clean Catch     Color, UA Colorless (A) Yellow, Straw, Lucille    Appearance, UA Clear Clear    pH, UA 7.0 5.0 - 8.0    Specific Gravity, UA <1.005 (A) 1.005 - 1.030    Protein, UA Negative Negative    Glucose, UA Negative Negative    Ketones, UA Negative Negative    Bilirubin (UA) Negative Negative    Occult Blood UA Negative Negative    Nitrite, UA Negative Negative    Urobilinogen, UA Negative <2.0 EU/dL    Leukocytes, UA Negative Negative   BNP   Result Value Ref Range    BNP <10 0 - 99 pg/mL   CK   Result Value Ref Range    CPK 57 20 - 180 U/L   Troponin I   Result Value Ref Range    Troponin I <0.006 0.000 - 0.026 ng/mL   POCT COVID-19 Rapid Screening   Result Value Ref Range    POC Rapid COVID Negative Negative     Acceptable Yes    POCT Influenza A/B Molecular   Result Value Ref Range    POC Molecular Influenza A Ag Negative Negative, Not Reported    POC Molecular Influenza B Ag Negative Negative, Not Reported    Quality  Control Acceptable Yes      Imaging Results:  Imaging Results          CTA Chest Non-Coronary(PE Studies) (Final result)  Result time 07/20/22 13:15:27    Final result by Alvino Villaseñor MD (07/20/22 13:15:27)                 Impression:      No evidence of pulmonary embolism.    See additional findings above    All CT scans at this facility use dose modulation, iterative reconstruction and/or weight based dosing when appropriate to reduce radiation dose to as low as reasonably achievable.      Electronically signed by: Alvino Villaseñor MD  Date:    07/20/2022  Time:    13:15             Narrative:    EXAMINATION:  CTA CHEST NON CORONARY    CLINICAL HISTORY:  Chest pain and shortness of breath    TECHNIQUE:  After the intravenous administration of 100 cc of Omni 350 nonionic contrast using CT pulmonary angio technique, 1.25  Mm axial images were acquired using helical CT technique from the lung apices through costophrenic sulci.  Sagittal coronal and oblique MIPS were also submitted for interpretation.    COMPARISON:  Chest x-ray dated 07/20/2022    FINDINGS:  -Pulmonary arteries: Pulmonary arteries are well opacified.  No evidence of pulmonary embolism.  No evidence of pulmonary hypertension.  No right heart strain is identified with RV/LV ratio < 0.9.    -Lungs: No nodules or infiltrates.    -Pleura: No thickening or fluid.    -Mediastinum/Shahla:No significant adenopathy    -Axilla: No adenopathy.    -Thyroid: Normal lower gland.    -Heart/Aorta: Heart size is normal.  Moderate  coronary artery disease.  No pericardial effusion. Aorta normal caliber.   Aorta demonstrates moderate atherosclerotic disease.    -Bones/Chest Wall: Intact    -Upper Abdomen: Moderate constipation.  Small hiatal hernia                               X-Ray Chest AP Portable (Final result)  Result time 07/20/22 11:38:23    Final result by Su Gould MD (07/20/22 11:38:23)                 Impression:      No acute radiographic  abnormality.      Electronically signed by: Su Gould  Date:    07/20/2022  Time:    11:38             Narrative:    EXAMINATION:  XR CHEST AP PORTABLE    CLINICAL HISTORY:  chest pain;    TECHNIQUE:  Single frontal view of the chest was performed.    COMPARISON:  None    FINDINGS:  ECG monitoring lead overlies the right hemithorax.The lungs are clear, with normal appearance of pulmonary vasculature and no pleural effusion or pneumothorax.    The cardiac silhouette is normal in size. The hilar and mediastinal contours are unremarkable.    Bones are intact.                               The EKG was ordered, reviewed, and independently interpreted by the ED provider.  Interpretation time: 10:25  Rate: 64 BPM  Rhythm: normal sinus rhythm  Interpretation: No acute ST changes. No STEMI.           The Emergency Provider reviewed the vital signs and test results, which are outlined above.    ED Discussion     1:23 PM: Reassessed pt at this time. Discussed with pt all pertinent ED information and results. Discussed pt dx and plan of tx. Gave pt all f/u and return to the ED instructions. All questions and concerns were addressed at this time. Pt expresses understanding of information and instructions, and is comfortable with plan to discharge. Pt is stable for discharge.    I discussed with patient and/or family/caretaker that evaluation in the ED does not suggest any emergent or life threatening medical conditions requiring immediate intervention beyond what was provided in the ED, and I believe patient is safe for discharge.  Regardless, an unremarkable evaluation in the ED does not preclude the development or presence of a serious of life threatening condition. As such, patient was instructed to return immediately for any worsening or change in current symptoms.         ED Medication(s):  Medications   iohexoL (OMNIPAQUE 350) injection 100 mL (100 mLs Intravenous Given 7/20/22 5760)        Follow-up Information      Candis Wilkins MD.    Specialty: Family Medicine  Contact information:  [address]  [address]  [place] [address]  [phone]                        New Prescriptions    No medications on file         Medical Decision Making    Medical Decision Making:   Clinical Tests:   Lab Tests: Ordered and Reviewed  Radiological Study: Ordered and Reviewed  Medical Tests: Ordered and Reviewed           Scribe Attestation:   Scribe #1: I performed the above scribed service and the documentation accurately describes the services I performed. I attest to the accuracy of the note.    Attending:   Physician Attestation Statement for Scribe #1: I, Clive Low MD, personally performed the services described in this documentation, as scribed by Chava Mitchell, in my presence, and it is both accurate and complete.          Clinical Impression       ICD-10-CM ICD-9-CM   1. Chest pain  R07.9 786.50   2. SOB (shortness of breath)  R06.02 786.05       Disposition:   Disposition: Discharged  Condition: Stable         Clive Low MD  07/20/22 4811

## 2022-07-21 ENCOUNTER — PATIENT MESSAGE (OUTPATIENT)
Dept: PRIMARY CARE CLINIC | Facility: CLINIC | Age: 57
End: 2022-07-21
Payer: COMMERCIAL

## 2022-07-21 DIAGNOSIS — Z86.39 HISTORY OF IRON DEFICIENCY: Primary | ICD-10-CM

## 2022-07-22 ENCOUNTER — TELEPHONE (OUTPATIENT)
Dept: PRIMARY CARE CLINIC | Facility: CLINIC | Age: 57
End: 2022-07-22
Payer: COMMERCIAL

## 2022-07-22 NOTE — TELEPHONE ENCOUNTER
----- Message from Ovidioyah Scar sent at 7/22/2022  8:55 AM CDT -----  Contact: Alma  Type:  Sooner Apoointment Request    Caller is requesting a sooner appointment.  Caller declined first available appointment listed below.  Caller will not accept being placed on the waitlist and is requesting a message be sent to doctor.  Name of Caller:Alma  When is the first available appointment?8/17  Symptoms: discuss lab results   Would the patient rather a call back or a response via [facility]? Call back  Best Call Back Number:[phone]   Additional Information: Virtually

## 2022-07-26 ENCOUNTER — OFFICE VISIT (OUTPATIENT)
Dept: CARDIOLOGY | Facility: CLINIC | Age: 57
End: 2022-07-26
Payer: COMMERCIAL

## 2022-07-26 VITALS
OXYGEN SATURATION: 99 % | HEIGHT: 61 IN | SYSTOLIC BLOOD PRESSURE: 140 MMHG | WEIGHT: 138.69 LBS | HEART RATE: 65 BPM | BODY MASS INDEX: 26.19 KG/M2 | DIASTOLIC BLOOD PRESSURE: 62 MMHG

## 2022-07-26 DIAGNOSIS — E78.00 ELEVATED LDL CHOLESTEROL LEVEL: Primary | ICD-10-CM

## 2022-07-26 DIAGNOSIS — R07.9 CHEST PAIN, UNSPECIFIED TYPE: ICD-10-CM

## 2022-07-26 DIAGNOSIS — R03.0 ELEVATED BLOOD PRESSURE READING: ICD-10-CM

## 2022-07-26 PROCEDURE — 3077F PR MOST RECENT SYSTOLIC BLOOD PRESSURE >= 140 MM HG: ICD-10-PCS | Mod: CPTII,S$GLB,, | Performed by: STUDENT IN AN ORGANIZED HEALTH CARE EDUCATION/TRAINING PROGRAM

## 2022-07-26 PROCEDURE — 3077F SYST BP >= 140 MM HG: CPT | Mod: CPTII,S$GLB,, | Performed by: STUDENT IN AN ORGANIZED HEALTH CARE EDUCATION/TRAINING PROGRAM

## 2022-07-26 PROCEDURE — 1159F MED LIST DOCD IN RCRD: CPT | Mod: CPTII,S$GLB,, | Performed by: STUDENT IN AN ORGANIZED HEALTH CARE EDUCATION/TRAINING PROGRAM

## 2022-07-26 PROCEDURE — 99204 OFFICE O/P NEW MOD 45 MIN: CPT | Mod: S$GLB,,, | Performed by: STUDENT IN AN ORGANIZED HEALTH CARE EDUCATION/TRAINING PROGRAM

## 2022-07-26 PROCEDURE — 3008F PR BODY MASS INDEX (BMI) DOCUMENTED: ICD-10-PCS | Mod: CPTII,S$GLB,, | Performed by: STUDENT IN AN ORGANIZED HEALTH CARE EDUCATION/TRAINING PROGRAM

## 2022-07-26 PROCEDURE — 3044F PR MOST RECENT HEMOGLOBIN A1C LEVEL <7.0%: ICD-10-PCS | Mod: CPTII,S$GLB,, | Performed by: STUDENT IN AN ORGANIZED HEALTH CARE EDUCATION/TRAINING PROGRAM

## 2022-07-26 PROCEDURE — 99204 PR OFFICE/OUTPT VISIT, NEW, LEVL IV, 45-59 MIN: ICD-10-PCS | Mod: S$GLB,,, | Performed by: STUDENT IN AN ORGANIZED HEALTH CARE EDUCATION/TRAINING PROGRAM

## 2022-07-26 PROCEDURE — 1159F PR MEDICATION LIST DOCUMENTED IN MEDICAL RECORD: ICD-10-PCS | Mod: CPTII,S$GLB,, | Performed by: STUDENT IN AN ORGANIZED HEALTH CARE EDUCATION/TRAINING PROGRAM

## 2022-07-26 PROCEDURE — 3078F DIAST BP <80 MM HG: CPT | Mod: CPTII,S$GLB,, | Performed by: STUDENT IN AN ORGANIZED HEALTH CARE EDUCATION/TRAINING PROGRAM

## 2022-07-26 PROCEDURE — 3078F PR MOST RECENT DIASTOLIC BLOOD PRESSURE < 80 MM HG: ICD-10-PCS | Mod: CPTII,S$GLB,, | Performed by: STUDENT IN AN ORGANIZED HEALTH CARE EDUCATION/TRAINING PROGRAM

## 2022-07-26 PROCEDURE — 3044F HG A1C LEVEL LT 7.0%: CPT | Mod: CPTII,S$GLB,, | Performed by: STUDENT IN AN ORGANIZED HEALTH CARE EDUCATION/TRAINING PROGRAM

## 2022-07-26 PROCEDURE — 99999 PR PBB SHADOW E&M-EST. PATIENT-LVL IV: ICD-10-PCS | Mod: PBBFAC,,, | Performed by: STUDENT IN AN ORGANIZED HEALTH CARE EDUCATION/TRAINING PROGRAM

## 2022-07-26 PROCEDURE — 3008F BODY MASS INDEX DOCD: CPT | Mod: CPTII,S$GLB,, | Performed by: STUDENT IN AN ORGANIZED HEALTH CARE EDUCATION/TRAINING PROGRAM

## 2022-07-26 PROCEDURE — 99999 PR PBB SHADOW E&M-EST. PATIENT-LVL IV: CPT | Mod: PBBFAC,,, | Performed by: STUDENT IN AN ORGANIZED HEALTH CARE EDUCATION/TRAINING PROGRAM

## 2022-07-26 NOTE — PROGRESS NOTES
Section of Cardiology                  Cardiac Clinic Note    Chief Complaint/Reason for consultation:  Chest pain      HPI:   Alma Vivar is a 56 y.o. female with h/o no significant PMH who was referred to cardiology clinic by ADELAIDA Romo for evaluation.      7/26/2022  Has been seen in the ED for chest pain/shortness of breath symptoms x2, negative workup  First episode of chest pain was in [place], about 3 weeks ago, had a pulling sensation in her chest with SOB (can't take deep breaths)  Got poison ivy (allergic) and had to take prednisone (symptoms started when taking prednisone) for 2 weeks   Other symptoms occur with waking up   Get LH at time and palpitations also  Symptoms last hours   Symptoms don't worsen with activity  Exercises regularly, but has stopped due to symptoms, at least 4 days a week   She is an E[employer] speciality, travels for work and family    Active at home and generally     denies tobacco abuse. ETOH occ  Family hx: HTN, cancer     Denies syncope.  Denies DM, HTN, CVA    EKG 7/20/2022 NSR, no acute ST - T wave changes    ECHO      STRESS TEST    LHC      ROS: All 10 systems reviewed. Please refer to the HPI for pertinent positives. All other systems negative.     Past Medical History  Past Medical History:   Diagnosis Date    Abnormal glandular Papanicolaou smear of cervix 8/20/2018 10:09:15 AM    [facility]: Abnormal PAP Smear-No Additional Notes    Abnormal glandular Papanicolaou smear of cervix 8/20/2018 10:09:15 AM    [facility]: Abnormal PAP Smear-No Additional Notes    Hormone replacement therapy (HRT)     Human papillomavirus in conditions classified elsewhere and of unspecified site 10/6/2020 8:36:36 AM    [facility] - [facility]: HPV in female-No Additional Notes    Human papillomavirus in conditions classified elsewhere and of unspecified site 10/6/2020 8:36:36 AM    [facility] - [facility]: HPV  in female-No Additional Notes    Hyperlipidemia     Other specified noninflammatory disorder of vagina 2018 9:52:03 AM    [facility] - [facility]: Vaginal lesion-No Additional Notes    Other specified noninflammatory disorder of vagina 2018 9:52:03 AM    [facility] Historical - [facility]: Vaginal Lesion-No Additional Notes       Surgical History  Past Surgical History:   Procedure Laterality Date     SECTION      HYSTERECTOMY            Allergies:   Review of patient's allergies indicates:   Allergen Reactions    Tomato (solanum lycopersicum) Anaphylaxis    Cpd vehicle sol.sugarfree no.1      Agave    Shellfish containing products Itching    Sulfites     Tomato     Sulfa (sulfonamide antibiotics) Rash       Social History:  Social History     Socioeconomic History    Marital status:      Spouse name: Roger    Number of children: 2   Occupational History     Comment: .    Tobacco Use    Smoking status: Never Smoker    Smokeless tobacco: Never Used   Substance and Sexual Activity    Alcohol use: Yes     Comment: wine    Drug use: No       Family History:  family history includes Bipolar disorder in her father; Cirrhosis in her father; Hypertension in her mother; Kidney failure in her father; Ovarian cancer in her maternal grandmother.    Home Medications:  Current Outpatient Medications on File Prior to Visit   Medication Sig Dispense Refill    aspirin (ECOTRIN) 81 MG EC tablet Take 81 mg by mouth once daily.      fexofenadine (ALLEGRA) 180 MG tablet Take 180 mg by mouth once daily.      fluticasone (VERAMYST) 27.5 mcg/actuation nasal spray 2 sprays by Nasal route once daily. 15.8 mL 3    estradiol (ESTRACE) 1 MG tablet Take 1 mg by mouth once daily.       No current facility-administered medications on file prior to visit.       Physical exam:  BP (!) 140/62 (BP Location: Right arm, Patient Position: Sitting, BP Method: Medium (Manual))    "Pulse 65   Ht 5' 1" (1.549 m)   Wt 62.9 kg (138 lb 10.7 oz)   SpO2 99%   BMI 26.20 kg/m²         General: Pt is a 56 y.o. year old female who is AAOx3, in NAD, is pleasant, well nourished, looks stated age  HEENT: PERRL, EOMI, Oral mucosa pink & moist  CVS  No abnormal cardiac pulsations noted on inspection. JVP not raised. The apical impulse is normal on palpation, and is located in the left 5th intercostal space in the mid - clavicular line. No palpable thrills or abnormal pulsations noted. RR, S1 - S2 heard, no murmurs, rubs or gallops appreciated.   PUL : CTA B/L. No wheezes/crackles heard   ABD : BS +, soft. No tenderness elicited   LE : No C/C/E. Distal Pulses palpable B/L         LABS:    Chemistry:   Lab Results   Component Value Date     07/20/2022    K 5.0 07/20/2022     07/20/2022    CO2 23 07/20/2022    BUN 17 07/20/2022    CREATININE 0.9 07/20/2022    CALCIUM 9.2 07/20/2022     Cardiac Markers:   Lab Results   Component Value Date    TROPONINI <0.006 07/20/2022     Cardiac Markers (Last 3):   Lab Results   Component Value Date    TROPONINI <0.006 07/20/2022     CBC:   Lab Results   Component Value Date    WBC 10.40 07/20/2022    HGB 14.3 07/20/2022    HCT 43.2 07/20/2022    MCV 90 07/20/2022     07/20/2022     Lipids:   Lab Results   Component Value Date    CHOL 255 (H) 07/19/2022    TRIG 76 07/19/2022    HDL 73 07/19/2022    HDL 51 08/07/2015     Coagulation: No results found for: PT, INR, APTT        Assessment        1. Elevated LDL cholesterol level    2. Chest pain, unspecified type    3. Elevated blood pressure reading         Plan:     Chest pain  Occurred on multiple episodes  Obtain treadmill stress test  Obtain echocardiogram    Elevated blood pressure reading  No history of hypertension  Recheck next visit in doing stress test    Elevated LDL level  Reports diet and exercise, do not eat much protein  10 year ASCVD risk 2.3%      This note was prepared using voice " recognition system and is likely to have sound alike errors that may have been overlooked even after proofreading.     I have reviewed all pertinent chart information.  Plans and recommendations have been formulated under my direct supervision. All questions answered and patient voiced understanding.   If symptoms persist go to the ED.    RTC in 1 month         Letty Matthews MD  Cardiology

## 2022-07-27 ENCOUNTER — HOSPITAL ENCOUNTER (OUTPATIENT)
Dept: CARDIOLOGY | Facility: HOSPITAL | Age: 57
Discharge: HOME | End: 2022-07-27
Attending: STUDENT IN AN ORGANIZED HEALTH CARE EDUCATION/TRAINING PROGRAM
Payer: COMMERCIAL

## 2022-07-27 VITALS — WEIGHT: 138 LBS | HEIGHT: 61 IN | BODY MASS INDEX: 26.06 KG/M2

## 2022-07-27 DIAGNOSIS — R07.9 CHEST PAIN, UNSPECIFIED TYPE: ICD-10-CM

## 2022-07-27 LAB
AV INDEX (PROSTH): 0.99
AV MEAN GRADIENT: 3 MMHG
AV PEAK GRADIENT: 7 MMHG
AV VALVE AREA: 3.05 CM2
AV VELOCITY RATIO: 1.01
BSA FOR ECHO PROCEDURE: 1.64 M2
CV ECHO LV RWT: 0.43 CM
CV STRESS BASE HR: 94 BPM
DIASTOLIC BLOOD PRESSURE: 64 MMHG
DOP CALC AO PEAK VEL: 1.28 M/S
DOP CALC AO VTI: 25.8 CM
DOP CALC LVOT AREA: 3.1 CM2
DOP CALC LVOT DIAMETER: 1.98 CM
DOP CALC LVOT PEAK VEL: 1.29 M/S
DOP CALC LVOT STROKE VOLUME: 78.78 CM3
DOP CALC RVOT PEAK VEL: 0.77 M/S
DOP CALC RVOT VTI: 14.9 CM
DOP CALCLVOT PEAK VEL VTI: 25.6 CM
E WAVE DECELERATION TIME: 179.37 MSEC
E/A RATIO: 0.95
E/E' RATIO: 6.43 M/S
ECHO LV POSTERIOR WALL: 0.78 CM (ref 0.6–1.1)
EJECTION FRACTION: 65 %
FRACTIONAL SHORTENING: 36 % (ref 28–44)
INTERVENTRICULAR SEPTUM: 0.75 CM (ref 0.6–1.1)
IVRT: 91.34 MSEC
LA MAJOR: 3.6 CM
LA MINOR: 4.19 CM
LA WIDTH: 3.2 CM
LEFT ATRIUM SIZE: 3.47 CM
LEFT ATRIUM VOLUME INDEX MOD: 13.5 ML/M2
LEFT ATRIUM VOLUME INDEX: 22.7 ML/M2
LEFT ATRIUM VOLUME MOD: 21.7 CM3
LEFT ATRIUM VOLUME: 36.55 CM3
LEFT INTERNAL DIMENSION IN SYSTOLE: 2.3 CM (ref 2.1–4)
LEFT VENTRICLE DIASTOLIC VOLUME INDEX: 34.07 ML/M2
LEFT VENTRICLE DIASTOLIC VOLUME: 54.86 ML
LEFT VENTRICLE MASS INDEX: 46 G/M2
LEFT VENTRICLE SYSTOLIC VOLUME INDEX: 11.3 ML/M2
LEFT VENTRICLE SYSTOLIC VOLUME: 18.18 ML
LEFT VENTRICULAR INTERNAL DIMENSION IN DIASTOLE: 3.61 CM (ref 3.5–6)
LEFT VENTRICULAR MASS: 74.44 G
LV LATERAL E/E' RATIO: 6.17 M/S
LV SEPTAL E/E' RATIO: 6.73 M/S
LVOT MG: 3.22 MMHG
LVOT MV: 0.83 CM/S
MV PEAK A VEL: 0.78 M/S
MV PEAK E VEL: 0.74 M/S
OHS CV CPX 1 MINUTE RECOVERY HEART RATE: 150 BPM
OHS CV CPX 85 PERCENT MAX PREDICTED HEART RATE MALE: 134
OHS CV CPX ESTIMATED METS: 10
OHS CV CPX MAX PREDICTED HEART RATE: 158
OHS CV CPX PATIENT IS FEMALE: 1
OHS CV CPX PATIENT IS MALE: 0
OHS CV CPX PEAK DIASTOLIC BLOOD PRESSURE: 89 MMHG
OHS CV CPX PEAK HEAR RATE: 121 BPM
OHS CV CPX PEAK RATE PRESSURE PRODUCT: NORMAL
OHS CV CPX PEAK SYSTOLIC BLOOD PRESSURE: 192 MMHG
OHS CV CPX PERCENT MAX PREDICTED HEART RATE ACHIEVED: 77
OHS CV CPX RATE PRESSURE PRODUCT PRESENTING: NORMAL
PISA TR MAX VEL: 2.1 M/S
PULM VEIN S/D RATIO: 1.45
PV MEAN GRADIENT: 1.23 MMHG
PV PEAK D VEL: 0.37 M/S
PV PEAK S VEL: 0.54 M/S
PV PEAK VELOCITY: 0.88 CM/S
RA MAJOR: 4.03 CM
RA PRESSURE: 3 MMHG
RA WIDTH: 2.59 CM
RIGHT VENTRICULAR END-DIASTOLIC DIMENSION: 2.96 CM
SINUS: 2.67 CM
STJ: 2.4 CM
STRESS ECHO POST EXERCISE DUR MIN: 9 MINUTES
STRESS ECHO POST EXERCISE DUR SEC: 12 SECONDS
STRESS ST DEPRESSION: 1.5 MM
SYSTOLIC BLOOD PRESSURE: 110 MMHG
TDI LATERAL: 0.12 M/S
TDI SEPTAL: 0.11 M/S
TDI: 0.12 M/S
TR MAX PG: 18 MMHG
TRICUSPID ANNULAR PLANE SYSTOLIC EXCURSION: 2.35 CM
TV REST PULMONARY ARTERY PRESSURE: 21 MMHG

## 2022-07-27 PROCEDURE — 93306 ECHO (CUPID ONLY): ICD-10-PCS | Mod: 26,,, | Performed by: INTERNAL MEDICINE

## 2022-07-27 PROCEDURE — 93306 TTE W/DOPPLER COMPLETE: CPT | Mod: 26,,, | Performed by: INTERNAL MEDICINE

## 2022-07-27 PROCEDURE — 93016 EXERCISE STRESS - EKG (CUPID ONLY): ICD-10-PCS | Mod: ,,, | Performed by: INTERNAL MEDICINE

## 2022-07-27 PROCEDURE — 93018 CV STRESS TEST I&R ONLY: CPT | Mod: ,,, | Performed by: INTERNAL MEDICINE

## 2022-07-27 PROCEDURE — 93306 TTE W/DOPPLER COMPLETE: CPT

## 2022-07-27 PROCEDURE — 93016 CV STRESS TEST SUPVJ ONLY: CPT | Mod: ,,, | Performed by: INTERNAL MEDICINE

## 2022-07-27 PROCEDURE — 93017 CV STRESS TEST TRACING ONLY: CPT

## 2022-07-27 PROCEDURE — 93018 EXERCISE STRESS - EKG (CUPID ONLY): ICD-10-PCS | Mod: ,,, | Performed by: INTERNAL MEDICINE

## 2022-07-29 ENCOUNTER — LAB VISIT (OUTPATIENT)
Dept: LAB | Facility: HOSPITAL | Age: 57
End: 2022-07-29
Attending: PHYSICIAN ASSISTANT
Payer: COMMERCIAL

## 2022-07-29 ENCOUNTER — TELEPHONE (OUTPATIENT)
Dept: CARDIOLOGY | Facility: CLINIC | Age: 57
End: 2022-07-29
Payer: COMMERCIAL

## 2022-07-29 ENCOUNTER — PATIENT MESSAGE (OUTPATIENT)
Dept: CARDIOLOGY | Facility: CLINIC | Age: 57
End: 2022-07-29
Payer: COMMERCIAL

## 2022-07-29 DIAGNOSIS — R94.31 ABNORMAL ECG DURING EXERCISE STRESS TEST: Primary | ICD-10-CM

## 2022-07-29 DIAGNOSIS — R07.9 CHEST PAIN, UNSPECIFIED TYPE: ICD-10-CM

## 2022-07-29 DIAGNOSIS — Z86.39 HISTORY OF IRON DEFICIENCY: ICD-10-CM

## 2022-07-29 LAB
FERRITIN SERPL-MCNC: 130 NG/ML (ref 20–300)
IRON SERPL-MCNC: 80 UG/DL (ref 30–160)
SATURATED IRON: 21 % (ref 20–50)
TOTAL IRON BINDING CAPACITY: 373 UG/DL (ref 250–450)
TRANSFERRIN SERPL-MCNC: 252 MG/DL (ref 200–375)

## 2022-07-29 PROCEDURE — 82728 ASSAY OF FERRITIN: CPT | Performed by: PHYSICIAN ASSISTANT

## 2022-07-29 PROCEDURE — 84466 ASSAY OF TRANSFERRIN: CPT | Performed by: PHYSICIAN ASSISTANT

## 2022-07-29 PROCEDURE — 36415 COLL VENOUS BLD VENIPUNCTURE: CPT | Mod: PO | Performed by: PHYSICIAN ASSISTANT

## 2022-07-29 NOTE — TELEPHONE ENCOUNTER
----- Message from Letty Matthews MD sent at 7/29/2022  4:26 AM CDT -----  Call patient   Stress test abnormal  Needs exercise nuclear stress test with images  Please schedule nuc

## 2022-08-17 ENCOUNTER — PATIENT MESSAGE (OUTPATIENT)
Dept: PRIMARY CARE CLINIC | Facility: CLINIC | Age: 57
End: 2022-08-17
Payer: COMMERCIAL

## 2022-08-17 ENCOUNTER — PATIENT MESSAGE (OUTPATIENT)
Dept: PRIMARY CARE CLINIC | Facility: CLINIC | Age: 57
End: 2022-08-17

## 2022-08-17 ENCOUNTER — OFFICE VISIT (OUTPATIENT)
Dept: PRIMARY CARE CLINIC | Facility: CLINIC | Age: 57
End: 2022-08-17
Payer: COMMERCIAL

## 2022-08-17 DIAGNOSIS — R07.9 CHEST PAIN, UNSPECIFIED TYPE: Primary | ICD-10-CM

## 2022-08-17 DIAGNOSIS — E78.5 HYPERLIPIDEMIA, UNSPECIFIED HYPERLIPIDEMIA TYPE: ICD-10-CM

## 2022-08-17 DIAGNOSIS — R06.83 SNORING: ICD-10-CM

## 2022-08-17 PROCEDURE — 1160F RVW MEDS BY RX/DR IN RCRD: CPT | Mod: CPTII,95,, | Performed by: FAMILY MEDICINE

## 2022-08-17 PROCEDURE — 1160F PR REVIEW ALL MEDS BY PRESCRIBER/CLIN PHARMACIST DOCUMENTED: ICD-10-PCS | Mod: CPTII,95,, | Performed by: FAMILY MEDICINE

## 2022-08-17 PROCEDURE — 1159F PR MEDICATION LIST DOCUMENTED IN MEDICAL RECORD: ICD-10-PCS | Mod: CPTII,95,, | Performed by: FAMILY MEDICINE

## 2022-08-17 PROCEDURE — 3044F PR MOST RECENT HEMOGLOBIN A1C LEVEL <7.0%: ICD-10-PCS | Mod: CPTII,95,, | Performed by: FAMILY MEDICINE

## 2022-08-17 PROCEDURE — 3044F HG A1C LEVEL LT 7.0%: CPT | Mod: CPTII,95,, | Performed by: FAMILY MEDICINE

## 2022-08-17 PROCEDURE — 99214 PR OFFICE/OUTPT VISIT, EST, LEVL IV, 30-39 MIN: ICD-10-PCS | Mod: 95,,, | Performed by: FAMILY MEDICINE

## 2022-08-17 PROCEDURE — 99214 OFFICE O/P EST MOD 30 MIN: CPT | Mod: 95,,, | Performed by: FAMILY MEDICINE

## 2022-08-17 PROCEDURE — 1159F MED LIST DOCD IN RCRD: CPT | Mod: CPTII,95,, | Performed by: FAMILY MEDICINE

## 2022-08-17 NOTE — PROGRESS NOTES
Subjective:      Patient ID: Alma Vivar is a 56 y.o. female.    Chief Complaint: Follow-up (Cardiology, )    Disclaimer:  This note is prepared using voice recognition software and as such is likely to have errors and has not been proof read. Please contact me for questions.     The patient location is: home  The chief complaint leading to consultation is: mychart request     Visit type: video and audio simultaneous    Face to Face time with patient: 1010am -1027am      30  minutes of total time spent on the encounter, which includes face to face time and non-face to face time preparing to see the patient (eg, review of tests), Obtaining and/or reviewing separately obtained history, Documenting clinical information in the electronic or other health record, Independently interpreting results (not separately reported) and communicating results to the patient/family/caregiver, or Care coordination (not separately reported).     Each patient to whom he or she provides medical services by telemedicine is:  (1) informed of the relationship between the physician and patient and the respective role of any other health care provider with respect to management of the patient; and (2) notified that he or she may decline to receive medical services by telemedicine and may withdraw from such care at any time.    Alma Vivar is a 56 y.o. female who presents to clinic for follow-up after ED visit 7/3    Was on way to airport in [place] when felt chest tightness and numbness/tingling in arm as well as lighthead and dizzy. And again another time. Seeing cards already with Dr. Matthews.   doing nuclear test next week.     Did stop the estrogen since Dec 2021.   Has been a big change in last 6 months.   Ob/gyn visit next month.   Will need to do Dexa next.     No further chest pain or dizziness. Doing morning yoga. Did hold her exercise for now till Cards releases her.   Wbc was up with steroids, and did reduce down  the next day. Feels that all of these events happened with prednisone.     Wants to retest on allergies due to multiple allergies.   She also though she had a UTI this week but went away. Had frequency and painful urination, but next day was ok.       Had been working hard in yard, could work for about 45 minutes and had to sit down/got tired   Feel like can't take normal deep breath   Had just started prednisone for poison ivy - on last day   Sleep deprived - mild snoring but gotten worse in last year -  says that there are times that snort and wake up        Follow-up  Associated symptoms include chest pain and fatigue. Pertinent negatives include no abdominal pain, arthralgias, chills, coughing, diaphoresis, fever, headaches, joint swelling, nausea, neck pain, rash, vomiting or weakness.     Lab Results   Component Value Date    HGBA1C 5.2 07/19/2022      Lab Results   Component Value Date    CHOL 255 (H) 07/19/2022     Lab Results   Component Value Date    LDLCALC 166.8 (H) 07/19/2022    LDLCALC 115 08/07/2015       Wt Readings from Last 10 Encounters:   07/27/22 62.6 kg (138 lb)   07/26/22 62.9 kg (138 lb 10.7 oz)   07/20/22 65.9 kg (145 lb 4.5 oz)   07/18/22 63.3 kg (139 lb 10.6 oz)   06/12/19 61.2 kg (135 lb)   05/21/19 61.6 kg (135 lb 12.9 oz)   03/04/19 62.8 kg (138 lb 7.2 oz)   11/07/18 64.4 kg (141 lb 15.6 oz)   09/19/18 65 kg (143 lb 4.8 oz)       The 10-year ASCVD risk score ([place] RAEANN Jr., et al., 2013) is: 2.3%    Values used to calculate the score:      Age: 56 years      Sex: Female      Is Non- : No      Diabetic: No      Tobacco smoker: No      Systolic Blood Pressure: 140 mmHg      Is BP treated: No      HDL Cholesterol: 73 mg/dL      Total Cholesterol: 255 mg/dL        Lab Results   Component Value Date    WBC 10.40 07/20/2022    HGB 14.3 07/20/2022    HCT 43.2 07/20/2022     07/20/2022    CHOL 255 (H) 07/19/2022    TRIG 76 07/19/2022    HDL 73 07/19/2022     ALT 16 07/20/2022    AST 23 07/20/2022     07/20/2022    K 5.0 07/20/2022     07/20/2022    CREATININE 0.9 07/20/2022    BUN 17 07/20/2022    CO2 23 07/20/2022    TSH 0.286 (L) 07/19/2022    HGBA1C 5.2 07/19/2022       Echo  · The left ventricle is normal in size with normal systolic function.  · The estimated ejection fraction is 65%.  · Normal left ventricular diastolic function.  · Normal right ventricular size with normal right ventricular systolic   function.  · Mild tricuspid regurgitation.  · Normal central venous pressure (3 mmHg).  · The estimated PA systolic pressure is 21 mmHg.     Exercise Stress - EKG    The EKG portion of this study is positive for ischemia.    The patient reported no chest pain during the stress test.    The blood pressure response to stress was normal.    There were no arrhythmias during stress.    The exercise capacity was normal.        Review of Systems   Constitutional: Positive for activity change, fatigue and unexpected weight change. Negative for chills, diaphoresis and fever.   HENT: Negative for hearing loss, rhinorrhea and trouble swallowing.    Eyes: Negative for discharge and visual disturbance.   Respiratory: Positive for chest tightness. Negative for cough and wheezing.    Cardiovascular: Positive for chest pain and palpitations.   Gastrointestinal: Negative for abdominal pain, blood in stool, constipation, diarrhea, nausea and vomiting.   Endocrine: Positive for polyuria. Negative for polydipsia.   Genitourinary: Positive for dysuria. Negative for difficulty urinating, hematuria and menstrual problem.   Musculoskeletal: Negative for arthralgias, joint swelling and neck pain.   Skin: Negative for rash.   Neurological: Negative for weakness and headaches.   Psychiatric/Behavioral: Negative for confusion and dysphoric mood.     Objective:   There were no vitals filed for this visit.  Physical Exam  Vitals reviewed.   Constitutional:       General: She is  awake. She is not in acute distress.     Appearance: Normal appearance. She is well-developed and well-groomed. She is obese. She is not ill-appearing.   HENT:      Head: Normocephalic and atraumatic.      Right Ear: External ear normal.      Left Ear: External ear normal.      Nose: Nose normal.      Mouth/Throat:      Lips: Pink.   Eyes:      Conjunctiva/sclera: Conjunctivae normal.   Pulmonary:      Effort: Pulmonary effort is normal.   Neurological:      Mental Status: She is alert.   Psychiatric:         Attention and Perception: Attention and perception normal. She is attentive.         Mood and Affect: Mood and affect normal. Mood is not anxious or depressed. Affect is not labile, blunt, angry or inappropriate.         Speech: Speech normal. She is communicative. Speech is not rapid and pressured, delayed, slurred or tangential.         Behavior: Behavior normal. Behavior is not agitated, slowed, aggressive, withdrawn, hyperactive or combative. Behavior is cooperative.         Thought Content: Thought content normal. Thought content is not paranoid or delusional. Thought content does not include homicidal or suicidal ideation. Thought content does not include homicidal or suicidal plan.         Cognition and Memory: Cognition and memory normal. Memory is not impaired. She does not exhibit impaired recent memory or impaired remote memory.         Judgment: Judgment normal. Judgment is not impulsive or inappropriate.       Assessment:     1. Chest pain, unspecified type    2. Snoring    3. Hyperlipidemia, unspecified hyperlipidemia type      Plan:   Alma was seen today for follow-up.    Diagnoses and all orders for this visit:    Chest pain, unspecified type  Comments:  Cardiology failed regular stress test going for nuclear stress testing next week    Snoring  Comments:  Has recently completed home sleep study awaiting results    Hyperlipidemia, unspecified hyperlipidemia type  Comments:  Currently with  risk score in 2.7 %; awaiting results after nuclear stress testing to determine if need for statin therapy          Health Maintenance Due   Topic Date Due    TETANUS VACCINE  Never done    Shingles Vaccine (1 of 2) Never done    Mammogram  09/21/2022       No follow-ups on file.    There are no Patient Instructions on file for this visit.

## 2022-08-18 ENCOUNTER — PROCEDURE VISIT (OUTPATIENT)
Dept: SLEEP MEDICINE | Facility: CLINIC | Age: 57
End: 2022-08-18
Payer: COMMERCIAL

## 2022-08-18 DIAGNOSIS — R06.83 SNORING: ICD-10-CM

## 2022-08-18 PROCEDURE — 95800 SLP STDY UNATTENDED: CPT

## 2022-08-18 NOTE — Clinical Note
1 night study MILD/BORDERLINE OBSTRUCTIVE SLEEP APNEA with overall AHI 9.3/hr ( 69 events): night #1 Oxygen desaturation: < 70 %. SpO2 between 90% to 94% for 1 hr 14 min. Patient snored 94% time above 50 . Heart rate range: 59 bpm - 111 bpm REC's: Please refer to sleep disorders clinic for management Therapy with APAP at 4-12 cm WP using mask of choice with heated humidification is an option. Weight loss/management. with regular exercise per direction of physician. Avoid drowsy driving. Follow up in sleep clinic to maximize adherence and ensure resolution of symptoms.

## 2022-08-24 ENCOUNTER — HOSPITAL ENCOUNTER (OUTPATIENT)
Dept: RADIOLOGY | Facility: HOSPITAL | Age: 57
Discharge: HOME | End: 2022-08-24
Attending: STUDENT IN AN ORGANIZED HEALTH CARE EDUCATION/TRAINING PROGRAM
Payer: COMMERCIAL

## 2022-08-24 ENCOUNTER — HOSPITAL ENCOUNTER (OUTPATIENT)
Dept: CARDIOLOGY | Facility: HOSPITAL | Age: 57
Discharge: HOME | End: 2022-08-24
Attending: STUDENT IN AN ORGANIZED HEALTH CARE EDUCATION/TRAINING PROGRAM
Payer: COMMERCIAL

## 2022-08-24 DIAGNOSIS — R94.31 ABNORMAL ECG DURING EXERCISE STRESS TEST: ICD-10-CM

## 2022-08-24 DIAGNOSIS — R07.9 CHEST PAIN, UNSPECIFIED TYPE: ICD-10-CM

## 2022-08-24 LAB
CV STRESS BASE HR: 88 BPM
DIASTOLIC BLOOD PRESSURE: 80 MMHG
NUC REST EJECTION FRACTION: 79
NUC STRESS EJECTION FRACTION: 82 %
OHS CV CPX 1 MINUTE RECOVERY HEART RATE: 136 BPM
OHS CV CPX 85 PERCENT MAX PREDICTED HEART RATE MALE: 134
OHS CV CPX ESTIMATED METS: 7
OHS CV CPX MAX PREDICTED HEART RATE: 158
OHS CV CPX PATIENT IS FEMALE: 1
OHS CV CPX PATIENT IS MALE: 0
OHS CV CPX PEAK DIASTOLIC BLOOD PRESSURE: 77 MMHG
OHS CV CPX PEAK HEAR RATE: 151 BPM
OHS CV CPX PEAK RATE PRESSURE PRODUCT: NORMAL
OHS CV CPX PEAK SYSTOLIC BLOOD PRESSURE: 192 MMHG
OHS CV CPX PERCENT MAX PREDICTED HEART RATE ACHIEVED: 96
OHS CV CPX RATE PRESSURE PRODUCT PRESENTING: NORMAL
STRESS ECHO POST EXERCISE DUR MIN: 6 MINUTES
STRESS ECHO POST EXERCISE DUR SEC: 1 SECONDS
SYSTOLIC BLOOD PRESSURE: 142 MMHG

## 2022-08-24 PROCEDURE — 93016 STRESS TEST WITH MYOCARDIAL PERFUSION (CUPID ONLY): ICD-10-PCS | Mod: ,,, | Performed by: INTERNAL MEDICINE

## 2022-08-24 PROCEDURE — 93016 CV STRESS TEST SUPVJ ONLY: CPT | Mod: ,,, | Performed by: INTERNAL MEDICINE

## 2022-08-24 PROCEDURE — 78452 STRESS TEST WITH MYOCARDIAL PERFUSION (CUPID ONLY): ICD-10-PCS | Mod: 26,,, | Performed by: INTERNAL MEDICINE

## 2022-08-24 PROCEDURE — 95806 SLEEP STUDY UNATT&RESP EFFT: CPT | Mod: 26,S$GLB,, | Performed by: INTERNAL MEDICINE

## 2022-08-24 PROCEDURE — 95806 PR SLEEP STUDY, UNATTENDED, SIMUL RECORD HR/O2 SAT/RESP FLOW/RESP EFFT: ICD-10-PCS | Mod: 26,S$GLB,, | Performed by: INTERNAL MEDICINE

## 2022-08-24 PROCEDURE — 93017 CV STRESS TEST TRACING ONLY: CPT

## 2022-08-24 PROCEDURE — 93018 CV STRESS TEST I&R ONLY: CPT | Mod: ,,, | Performed by: INTERNAL MEDICINE

## 2022-08-24 PROCEDURE — 93018 STRESS TEST WITH MYOCARDIAL PERFUSION (CUPID ONLY): ICD-10-PCS | Mod: ,,, | Performed by: INTERNAL MEDICINE

## 2022-08-24 PROCEDURE — A9502 TC99M TETROFOSMIN: HCPCS

## 2022-08-24 PROCEDURE — 78452 HT MUSCLE IMAGE SPECT MULT: CPT | Mod: 26,,, | Performed by: INTERNAL MEDICINE

## 2022-08-24 RX ORDER — REGADENOSON 0.08 MG/ML
0.4 INJECTION, SOLUTION INTRAVENOUS ONCE
Status: DISCONTINUED | OUTPATIENT
Start: 2022-08-24 | End: 2022-11-18

## 2022-08-24 NOTE — PROCEDURES
Home Sleep Studies    Date/Time: 8/18/2022 8:00 AM  Performed by: Erasmo Felder MD  Authorized by: Candis Romo PA-C       1 night study  MILD/BORDERLINE OBSTRUCTIVE SLEEP APNEA with overall AHI 9.3/hr ( 69 events): night #1  Oxygen desaturation: < 70 %. SpO2 between 90% to 94% for 1 hr 14 min.  Patient snored 94% time above 50 .  Heart rate range: 59 bpm - 111 bpm  REC's:  Please refer to sleep disorders clinic for management  Therapy with APAP at 4-12 cm WP using mask of choice with heated humidification is an option.  Weight loss/management. with regular exercise per direction of physician.  Avoid drowsy driving.  Follow up in sleep clinic to maximize adherence and ensure resolution of symptoms.

## 2022-08-25 ENCOUNTER — TELEPHONE (OUTPATIENT)
Dept: PRIMARY CARE CLINIC | Facility: CLINIC | Age: 57
End: 2022-08-25
Payer: COMMERCIAL

## 2022-08-25 ENCOUNTER — PATIENT MESSAGE (OUTPATIENT)
Dept: PULMONOLOGY | Facility: CLINIC | Age: 57
End: 2022-08-25
Payer: COMMERCIAL

## 2022-08-25 VITALS — SYSTOLIC BLOOD PRESSURE: 118 MMHG | DIASTOLIC BLOOD PRESSURE: 62 MMHG

## 2022-08-25 DIAGNOSIS — R06.83 SNORING: Primary | ICD-10-CM

## 2022-08-25 NOTE — TELEPHONE ENCOUNTER
----- Message from Candis Romo PA-C sent at 8/25/2022  9:29 AM CDT -----  Pls help schedule follow-up with sleep studies (if we schedule)

## 2022-08-30 ENCOUNTER — OFFICE VISIT (OUTPATIENT)
Dept: CARDIOLOGY | Facility: CLINIC | Age: 57
End: 2022-08-30
Payer: COMMERCIAL

## 2022-08-30 VITALS
HEART RATE: 76 BPM | DIASTOLIC BLOOD PRESSURE: 70 MMHG | OXYGEN SATURATION: 98 % | HEIGHT: 61 IN | WEIGHT: 142.19 LBS | BODY MASS INDEX: 26.85 KG/M2 | SYSTOLIC BLOOD PRESSURE: 112 MMHG

## 2022-08-30 DIAGNOSIS — R03.0 ELEVATED BLOOD PRESSURE READING: ICD-10-CM

## 2022-08-30 DIAGNOSIS — E78.00 ELEVATED LDL CHOLESTEROL LEVEL: ICD-10-CM

## 2022-08-30 DIAGNOSIS — R07.9 CHEST PAIN, UNSPECIFIED TYPE: ICD-10-CM

## 2022-08-30 DIAGNOSIS — R94.31 ABNORMAL ECG DURING EXERCISE STRESS TEST: Primary | ICD-10-CM

## 2022-08-30 PROCEDURE — 99214 PR OFFICE/OUTPT VISIT, EST, LEVL IV, 30-39 MIN: ICD-10-PCS | Mod: S$GLB,,, | Performed by: STUDENT IN AN ORGANIZED HEALTH CARE EDUCATION/TRAINING PROGRAM

## 2022-08-30 PROCEDURE — 3074F SYST BP LT 130 MM HG: CPT | Mod: CPTII,S$GLB,, | Performed by: STUDENT IN AN ORGANIZED HEALTH CARE EDUCATION/TRAINING PROGRAM

## 2022-08-30 PROCEDURE — 3078F DIAST BP <80 MM HG: CPT | Mod: CPTII,S$GLB,, | Performed by: STUDENT IN AN ORGANIZED HEALTH CARE EDUCATION/TRAINING PROGRAM

## 2022-08-30 PROCEDURE — 99214 OFFICE O/P EST MOD 30 MIN: CPT | Mod: S$GLB,,, | Performed by: STUDENT IN AN ORGANIZED HEALTH CARE EDUCATION/TRAINING PROGRAM

## 2022-08-30 PROCEDURE — 3008F PR BODY MASS INDEX (BMI) DOCUMENTED: ICD-10-PCS | Mod: CPTII,S$GLB,, | Performed by: STUDENT IN AN ORGANIZED HEALTH CARE EDUCATION/TRAINING PROGRAM

## 2022-08-30 PROCEDURE — 3008F BODY MASS INDEX DOCD: CPT | Mod: CPTII,S$GLB,, | Performed by: STUDENT IN AN ORGANIZED HEALTH CARE EDUCATION/TRAINING PROGRAM

## 2022-08-30 PROCEDURE — 1159F PR MEDICATION LIST DOCUMENTED IN MEDICAL RECORD: ICD-10-PCS | Mod: CPTII,S$GLB,, | Performed by: STUDENT IN AN ORGANIZED HEALTH CARE EDUCATION/TRAINING PROGRAM

## 2022-08-30 PROCEDURE — 3074F PR MOST RECENT SYSTOLIC BLOOD PRESSURE < 130 MM HG: ICD-10-PCS | Mod: CPTII,S$GLB,, | Performed by: STUDENT IN AN ORGANIZED HEALTH CARE EDUCATION/TRAINING PROGRAM

## 2022-08-30 PROCEDURE — 3078F PR MOST RECENT DIASTOLIC BLOOD PRESSURE < 80 MM HG: ICD-10-PCS | Mod: CPTII,S$GLB,, | Performed by: STUDENT IN AN ORGANIZED HEALTH CARE EDUCATION/TRAINING PROGRAM

## 2022-08-30 PROCEDURE — 3044F HG A1C LEVEL LT 7.0%: CPT | Mod: CPTII,S$GLB,, | Performed by: STUDENT IN AN ORGANIZED HEALTH CARE EDUCATION/TRAINING PROGRAM

## 2022-08-30 PROCEDURE — 99999 PR PBB SHADOW E&M-EST. PATIENT-LVL III: CPT | Mod: PBBFAC,,, | Performed by: STUDENT IN AN ORGANIZED HEALTH CARE EDUCATION/TRAINING PROGRAM

## 2022-08-30 PROCEDURE — 1159F MED LIST DOCD IN RCRD: CPT | Mod: CPTII,S$GLB,, | Performed by: STUDENT IN AN ORGANIZED HEALTH CARE EDUCATION/TRAINING PROGRAM

## 2022-08-30 PROCEDURE — 99999 PR PBB SHADOW E&M-EST. PATIENT-LVL III: ICD-10-PCS | Mod: PBBFAC,,, | Performed by: STUDENT IN AN ORGANIZED HEALTH CARE EDUCATION/TRAINING PROGRAM

## 2022-08-30 PROCEDURE — 3044F PR MOST RECENT HEMOGLOBIN A1C LEVEL <7.0%: ICD-10-PCS | Mod: CPTII,S$GLB,, | Performed by: STUDENT IN AN ORGANIZED HEALTH CARE EDUCATION/TRAINING PROGRAM

## 2022-08-30 NOTE — PROGRESS NOTES
Section of Cardiology                  Cardiac Clinic Note    Chief Complaint/Reason for consultation:  Chest pain      HPI:   Alma Vivar is a 56 y.o. female with h/o no significant PMH who was referred to cardiology clinic by ADELAIDA Romo for evaluation.      7/26/2022  Has been seen in the ED for chest pain/shortness of breath symptoms x2, negative workup  First episode of chest pain was in [place], about 3 weeks ago, had a pulling sensation in her chest with SOB (can't take deep breaths)  Got poison ivy (allergic) and had to take prednisone (symptoms started when taking prednisone) for 2 weeks   Other symptoms occur with waking up   Get LH at time and palpitations also  Symptoms last hours   Symptoms don't worsen with activity  Exercises regularly, but has stopped due to symptoms, at least 4 days a week   She is an E[employer] speciality, travels for work and family    Active at home and generally     denies tobacco abuse. ETOH occ  Family hx: HTN, cancer     Denies syncope.  Denies DM, HTN, CVA      8/30/22  ETT 7/22 abnormal   Nuclear stress test 8/22 without ischemia- normal exercise capacity  Echocardiogram with normal EF  Blood pressure on a nuclear stress at rest 142/80  Has not had any chest pain  BP normal today   Would like to start exercising again- was holding off until results came back  Being worked up for sleep apnea     Denies SOB, syncope, orthopnea      EKG 7/20/2022 NSR, no acute ST - T wave changes    ECHO  7/22  The left ventricle is normal in size with normal systolic function.  The estimated ejection fraction is 65%.  Normal left ventricular diastolic function.  Normal right ventricular size with normal right ventricular systolic function.  Mild tricuspid regurgitation.  Normal central venous pressure (3 mmHg).  The estimated PA systolic pressure is 21 mmHg.    STRESS TEST    [facility]      ROS: All 10 systems reviewed. Please refer to the HPI for pertinent positives. All other  systems negative.     Past Medical History  Past Medical History:   Diagnosis Date    Abnormal glandular Papanicolaou smear of cervix 2018 10:09:15 AM    [facility]: Abnormal PAP Smear-No Additional Notes    Abnormal glandular Papanicolaou smear of cervix 2018 10:09:15 AM    [facility]: Abnormal PAP Smear-No Additional Notes    Hormone replacement therapy (HRT)     Human papillomavirus in conditions classified elsewhere and of unspecified site 10/6/2020 8:36:36 AM    [facility] - [facility]: HPV in female-No Additional Notes    Human papillomavirus in conditions classified elsewhere and of unspecified site 10/6/2020 8:36:36 AM    [facility] - [facility]: HPV in female-No Additional Notes    Hyperlipidemia     Other specified noninflammatory disorder of vagina 2018 9:52:03 AM    [facility] - [facility]: Vaginal lesion-No Additional Notes    Other specified noninflammatory disorder of vagina 2018 9:52:03 AM    [facility] - [facility]: Vaginal Lesion-No Additional Notes       Surgical History  Past Surgical History:   Procedure Laterality Date     SECTION      HYSTERECTOMY            Allergies:   Review of patient's allergies indicates:   Allergen Reactions    Tomato (solanum lycopersicum) Anaphylaxis    Cpd vehicle sol.sugarfree no.1      Agave    Shellfish containing products Itching    Sulfites     Tomato     Sulfa (sulfonamide antibiotics) Rash       Social History:  Social History     Socioeconomic History    Marital status:      Spouse name: Roger    Number of children: 2   Occupational History     Comment: .    Tobacco Use    Smoking status: Never    Smokeless tobacco: Never   Substance and Sexual Activity    Alcohol use: Yes     Alcohol/week: 1.0 standard drink     Types: 1 Glasses of wine per week     Comment: wine    Drug use: No    Sexual activity: Not Currently      Social Determinants of Health     Financial Resource Strain: Unknown    Difficulty of Paying Living Expenses: Patient refused   Food Insecurity: Unknown    Worried About Running Out of Food in the Last Year: Patient refused    Ran Out of Food in the Last Year: Patient refused   Transportation Needs: Unknown    Lack of Transportation (Medical): Patient refused    Lack of Transportation (Non-Medical): Patient refused   Physical Activity: Unknown    Days of Exercise per Week: Patient refused   Stress: Unknown    Feeling of Stress : Patient refused   Social Connections: Unknown    Frequency of Communication with Friends and Family: Patient refused    Frequency of Social Gatherings with Friends and Family: Patient refused    Active Member of Clubs or Organizations: Patient refused    Attends Club or Organization Meetings: Patient refused    Marital Status:    Housing Stability: Unknown    Unable to Pay for Housing in the Last Year: Patient refused    Number of Places Lived in the Last Year: 1    Unstable Housing in the Last Year: Patient refused       Family History:  family history includes Bipolar disorder in her father; Cancer in her maternal grandmother; Cirrhosis in her father; Depression in her paternal grandmother; Hypertension in her mother; Kidney failure in her father; Mental illness in her father; Ovarian cancer in her maternal grandmother.    Home Medications:  Current Outpatient Medications on File Prior to Visit   Medication Sig Dispense Refill    aspirin (ECOTRIN) 81 MG EC tablet Take 81 mg by mouth once daily.      fexofenadine (ALLEGRA) 180 MG tablet Take 180 mg by mouth once daily.      fluticasone (VERAMYST) 27.5 mcg/actuation nasal spray 2 sprays by Nasal route once daily. 15.8 mL 3     Current Facility-Administered Medications on File Prior to Visit   Medication Dose Route Frequency Provider Last Rate Last Admin    regadenoson injection 0.4 mg  0.4 mg Intravenous Once Letty Matthews  MD           Physical exam:  There were no vitals taken for this visit.        General: Pt is a 56 y.o. year old female who is AAOx3, in NAD, is pleasant, well nourished, looks stated age  HEENT: PERRL, EOMI, Oral mucosa pink & moist  CVS  No abnormal cardiac pulsations noted on inspection. JVP not raised. The apical impulse is normal on palpation, and is located in the left 5th intercostal space in the mid - clavicular line. No palpable thrills or abnormal pulsations noted. RR, S1 - S2 heard, no murmurs, rubs or gallops appreciated.   PUL : CTA B/L. No wheezes/crackles heard   ABD : BS +, soft. No tenderness elicited   LE : No C/C/E. Distal Pulses palpable B/L         LABS:    Chemistry:   Lab Results   Component Value Date     07/20/2022    K 5.0 07/20/2022     07/20/2022    CO2 23 07/20/2022    BUN 17 07/20/2022    CREATININE 0.9 07/20/2022    CALCIUM 9.2 07/20/2022     Cardiac Markers:   Lab Results   Component Value Date    TROPONINI <0.006 07/20/2022     Cardiac Markers (Last 3):   Lab Results   Component Value Date    TROPONINI <0.006 07/20/2022     CBC:   Lab Results   Component Value Date    WBC 10.40 07/20/2022    HGB 14.3 07/20/2022    HCT 43.2 07/20/2022    MCV 90 07/20/2022     07/20/2022     Lipids:   Lab Results   Component Value Date    CHOL 255 (H) 07/19/2022    TRIG 76 07/19/2022    HDL 73 07/19/2022    HDL 51 08/07/2015     Coagulation: No results found for: PT, INR, APTT        Assessment        1. Abnormal ECG during exercise stress test    2. Chest pain, unspecified type    3. Elevated LDL cholesterol level    4. Elevated blood pressure reading           Plan:     Chest pain- resolved  ETT 7/22 abnormal   Nuclear stress test 8/22 without ischemia  Echocardiogram with normal EF    Elevated blood pressure reading  No history of hypertension  Stable today  Check BP at home, bring log to next visit     Elevated LDL level  Reports diet and exercise, does not eat much protein  10  year ASCVD risk 2.3%  Recheck in 6 months (1/23)    This note was prepared using voice recognition system and is likely to have sound alike errors that may have been overlooked even after proofreading.     I have reviewed all pertinent chart information.  Plans and recommendations have been formulated under my direct supervision. All questions answered and patient voiced understanding.   If symptoms persist go to the ED.    RTC in 3 months        Letty Matthews MD  Cardiology

## 2022-09-23 ENCOUNTER — OFFICE VISIT (OUTPATIENT)
Dept: PULMONOLOGY | Facility: CLINIC | Age: 57
End: 2022-09-23
Payer: COMMERCIAL

## 2022-09-23 VITALS
OXYGEN SATURATION: 99 % | BODY MASS INDEX: 26.55 KG/M2 | WEIGHT: 140.63 LBS | RESPIRATION RATE: 20 BRPM | DIASTOLIC BLOOD PRESSURE: 78 MMHG | HEART RATE: 87 BPM | SYSTOLIC BLOOD PRESSURE: 128 MMHG | HEIGHT: 61 IN

## 2022-09-23 DIAGNOSIS — J30.89 NON-SEASONAL ALLERGIC RHINITIS DUE TO OTHER ALLERGIC TRIGGER: ICD-10-CM

## 2022-09-23 DIAGNOSIS — G47.33 OBSTRUCTIVE SLEEP APNEA: Primary | ICD-10-CM

## 2022-09-23 PROCEDURE — 1160F RVW MEDS BY RX/DR IN RCRD: CPT | Mod: CPTII,S$GLB,, | Performed by: NURSE PRACTITIONER

## 2022-09-23 PROCEDURE — 3078F PR MOST RECENT DIASTOLIC BLOOD PRESSURE < 80 MM HG: ICD-10-PCS | Mod: CPTII,S$GLB,, | Performed by: NURSE PRACTITIONER

## 2022-09-23 PROCEDURE — 3074F PR MOST RECENT SYSTOLIC BLOOD PRESSURE < 130 MM HG: ICD-10-PCS | Mod: CPTII,S$GLB,, | Performed by: NURSE PRACTITIONER

## 2022-09-23 PROCEDURE — 99999 PR PBB SHADOW E&M-EST. PATIENT-LVL IV: ICD-10-PCS | Mod: PBBFAC,,, | Performed by: NURSE PRACTITIONER

## 2022-09-23 PROCEDURE — 3044F PR MOST RECENT HEMOGLOBIN A1C LEVEL <7.0%: ICD-10-PCS | Mod: CPTII,S$GLB,, | Performed by: NURSE PRACTITIONER

## 2022-09-23 PROCEDURE — 3044F HG A1C LEVEL LT 7.0%: CPT | Mod: CPTII,S$GLB,, | Performed by: NURSE PRACTITIONER

## 2022-09-23 PROCEDURE — 3008F PR BODY MASS INDEX (BMI) DOCUMENTED: ICD-10-PCS | Mod: CPTII,S$GLB,, | Performed by: NURSE PRACTITIONER

## 2022-09-23 PROCEDURE — 3008F BODY MASS INDEX DOCD: CPT | Mod: CPTII,S$GLB,, | Performed by: NURSE PRACTITIONER

## 2022-09-23 PROCEDURE — 99203 OFFICE O/P NEW LOW 30 MIN: CPT | Mod: S$GLB,,, | Performed by: NURSE PRACTITIONER

## 2022-09-23 PROCEDURE — 99999 PR PBB SHADOW E&M-EST. PATIENT-LVL IV: CPT | Mod: PBBFAC,,, | Performed by: NURSE PRACTITIONER

## 2022-09-23 PROCEDURE — 3074F SYST BP LT 130 MM HG: CPT | Mod: CPTII,S$GLB,, | Performed by: NURSE PRACTITIONER

## 2022-09-23 PROCEDURE — 3078F DIAST BP <80 MM HG: CPT | Mod: CPTII,S$GLB,, | Performed by: NURSE PRACTITIONER

## 2022-09-23 PROCEDURE — 1159F MED LIST DOCD IN RCRD: CPT | Mod: CPTII,S$GLB,, | Performed by: NURSE PRACTITIONER

## 2022-09-23 PROCEDURE — 1160F PR REVIEW ALL MEDS BY PRESCRIBER/CLIN PHARMACIST DOCUMENTED: ICD-10-PCS | Mod: CPTII,S$GLB,, | Performed by: NURSE PRACTITIONER

## 2022-09-23 PROCEDURE — 99203 PR OFFICE/OUTPT VISIT, NEW, LEVL III, 30-44 MIN: ICD-10-PCS | Mod: S$GLB,,, | Performed by: NURSE PRACTITIONER

## 2022-09-23 PROCEDURE — 1159F PR MEDICATION LIST DOCUMENTED IN MEDICAL RECORD: ICD-10-PCS | Mod: CPTII,S$GLB,, | Performed by: NURSE PRACTITIONER

## 2022-09-23 RX ORDER — TRIAMCINOLONE ACETONIDE 1 MG/G
OINTMENT TOPICAL
COMMUNITY
Start: 2022-07-09 | End: 2022-09-27

## 2022-09-23 NOTE — PROGRESS NOTES
Subjective:      Patient ID: Alma Vivar is a 56 y.o. female.    Patient Active Problem List   Diagnosis    Left hand weakness    Swelling of left hand    Obstructive sleep apnea    Allergic rhinitis       she has been referred by Candis Romo PA-C for evaluation and management for   Chief Complaint   Patient presents with    review sleep study    Sleep Apnea       Chief Complaint: review sleep study and Sleep Apnea      HPI:  She presents for COLTEN with review Home Sleep Study ordered by primary care provider, Dr. KILLIAN Wilkins after patient report of feeling tired and sleepy during day, frequent awakening, loud snoring, witnessed apnea by .     8/14/2022 Home Sleep Study  1 night study MILD/BORDERLINE OBSTRUCTIVE SLEEP APNEA with overall AHI 9.3/hr ( 69 events): night #1. Oxygen desaturation: < 70 %. SpO2 between 90% to 94% for 1 hr 14 min.    9/23/2022 orders Auto CPAP 5-20 cm with requested Nasal mask.     Bed time is 0930 - 1000  Wake time is 0600  Sleep onset is within 30 Minutes.  Sleep maintenance difficulties related to early morning awakening, frequent night time awakening, difficulty falling asleep, and non-restful sleep  Wake after sleep onset occurs more than two to six times a night.  Nocturia occurs one time a night,   Sleep aids :  NO  Dry mouth :  NO  Sleep walking:  NO  Sleep talking :  NO  Sleep eating: NO  Vivid Dreams :  NO  Cataplexy :  NO    [place] Sleepiness Scale   EPWORTH SLEEPINESS SCALE 9/23/2022   Sitting and reading 0   Watching TV 1   Sitting, inactive in a public place (e.g. a theatre or a meeting) 0   As a passenger in a car for an hour without a break 3   Lying down to rest in the afternoon when circumstances permit 3   Sitting and talking to someone 0   Sitting quietly after a lunch without alcohol 0   In a car, while stopped for a few minutes in traffic 0   Total score 7       Neck circumference is 35.5 cm. (14 inches).  Mallampati score 4    Previous Report  Reviewed: lab reports and office notes     Past Medical History: The following portions of the patient's history were reviewed and updated as appropriate:   She  has a past surgical history that includes Hysterectomy and  section.  Her family history includes Bipolar disorder in her father; Cancer in her maternal grandmother; Cirrhosis in her father; Depression in her paternal grandmother; Hypertension in her mother; Kidney failure in her father; Mental illness in her father; Ovarian cancer in her maternal grandmother.  She  reports that she has never smoked. She has never used smokeless tobacco. She reports current alcohol use of about 1.0 standard drink per week. She reports that she does not use drugs.  She has a current medication list which includes the following prescription(s): aspirin, fexofenadine, fluticasone, and triamcinolone acetonide 0.1%, and the following Facility-Administered Medications: regadenoson.  She is allergic to tomato (solanum lycopersicum), cpd vehicle sol.sugarfree no.1, shellfish containing products, sulfites, tomato, and sulfa (sulfonamide antibiotics)..    Review of Systems   Constitutional:  Negative for fever, chills, weight loss, weight gain, activity change, appetite change, fatigue and night sweats.   HENT:  Negative for postnasal drip, rhinorrhea, sinus pressure, voice change and congestion.    Eyes:  Negative for redness and itching.   Respiratory:  Positive for apnea and snoring. Negative for cough, sputum production, chest tightness, shortness of breath, wheezing, orthopnea, asthma nighttime symptoms, dyspnea on extertion, use of rescue inhaler and somnolence.    Cardiovascular: Negative.  Negative for chest pain, palpitations and leg swelling.   Genitourinary:  Negative for difficulty urinating and hematuria.   Endocrine:  Negative for cold intolerance and heat intolerance.    Musculoskeletal:  Negative for arthralgias, gait problem, joint swelling and myalgias.  "  Skin: Negative.    Gastrointestinal:  Negative for nausea, vomiting, abdominal pain and acid reflux.   Neurological:  Negative for dizziness, weakness, light-headedness and headaches.   Hematological:  Negative for adenopathy. No excessive bruising.   All other systems reviewed and are negative.   Objective:   /78   Pulse 87   Resp 20   Ht 5' 1" (1.549 m)   Wt 63.8 kg (140 lb 10.5 oz)   SpO2 99%   BMI 26.58 kg/m²   Physical Exam  Vitals and nursing note reviewed.   Constitutional:       General: She is not in acute distress.     Appearance: Normal appearance. She is well-developed. She is not ill-appearing or toxic-appearing.   HENT:      Head: Normocephalic.      Right Ear: External ear normal.      Left Ear: External ear normal.      Nose: Nose normal.      Mouth/Throat:      Pharynx: No oropharyngeal exudate.   Eyes:      Conjunctiva/sclera: Conjunctivae normal.   Cardiovascular:      Rate and Rhythm: Normal rate and regular rhythm.      Heart sounds: Normal heart sounds.   Pulmonary:      Effort: Pulmonary effort is normal.      Breath sounds: Normal breath sounds. No stridor.   Abdominal:      Palpations: Abdomen is soft.   Musculoskeletal:         General: Normal range of motion.      Cervical back: Normal range of motion and neck supple.   Lymphadenopathy:      Cervical: No cervical adenopathy.   Skin:     General: Skin is warm and dry.   Neurological:      Mental Status: She is alert and oriented to person, place, and time.   Psychiatric:         Behavior: Behavior normal. Behavior is cooperative.         Thought Content: Thought content normal.         Judgment: Judgment normal.       Personal Diagnostic Review  Review of labs, xray's, cardiology reports.     Assessment:     1. Obstructive sleep apnea    2. Non-seasonal allergic rhinitis due to other allergic trigger      Orders Placed This Encounter   Procedures    CPAP FOR HOME USE     8/14/2022 Home Sleep Study    1 night " study  MILD/BORDERLINE OBSTRUCTIVE SLEEP APNEA with overall AHI 9.3/hr ( 69 events): night #1  Oxygen desaturation: < 70 %. SpO2 between 90% to 94% for 1 hr 14 min.  Patient snored 94% time above 50 .  Heart rate range: 59 bpm - 111 bpm     Order Specific Question:   Length of need (1-99 months):     Answer:   99     Order Specific Question:   Type ():     Answer:   Auto CPAP     Order Specific Question:   Auto CPAP pressure setting range (cmH20):     Answer:   5-20     Order Specific Question:   Fulfillment Priority:     Answer:   Level 4:  all others     Order Specific Question:   Humidification ():     Answer:   Heated     Order Specific Question:   Choose ONE mask type and its corresponding cushions and/or pillows:     Answer:    Nasal Mask, 1 per 90 days:  Nasal Cushions, (6 per 90 days):  Nasal Pillows, (6 per 90 days)     Comments:   or mask of choice     Order Specific Question:   Choose EITHER Heated or Non-Heated Tubjing     Answer:    Non-Heated Tubing, 1 per 90 days     Order Specific Question:   Number of Days Needed:     Answer:   99     Order Specific Question:   All other supplies as needed as listed below:     Answer:    Headgear, 1 per 180 days     Order Specific Question:   All other supplies as needed as listed below:     Answer:    Chin Strap, 1 per 180 days     Order Specific Question:   All other supplies as needed as listed below:     Answer:    Disposable Filter, 6 per 90 days     Order Specific Question:   All other supplies as needed as listed below:     Answer:    Non-Disposable Filter, 1 per 180 days     Order Specific Question:   All other supplies as needed as listed below:     Answer:    Humidifier Chamber, 1 per 180 days     Plan:   Discussed diagnosis, its evaluation, treatment and usual course. All questions answered.  Problem List Items Addressed This Visit       Obstructive sleep apnea - Primary     8/14/2022 Home Sleep  Study  1 night study MILD/BORDERLINE OBSTRUCTIVE SLEEP APNEA with overall AHI 9.3/hr ( 69 events): night #1. Oxygen desaturation: < 70 %. SpO2 between 90% to 94% for 1 hr 14 min.  9/23/2022 orders Auto CPAP 5-20 cm  Nasal mask  [facility]              Relevant Orders    CPAP FOR HOME USE    Allergic rhinitis     Controlled on allegra and fluticasone.             Follow up in about 10 weeks (around 12/2/2022) for CPAP compliance download after initial set up.    Thank you for the opportunity to participate in the care of this patient.

## 2022-09-23 NOTE — ASSESSMENT & PLAN NOTE
8/14/2022 Home Sleep Study  1 night study MILD/BORDERLINE OBSTRUCTIVE SLEEP APNEA with overall AHI 9.3/hr ( 69 events): night #1. Oxygen desaturation: < 70 %. SpO2 between 90% to 94% for 1 hr 14 min.  9/23/2022 orders Auto CPAP 5-20 cm  Nasal mask  [facility]

## 2022-09-27 ENCOUNTER — OFFICE VISIT (OUTPATIENT)
Dept: ALLERGY | Facility: CLINIC | Age: 57
End: 2022-09-27
Payer: COMMERCIAL

## 2022-09-27 ENCOUNTER — LAB VISIT (OUTPATIENT)
Dept: LAB | Facility: HOSPITAL | Age: 57
End: 2022-09-27
Attending: FAMILY MEDICINE
Payer: COMMERCIAL

## 2022-09-27 VITALS
HEART RATE: 73 BPM | TEMPERATURE: 98 F | BODY MASS INDEX: 26.47 KG/M2 | DIASTOLIC BLOOD PRESSURE: 85 MMHG | HEIGHT: 61 IN | WEIGHT: 140.19 LBS | SYSTOLIC BLOOD PRESSURE: 131 MMHG

## 2022-09-27 DIAGNOSIS — J30.89 NON-SEASONAL ALLERGIC RHINITIS, UNSPECIFIED TRIGGER: ICD-10-CM

## 2022-09-27 DIAGNOSIS — Z91.018 FOOD ALLERGY: Primary | ICD-10-CM

## 2022-09-27 DIAGNOSIS — Z91.018 FOOD ALLERGY: ICD-10-CM

## 2022-09-27 DIAGNOSIS — Z91.013 ALLERGY TO SHELLFISH: ICD-10-CM

## 2022-09-27 LAB — IGE SERPL-ACNC: 68 IU/ML (ref 0–100)

## 2022-09-27 PROCEDURE — 3008F BODY MASS INDEX DOCD: CPT | Mod: CPTII,S$GLB,, | Performed by: PHYSICIAN ASSISTANT

## 2022-09-27 PROCEDURE — 85025 COMPLETE CBC W/AUTO DIFF WBC: CPT | Performed by: PHYSICIAN ASSISTANT

## 2022-09-27 PROCEDURE — 82785 ASSAY OF IGE: CPT | Performed by: PHYSICIAN ASSISTANT

## 2022-09-27 PROCEDURE — 86003 ALLG SPEC IGE CRUDE XTRC EA: CPT | Mod: 59 | Performed by: PHYSICIAN ASSISTANT

## 2022-09-27 PROCEDURE — 3079F PR MOST RECENT DIASTOLIC BLOOD PRESSURE 80-89 MM HG: ICD-10-PCS | Mod: CPTII,S$GLB,, | Performed by: PHYSICIAN ASSISTANT

## 2022-09-27 PROCEDURE — 3075F SYST BP GE 130 - 139MM HG: CPT | Mod: CPTII,S$GLB,, | Performed by: PHYSICIAN ASSISTANT

## 2022-09-27 PROCEDURE — 36415 COLL VENOUS BLD VENIPUNCTURE: CPT | Performed by: PHYSICIAN ASSISTANT

## 2022-09-27 PROCEDURE — 99204 OFFICE O/P NEW MOD 45 MIN: CPT | Mod: S$GLB,,, | Performed by: PHYSICIAN ASSISTANT

## 2022-09-27 PROCEDURE — 1159F PR MEDICATION LIST DOCUMENTED IN MEDICAL RECORD: ICD-10-PCS | Mod: CPTII,S$GLB,, | Performed by: PHYSICIAN ASSISTANT

## 2022-09-27 PROCEDURE — 99999 PR PBB SHADOW E&M-EST. PATIENT-LVL IV: ICD-10-PCS | Mod: PBBFAC,,, | Performed by: PHYSICIAN ASSISTANT

## 2022-09-27 PROCEDURE — 3075F PR MOST RECENT SYSTOLIC BLOOD PRESS GE 130-139MM HG: ICD-10-PCS | Mod: CPTII,S$GLB,, | Performed by: PHYSICIAN ASSISTANT

## 2022-09-27 PROCEDURE — 86003 ALLG SPEC IGE CRUDE XTRC EA: CPT | Performed by: PHYSICIAN ASSISTANT

## 2022-09-27 PROCEDURE — 99204 PR OFFICE/OUTPT VISIT, NEW, LEVL IV, 45-59 MIN: ICD-10-PCS | Mod: S$GLB,,, | Performed by: PHYSICIAN ASSISTANT

## 2022-09-27 PROCEDURE — 3008F PR BODY MASS INDEX (BMI) DOCUMENTED: ICD-10-PCS | Mod: CPTII,S$GLB,, | Performed by: PHYSICIAN ASSISTANT

## 2022-09-27 PROCEDURE — 3044F PR MOST RECENT HEMOGLOBIN A1C LEVEL <7.0%: ICD-10-PCS | Mod: CPTII,S$GLB,, | Performed by: PHYSICIAN ASSISTANT

## 2022-09-27 PROCEDURE — 99999 PR PBB SHADOW E&M-EST. PATIENT-LVL IV: CPT | Mod: PBBFAC,,, | Performed by: PHYSICIAN ASSISTANT

## 2022-09-27 PROCEDURE — 3079F DIAST BP 80-89 MM HG: CPT | Mod: CPTII,S$GLB,, | Performed by: PHYSICIAN ASSISTANT

## 2022-09-27 PROCEDURE — 1159F MED LIST DOCD IN RCRD: CPT | Mod: CPTII,S$GLB,, | Performed by: PHYSICIAN ASSISTANT

## 2022-09-27 PROCEDURE — 3044F HG A1C LEVEL LT 7.0%: CPT | Mod: CPTII,S$GLB,, | Performed by: PHYSICIAN ASSISTANT

## 2022-09-27 PROCEDURE — 86008 ALLG SPEC IGE RECOMB EA: CPT | Mod: 59 | Performed by: PHYSICIAN ASSISTANT

## 2022-09-27 RX ORDER — EPINEPHRINE 0.3 MG/.3ML
1 INJECTION SUBCUTANEOUS ONCE
Qty: 2 EACH | Refills: 2 | Status: SHIPPED | OUTPATIENT
Start: 2022-09-27 | End: 2023-06-06

## 2022-09-27 NOTE — PROGRESS NOTES
Subjective:   Patient: Alma Vivar [id number], :1965   Visit date:2022 11:28 AM    Chief Complaint:  Allergies    HPI:    Prior notes reviewed by myself.  Clinical documentation obtained by nursing staff reviewed.       HPI:     Moved to [place] 6 yrs ago and since has had several, reoccurring episodes of anaphylaxis. Was well controlled with strict avoidence of shrimp and tomato prior to moving to [place].   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 Tested 20 yrs ago. She has an epipen but is .   During episodes her fist ssx include itching, swelling of tongue, tachycardia and pt reported if she catches early enough and takes Benadryl sometimes this is sufficient to relieve symptoms. Sometimes escalates to throat closing and pt is seen in ED.  Believes she is now having episodes from other/all shellfish and possibly fish, green peppers, and wheat  Red wine- causes diffuse pruritis and erythema (face) - [place] jacqui only    No significant environmental ssx/allergies.     Environmental History:  Environmental Hx: Pets in the home: cats (1). No smoke exposure.       Past Medical History:   Diagnosis Date    Abnormal glandular Papanicolaou smear of cervix 2018 10:09:15 AM    [facility]: Abnormal PAP Smear-No Additional Notes    Abnormal glandular Papanicolaou smear of cervix 2018 10:09:15 AM    [facility]: Abnormal PAP Smear-No Additional Notes    Hormone replacement therapy (HRT)     Human papillomavirus in conditions classified elsewhere and of unspecified site 10/6/2020 8:36:36 AM    [facility] - [facility]: HPV in female-No Additional Notes    Human papillomavirus in conditions classified elsewhere and of unspecified site 10/6/2020 8:36:36 AM    [facility] - [facility]: HPV in female-No Additional Notes    Hyperlipidemia     Other specified noninflammatory disorder of vagina 2018  9:52:03 AM    [facility] - [facility]: Vaginal lesion-No Additional Notes    Other specified noninflammatory disorder of vagina 8/20/2018 9:52:03 AM    [facility] - [facility]: Vaginal Lesion-No Additional Notes        Family History   Problem Relation Age of Onset    Hypertension Mother     Bipolar disorder Father     Cirrhosis Father     Kidney failure Father     Mental illness Father     Ovarian cancer Maternal Grandmother     Cancer Maternal Grandmother     Depression Paternal Grandmother        Social History     Socioeconomic History    Marital status:      Spouse name: Roger    Number of children: 2   Occupational History     Comment: .    Tobacco Use    Smoking status: Never    Smokeless tobacco: Never   Substance and Sexual Activity    Alcohol use: Yes     Alcohol/week: 1.0 standard drink     Types: 1 Glasses of wine per week     Comment: wine    Drug use: No    Sexual activity: Not Currently     Social Determinants of Health     Financial Resource Strain: Unknown    Difficulty of Paying Living Expenses: Patient refused   Food Insecurity: Unknown    Worried About Running Out of Food in the Last Year: Patient refused    Ran Out of Food in the Last Year: Patient refused   Transportation Needs: Unknown    Lack of Transportation (Medical): Patient refused    Lack of Transportation (Non-Medical): Patient refused   Physical Activity: Unknown    Days of Exercise per Week: Patient refused   Stress: Unknown    Feeling of Stress : Patient refused   Social Connections: Unknown    Frequency of Communication with Friends and Family: Patient refused    Frequency of Social Gatherings with Friends and Family: Patient refused    Active Member of Clubs or Organizations: Patient refused    Attends Club or Organization Meetings: Patient refused    Marital Status:    Housing Stability: Unknown    Unable to Pay for Housing in the Last Year: Patient refused    Number of  "Places Lived in the Last Year: 1    Unstable Housing in the Last Year: Patient refused       Review of patient's allergies indicates:   Allergen Reactions    Tomato (solanum lycopersicum) Anaphylaxis    Cpd vehicle sol.sugarfree no.1      Agave    Shellfish containing products Itching    Sulfites     Tomato     Sulfa (sulfonamide antibiotics) Rash           Medication List with Changes/Refills   New Medications    EPINEPHRINE (EPIPEN) 0.3 MG/0.3 ML ATIN    Inject 0.3 mLs (0.3 mg total) into the muscle once. If symptoms not improved repeat in about 5-15 minutes - inject a second dose (pen) of 0.3 mL into muscle once. for 1 dose   Current Medications    ASPIRIN (ECOTRIN) 81 MG EC TABLET    Take 81 mg by mouth once daily.    FEXOFENADINE (ALLEGRA) 180 MG TABLET    Take 180 mg by mouth once daily.    FLUTICASONE (VERAMYST) 27.5 MCG/ACTUATION NASAL SPRAY    2 sprays by Nasal route once daily.   Discontinued Medications    TRIAMCINOLONE ACETONIDE 0.1% (KENALOG) 0.1 % OINTMENT    APPLY TO AFFECTED AREA TWICE A DAY FOR 14 DAYS         Objective:     Physical Exam:  Vitals:  /85 (BP Location: Left arm, Patient Position: Sitting)   Pulse 73   Temp 98.1 °F (36.7 °C) (Temporal)   Ht 5' 1" (1.549 m)   Wt 63.6 kg (140 lb 3.4 oz)   BMI 26.49 kg/m²   Constitutional:       General: No acute distress. Alert, well developed.   HENT:      Head: Normocephalic, no lesions.      Right Ear: Pinna and external ear appears normal.      Left Ear: Pinna and external ear appears normal.      Nose: No mass or lesion. Clear mucus. BIT's WNL.     Mouth/Throat: No oropharyngeal exudate or posterior oropharyngeal erythema.   Eyes:      General: No scleral icterus.Sclera white, extraocular movements intact.        Right eye: No discharge.         Left eye: No discharge.   Neck: Supple, no palpable nodes, no masses, trachea midline, no thyromegaly.   Cardiovascular:      Rate and Rhythm: Normal rate and regular rhythm.      Heart sounds: " Normal heart sounds. No murmur heard.   Pulmonary:      Effort: Pulmonary effort is normal. No respiratory distress.   Musculoskeletal:         General: No swelling, tenderness, deformity or signs of injury.    Skin:     General: Skin is warm.      Coloration: Skin is not jaundiced or pale.      Findings: No bruising, erythema, or rash.   Neurological:      Alert. Moves all extremities spontaneously  Psychiatric:         Mood is normal. Behavior is normal.              Assessment & Plan:   Food allergy  -     Clams IgE; Future; Expected date: 09/27/2022  -     Crab IgE; Future; Expected date: 09/27/2022  -     Crayfish, freshwater IgE; Future; Expected date: 09/27/2022  -     Lobster IgE; Future; Expected date: 09/27/2022  -     Oyster IgE; Future; Expected date: 09/27/2022  -     Shrimp IgE; Future; Expected date: 09/27/2022  -     Allergen, Peanut Components IGE; Future; Expected date: 09/27/2022  -     Allergen, Milk Components IGE; Future; Expected date: 09/27/2022  -     Allergen, Egg Components IGE; Future; Expected date: 09/27/2022  -     Wheat IgE; Future; Expected date: 09/27/2022  -     Soybean IgE; Future; Expected date: 09/27/2022  -     Almonds IgE; Future; Expected date: 09/27/2022  -     [place] IgE; Future; Expected date: 09/27/2022  -     Sesame Seed IgE; Future; Expected date: 09/27/2022  -     Sunflower, seed IgE; Future; Expected date: 09/27/2022  -     Pecan Nut IgE; Future; Expected date: 09/27/2022  -     Allergen-Codfish; Future; Expected date: 09/27/2022  -     Tuna IgE; Future; Expected date: 09/27/2022  -     Allergen-Catfish; Future; Expected date: 09/27/2022  -     Tomato IgE; Future; Expected date: 09/27/2022  -     Allergen-Green Pepper; Future; Expected date: 09/27/2022    Allergy to shellfish  Comments:  also with reaction, possibly to agave - desiring further testing   Orders:  -     Ambulatory referral/consult to Allergy    Non-seasonal allergic rhinitis, unspecified trigger  -      CBC Auto Differential; Future; Expected date: 09/27/2022  -     IgE; Future; Expected date: 09/27/2022  -     RAST Allergen Maple ([place]); Future; Expected date: 09/27/2022  -     RAST Allergen for [place]; Future; Expected date: 09/27/2022  -     Allergen, White Noah; Future; Expected date: 09/27/2022  -     Allergen, Meadow Grass ([place]); Future; Expected date: 09/27/2022  -     Allergen-Silver Birch; Future; Expected date: 09/27/2022  -     RAST Allergen Empire; Future; Expected date: 09/27/2022  -     RAST Allergen, Sheep [place]); Future; Expected date: 09/27/2022  -     Allergen-Alternaria Alternata; Future; Expected date: 09/27/2022  -     Allergen-Maple [place]; Future; Expected date: 09/27/2022  -     Allergen, Hackberry Celtis; Future; Expected date: 09/27/2022  -     Allergen, Elm Cedar; Future; Expected date: 09/27/2022  -     Allergen-S[place]ent; Future; Expected date: 09/27/2022  -     Allergen, Pecan Tree IgE; Future; Expected date: 09/27/2022  -     Lima, black IgE; Future; Expected date: 09/27/2022  -     Holton, bald IgE; Future; Expected date: 09/27/2022  -     Oak, white IgE; Future; Expected date: 09/27/2022  -     Allergen, Cocklebur; Future; Expected date: 09/27/2022  -     Cat epithelium IgE; Future; Expected date: 09/27/2022  -     Dog dander IgE; Future; Expected date: 09/27/2022  -     Bah[place] grass IgE; Future; Expected date: 09/27/2022  -     Chaetomium globosum IgE; Future; Expected date: 09/27/2022  -     Cockroach, American IgE; Future; Expected date: 09/27/2022  -     Cladosporium IgE; Future; Expected date: 09/27/2022  -     Curvularia lunata IgE; Future; Expected date: 09/27/2022  -     D. farinae IgE; Future; Expected date: 09/27/2022  -     D. pteronyssinus IgE; Future; Expected date: 09/27/2022  -     Plantain, English IgE; Future; Expected date: 09/27/2022  -     Eucalyptus IgE; Future; Expected date: 09/27/2022  -     oliver Bradley  IgE; Future; Expected date: 09/27/2022  -     Mugwort IgE; Future; Expected date: 09/27/2022  -     Nettle IgE; Future; Expected date: 09/27/2022  -     Orchard grass IgE; Future; Expected date: 09/27/2022  -     [place], western white IgE; Future; Expected date: 09/27/2022  -     Privet, common IgE; Future; Expected date: 09/27/2022  -     Ragweed, short, common IgE; Future; Expected date: 09/27/2022  -     Red top grass IgE; Future; Expected date: 09/27/2022  -     Rye grass, cultivated IgE; Future; Expected date: 09/27/2022  -     Thistle, Russian IgE; Future; Expected date: 09/27/2022  -     Stemphyllium IgE; Future; Expected date: 09/27/2022  -     Forrest IgE; Future; Expected date: 09/27/2022  -     Alcides grass IgE; Future; Expected date: 09/27/2022  -     Penicillium IgE; Future; Expected date: 09/27/2022  -     Aspergillus fumagatus IgE; Future; Expected date: 09/27/2022  -     Setomalanomma rostrata IgE; Future; Expected date: 09/27/2022  -     Bermuda grass IgE; Future; Expected date: 09/27/2022  -     Allergen-Common Pigweed; Future; Expected date: 09/27/2022  -     Allergen, E.Purpurascens; Future; Expected date: 09/27/2022  -     RAST Allergen Candida albicans; Future; Expected date: 09/27/2022    Other orders  -     EPINEPHrine (EPIPEN) 0.3 mg/0.3 mL AtIn; Inject 0.3 mLs (0.3 mg total) into the muscle once. If symptoms not improved repeat in about 5-15 minutes - inject a second dose (pen) of 0.3 mL into muscle once. for 1 dose  Dispense: 2 each; Refill: 2    Food allergy testing, added inhalants for possible pollen cross. Will contact pt with results and further recommendations.   Refilled Epipen x 2     Thank you for allowing me to participate in the care of Alma.        Holly Carcamo PA-C  [facility]  [facility]  [address]  [place] [address]  P: [phone]  F: [phone]

## 2022-09-28 LAB
BASOPHILS # BLD AUTO: 0.03 K/UL (ref 0–0.2)
BASOPHILS NFR BLD: 0.5 % (ref 0–1.9)
DIFFERENTIAL METHOD: NORMAL
EOSINOPHIL # BLD AUTO: 0.1 K/UL (ref 0–0.5)
EOSINOPHIL NFR BLD: 2.5 % (ref 0–8)
ERYTHROCYTE [DISTWIDTH] IN BLOOD BY AUTOMATED COUNT: 12.6 % (ref 11.5–14.5)
HCT VFR BLD AUTO: 41.6 % (ref 37–48.5)
HGB BLD-MCNC: 13.4 G/DL (ref 12–16)
IMM GRANULOCYTES # BLD AUTO: 0.01 K/UL (ref 0–0.04)
IMM GRANULOCYTES NFR BLD AUTO: 0.2 % (ref 0–0.5)
LYMPHOCYTES # BLD AUTO: 1.8 K/UL (ref 1–4.8)
LYMPHOCYTES NFR BLD: 32.4 % (ref 18–48)
MCH RBC QN AUTO: 30.3 PG (ref 27–31)
MCHC RBC AUTO-ENTMCNC: 32.2 G/DL (ref 32–36)
MCV RBC AUTO: 94 FL (ref 82–98)
MONOCYTES # BLD AUTO: 0.6 K/UL (ref 0.3–1)
MONOCYTES NFR BLD: 10.5 % (ref 4–15)
NEUTROPHILS # BLD AUTO: 3 K/UL (ref 1.8–7.7)
NEUTROPHILS NFR BLD: 53.9 % (ref 38–73)
NRBC BLD-RTO: 0 /100 WBC
PLATELET # BLD AUTO: 265 K/UL (ref 150–450)
PMV BLD AUTO: 11.2 FL (ref 9.2–12.9)
RBC # BLD AUTO: 4.42 M/UL (ref 4–5.4)
WBC # BLD AUTO: 5.61 K/UL (ref 3.9–12.7)

## 2022-09-30 LAB
A ALTERNATA IGE QN: <0.1 KU/L
A FUMIGATUS IGE QN: <0.1 KU/L
A-LACTALB IGE QN: <0.1 KU/L
ALLERGEN BOXELDER MAPLE TREE IGE: <0.1 KU/L
ALLERGEN CHAETOMIUM GLOBOSUM IGE: <0.1 KU/L
ALLERGEN MAPLE (BOX ELDER) CLASS: NORMAL
ALLERGEN MULBERRY CLASS: NORMAL
ALLERGEN MULBERRY TREE IGE: <0.1 KU/L
ALLERGEN PIGWEED IGE: <0.1 KU/L
ALLERGEN WHITE ASH TREE IGE: <0.1 KU/L
ALLERGEN WHITE PINE TREE IGE: <0.1 KU/L
ALLERGY INTERPRETATION: NORMAL
ALMOND IGE QN: <0.1 KU/L
AMER SYCAMORE IGE QN: <0.35 KU/L
B-LACTALB IGE QN: <0.1 KU/L
BAHIA GRASS IGE QN: 2.11 KU/L
BALD CYPRESS IGE QN: <0.1 KU/L
BERMUDA GRASS IGE QN: <0.1 KU/L
C ALBICANS IGE QN: <0.1 KU/L
C HERBARUM IGE QN: <0.1 KU/L
C LUNATA IGE QN: <0.1 KU/L
CASEIN IGE QN: <0.1 KU/L
CAT DANDER IGE QN: <0.1 KU/L
CATFISH IGE QN: <0.1 KU/L
CHAETOMIUM GLOB. CLASS: NORMAL
CLAM IGE QN: <0.1 KU/L
COCKLEBUR IGE QN: <0.1 KU/L
COCKSFOOT IGE QN: 3.21 KU/L
CODFISH IGE QN: <0.1 KU/L
COMMON PIGWEED CLASS: NORMAL
COMMON RAGWEED IGE QN: 0.99 KU/L
COTTONWOOD IGE QN: <0.1 KU/L
COW MILK IGE QN: <0.1 KU/L
CRAB IGE QN: <0.1 KU/L
CRAWFISH IGE QN: <0.1 KU/L
D FARINAE IGE QN: <0.1 KU/L
D PTERONYSS IGE QN: <0.1 KU/L
DEPRECATED A ALTERNATA IGE RAST QL: NORMAL
DEPRECATED A FUMIGATUS IGE RAST QL: NORMAL
DEPRECATED A-LACTALB IGE RAST QL: NORMAL
DEPRECATED ALMOND IGE RAST QL: NORMAL
DEPRECATED B-LACTALB IGE RAST QL: NORMAL
DEPRECATED BAHIA GRASS IGE RAST QL: ABNORMAL
DEPRECATED BALD CYPRESS IGE RAST QL: NORMAL
DEPRECATED BERMUDA GRASS IGE RAST QL: NORMAL
DEPRECATED C ALBICANS IGE RAST QL: NORMAL
DEPRECATED C HERBARUM IGE RAST QL: NORMAL
DEPRECATED C LUNATA IGE RAST QL: NORMAL
DEPRECATED CASEIN IGE RAST QL: NORMAL
DEPRECATED CAT DANDER IGE RAST QL: NORMAL
DEPRECATED CATFISH IGE RAST QL: NORMAL
DEPRECATED CLAM IGE RAST QL: NORMAL
DEPRECATED COCKLEBUR IGE RAST QL: NORMAL
DEPRECATED COCKSFOOT IGE RAST QL: ABNORMAL
DEPRECATED CODFISH IGE RAST QL: NORMAL
DEPRECATED COMMON RAGWEED IGE RAST QL: ABNORMAL
DEPRECATED COTTONWOOD IGE RAST QL: NORMAL
DEPRECATED COW MILK IGE RAST QL: NORMAL
DEPRECATED CRAB IGE RAST QL: NORMAL
DEPRECATED CRAWFISH IGE RAST QL: NORMAL
DEPRECATED D FARINAE IGE RAST QL: NORMAL
DEPRECATED D PTERONYSS IGE RAST QL: NORMAL
DEPRECATED DOG DANDER IGE RAST QL: NORMAL
DEPRECATED EGG WHITE IGE RAST QL: NORMAL
DEPRECATED EGG YOLK IGE RAST QL: NORMAL
DEPRECATED ELDER IGE RAST QL: NORMAL
DEPRECATED ENGL PLANTAIN IGE RAST QL: NORMAL
DEPRECATED GREEN PEPPER IGE RAST QL: NORMAL
DEPRECATED GUM-TREE IGE RAST QL: NORMAL
DEPRECATED HACKBERRY TREE IGE RAST QL: NORMAL
DEPRECATED JOHNSON GRASS IGE RAST QL: ABNORMAL
DEPRECATED KENT BLUE GRASS IGE RAST QL: ABNORMAL
DEPRECATED LOBSTER IGE RAST QL: NORMAL
DEPRECATED LONDON PLANE IGE RAST QL: NORMAL
DEPRECATED MUGWORT IGE RAST QL: ABNORMAL
DEPRECATED NETTLE IGE RAST QL: NORMAL
DEPRECATED OVALB IGE RAST QL: NORMAL
DEPRECATED OVOMUCOID IGE RAST QL: NORMAL
DEPRECATED OYSTER IGE RAST QL: NORMAL
DEPRECATED P NOTATUM IGE RAST QL: NORMAL
DEPRECATED PEANUT (RARA H) 2 IGE RAST QL: NORMAL
DEPRECATED PEANUT (RARA H) 2 IGE RAST QL: NORMAL
DEPRECATED PEANUT (RARA H) 3 IGE RAST QL: NORMAL
DEPRECATED PEANUT (RARA H) 6 IGE RAST QL: NORMAL
DEPRECATED PEANUT (RARA H) 8 IGE RAST QL: NORMAL
DEPRECATED PECAN/HICK NUT IGE RAST QL: NORMAL
DEPRECATED PECAN/HICK TREE IGE RAST QL: NORMAL
DEPRECATED PER RYE GRASS IGE RAST QL: ABNORMAL
DEPRECATED PRIVET IGE RAST QL: NORMAL
DEPRECATED RED TOP GRASS IGE RAST QL: ABNORMAL
DEPRECATED ROACH IGE RAST QL: NORMAL
DEPRECATED S ROSTRATA IGE RAST QL: NORMAL
DEPRECATED SALTWORT IGE RAST QL: NORMAL
DEPRECATED SESAME SEED IGE RAST QL: NORMAL
DEPRECATED SHEEP SORREL IGE RAST QL: NORMAL
DEPRECATED SHRIMP IGE RAST QL: NORMAL
DEPRECATED SILVER BIRCH IGE RAST QL: NORMAL
DEPRECATED SOYBEAN IGE RAST QL: NORMAL
DEPRECATED SUNFLOWER SEED IGE RAST QL: NORMAL
DEPRECATED TIMOTHY IGE RAST QL: ABNORMAL
DEPRECATED TOMATO IGE RAST QL: NORMAL
DEPRECATED TUNA IGE RAST QL: NORMAL
DEPRECATED WALNUT IGE RAST QL: NORMAL
DEPRECATED WHEAT IGE RAST QL: NORMAL
DEPRECATED WHITE OAK IGE RAST QL: NORMAL
DEPRECATED WILLOW IGE RAST QL: NORMAL
DOG DANDER IGE QN: <0.1 KU/L
EGG WHITE IGE QN: <0.1 KU/L
EGG YOLK IGE QN: <0.1 KU/L
ELDER IGE QN: <0.1 KU/L
ELM CEDAR CLASS: NORMAL
ELM CEDAR, IGE: <0.1 KU/L
ENGL PLANTAIN IGE QN: <0.1 KU/L
EPICOCCUM PUPURASCENS CLASS: NORMAL
EPICOCCUM PURPURASCENS, IGE: <0.1 KU/L
GREEN PEPPER IGE QN: <0.1 KU/L
GUM-TREE IGE QN: <0.1 KU/L
HACKBERRY TREE IGE QN: <0.1 KU/L
JOHNSON GRASS IGE QN: 0.76 KU/L
KENT BLUE GRASS IGE QN: 3.14 KU/L
LOBSTER IGE QN: <0.1 KU/L
LONDON PLANE IGE QN: <0.1 KU/L
MUGWORT IGE QN: 0.79 KU/L
NETTLE IGE QN: <0.1 KU/L
OVALB IGE QN: <0.1 KU/L
OVOMUCOID IGE QN: <0.1 KU/L
OYSTER IGE QN: <0.1 KU/L
P NOTATUM IGE QN: <0.1 KU/L
PEANUT (RARA H) 1 IGE QN: <0.1 KU/L
PEANUT (RARA H) 2 IGE QN: <0.1 KU/L
PEANUT (RARA H) 3 IGE QN: <0.1 KU/L
PEANUT (RARA H) 6 IGE QN: <0.1 KU/L
PEANUT (RARA H) 8 IGE QN: <0.1 KU/L
PEANUT (RARA H) 9 IGE QN: <0.1 KU/L
PEANUT (RARA H) 9 IGE QN: NORMAL
PECAN/HICK NUT IGE QN: <0.1 KU/L
PECAN/HICK TREE IGE QN: <0.1 KU/L
PER RYE GRASS IGE QN: 3.04 KU/L
PRIVET IGE QN: <0.1 KU/L
RED TOP GRASS IGE QN: 2.62 KU/L
ROACH IGE QN: <0.1 KU/L
S ROSTRATA IGE QN: <0.1 KU/L
SALTWORT IGE QN: <0.1 KU/L
SESAME SEED IGE QN: <0.1 KU/L
SHEEP SORREL IGE QN: <0.1 KU/L
SHRIMP IGE QN: <0.1 KU/L
SILVER BIRCH IGE QN: <0.1 KU/L
SOYBEAN IGE QN: <0.1 KU/L
STEMPHYLIUM HERBARUM CLASS: NORMAL
STEMPHYLLIUM, IGE: <0.1 KU/L
SUNFLOWER SEED IGE QN: <0.1 KU/L
TIMOTHY IGE QN: 2.58 KU/L
TOMATO IGE QN: <0.1 KU/L
TUNA IGE QN: <0.1 KU/L
WALNUT IGE QN: <0.1 KU/L
WHEAT IGE QN: <0.1 KU/L
WHITE ASH CLASS: NORMAL
WHITE OAK IGE QN: <0.1 KU/L
WHITE PINE CLASS: NORMAL
WILLOW IGE QN: <0.1 KU/L

## 2022-10-04 ENCOUNTER — PATIENT MESSAGE (OUTPATIENT)
Dept: ALLERGY | Facility: CLINIC | Age: 57
End: 2022-10-04
Payer: COMMERCIAL

## 2022-10-10 ENCOUNTER — PATIENT MESSAGE (OUTPATIENT)
Dept: ALLERGY | Facility: CLINIC | Age: 57
End: 2022-10-10
Payer: COMMERCIAL

## 2022-11-18 ENCOUNTER — OFFICE VISIT (OUTPATIENT)
Dept: ALLERGY | Facility: CLINIC | Age: 57
End: 2022-11-18
Payer: COMMERCIAL

## 2022-11-18 VITALS
TEMPERATURE: 98 F | HEIGHT: 61 IN | WEIGHT: 142.19 LBS | DIASTOLIC BLOOD PRESSURE: 73 MMHG | BODY MASS INDEX: 26.85 KG/M2 | HEART RATE: 73 BPM | SYSTOLIC BLOOD PRESSURE: 188 MMHG

## 2022-11-18 DIAGNOSIS — J30.1 SEASONAL ALLERGIC RHINITIS DUE TO POLLEN: Primary | ICD-10-CM

## 2022-11-18 DIAGNOSIS — Z91.02 SULFITE ALLERGY: ICD-10-CM

## 2022-11-18 DIAGNOSIS — Z87.892 HISTORY OF ANAPHYLAXIS: ICD-10-CM

## 2022-11-18 DIAGNOSIS — Z91.018 FOOD ALLERGY: ICD-10-CM

## 2022-11-18 PROCEDURE — 1159F MED LIST DOCD IN RCRD: CPT | Mod: CPTII,S$GLB,, | Performed by: ALLERGY & IMMUNOLOGY

## 2022-11-18 PROCEDURE — 3008F PR BODY MASS INDEX (BMI) DOCUMENTED: ICD-10-PCS | Mod: CPTII,S$GLB,, | Performed by: ALLERGY & IMMUNOLOGY

## 2022-11-18 PROCEDURE — 99999 PR PBB SHADOW E&M-EST. PATIENT-LVL IV: ICD-10-PCS | Mod: PBBFAC,,, | Performed by: ALLERGY & IMMUNOLOGY

## 2022-11-18 PROCEDURE — 99214 OFFICE O/P EST MOD 30 MIN: CPT | Mod: 25,S$GLB,, | Performed by: ALLERGY & IMMUNOLOGY

## 2022-11-18 PROCEDURE — 95004 PR ALLERGY SKIN TESTS,ALLERGENS: ICD-10-PCS | Mod: S$GLB,,, | Performed by: ALLERGY & IMMUNOLOGY

## 2022-11-18 PROCEDURE — 3044F HG A1C LEVEL LT 7.0%: CPT | Mod: CPTII,S$GLB,, | Performed by: ALLERGY & IMMUNOLOGY

## 2022-11-18 PROCEDURE — 95004 PERQ TESTS W/ALRGNC XTRCS: CPT | Mod: S$GLB,,, | Performed by: ALLERGY & IMMUNOLOGY

## 2022-11-18 PROCEDURE — 99214 PR OFFICE/OUTPT VISIT, EST, LEVL IV, 30-39 MIN: ICD-10-PCS | Mod: 25,S$GLB,, | Performed by: ALLERGY & IMMUNOLOGY

## 2022-11-18 PROCEDURE — 1159F PR MEDICATION LIST DOCUMENTED IN MEDICAL RECORD: ICD-10-PCS | Mod: CPTII,S$GLB,, | Performed by: ALLERGY & IMMUNOLOGY

## 2022-11-18 PROCEDURE — 3078F PR MOST RECENT DIASTOLIC BLOOD PRESSURE < 80 MM HG: ICD-10-PCS | Mod: CPTII,S$GLB,, | Performed by: ALLERGY & IMMUNOLOGY

## 2022-11-18 PROCEDURE — 3008F BODY MASS INDEX DOCD: CPT | Mod: CPTII,S$GLB,, | Performed by: ALLERGY & IMMUNOLOGY

## 2022-11-18 PROCEDURE — 3078F DIAST BP <80 MM HG: CPT | Mod: CPTII,S$GLB,, | Performed by: ALLERGY & IMMUNOLOGY

## 2022-11-18 PROCEDURE — 99999 PR PBB SHADOW E&M-EST. PATIENT-LVL IV: CPT | Mod: PBBFAC,,, | Performed by: ALLERGY & IMMUNOLOGY

## 2022-11-18 PROCEDURE — 3077F PR MOST RECENT SYSTOLIC BLOOD PRESSURE >= 140 MM HG: ICD-10-PCS | Mod: CPTII,S$GLB,, | Performed by: ALLERGY & IMMUNOLOGY

## 2022-11-18 PROCEDURE — 3077F SYST BP >= 140 MM HG: CPT | Mod: CPTII,S$GLB,, | Performed by: ALLERGY & IMMUNOLOGY

## 2022-11-18 PROCEDURE — 3044F PR MOST RECENT HEMOGLOBIN A1C LEVEL <7.0%: ICD-10-PCS | Mod: CPTII,S$GLB,, | Performed by: ALLERGY & IMMUNOLOGY

## 2022-11-18 RX ORDER — PREDNISONE 20 MG/1
20 TABLET ORAL 2 TIMES DAILY
Qty: 10 TABLET | Refills: 0 | Status: SHIPPED | OUTPATIENT
Start: 2022-11-18 | End: 2022-11-28

## 2022-11-18 NOTE — PROGRESS NOTES
"Subjective:   Patient: Alma Vivar [id number], :1965   Visit date:2022 11:28 AM    Chief Complaint:  Follow-up    HPI:    Prior notes reviewed by myself.  Clinical documentation obtained by nursing staff reviewed.       HPI:   3-5 hours after eating shellfish- itchy, rapid heart beat, angioedema- ER AND "GET THE SHOT"  She has an Epipen.    History of "weird reactions" -   Tomatoes- cause same symptoms as mentioned above  Wine- certain one- itchy and upset stomach    Allegra for nasal congestion        2022-Moved to [place] 6 yrs ago and since has had several, reoccurring episodes of anaphylaxis. Was well controlled with strict avoidence of shrimp and tomato prior to moving to [place].   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 Tested 20 yrs ago. She has an epipen but is .   During episodes her fist ssx include itching, swelling of tongue, tachycardia and pt reported if she catches early enough and takes Benadryl sometimes this is sufficient to relieve symptoms. Sometimes escalates to throat closing and pt is seen in ED.  Believes she is now having episodes from other/all shellfish and possibly fish, green peppers, and wheat  Red wine- causes diffuse pruritis and erythema (face) - [place] jacqui only    No significant environmental ssx/allergies.     Environmental History:  Environmental Hx: Pets in the home: cats (1). No smoke exposure.       Past Medical History:   Diagnosis Date    Abnormal glandular Papanicolaou smear of cervix 2018 10:09:15 AM    [facility]: Abnormal PAP Smear-No Additional Notes    Abnormal glandular Papanicolaou smear of cervix 2018 10:09:15 AM    [facility]: Abnormal PAP Smear-No Additional Notes    Hormone replacement therapy (HRT)     Human papillomavirus in conditions classified elsewhere and of unspecified site 10/6/2020 8:36:36 AM    [facility] - [facility]: HPV in " female-No Additional Notes    Human papillomavirus in conditions classified elsewhere and of unspecified site 10/6/2020 8:36:36 AM    [facility] - [facility]: HPV in female-No Additional Notes    Hyperlipidemia     Other specified noninflammatory disorder of vagina 8/20/2018 9:52:03 AM    [facility] - [facility]: Vaginal lesion-No Additional Notes    Other specified noninflammatory disorder of vagina 8/20/2018 9:52:03 AM    [facility] - [facility]: Vaginal Lesion-No Additional Notes        Family History   Problem Relation Age of Onset    Hypertension Mother     Bipolar disorder Father     Cirrhosis Father     Kidney failure Father     Mental illness Father     Ovarian cancer Maternal Grandmother     Cancer Maternal Grandmother     Depression Paternal Grandmother        Social History     Socioeconomic History    Marital status:      Spouse name: Roger    Number of children: 2   Occupational History     Comment: .    Tobacco Use    Smoking status: Never    Smokeless tobacco: Never   Substance and Sexual Activity    Alcohol use: Yes     Alcohol/week: 1.0 standard drink     Types: 1 Glasses of wine per week     Comment: wine    Drug use: No    Sexual activity: Not Currently     Social Determinants of Health     Financial Resource Strain: Unknown    Difficulty of Paying Living Expenses: Patient refused   Food Insecurity: Unknown    Worried About Running Out of Food in the Last Year: Patient refused    Ran Out of Food in the Last Year: Patient refused   Transportation Needs: Unknown    Lack of Transportation (Medical): Patient refused    Lack of Transportation (Non-Medical): Patient refused   Physical Activity: Unknown    Days of Exercise per Week: Patient refused   Stress: Unknown    Feeling of Stress : Patient refused   Social Connections: Unknown    Frequency of Communication with Friends and Family: Patient refused    Frequency of Social Gatherings with  "Friends and Family: Patient refused    Active Member of Clubs or Organizations: Patient refused    Attends Club or Organization Meetings: Patient refused    Marital Status:    Housing Stability: Unknown    Unable to Pay for Housing in the Last Year: Patient refused    Number of Places Lived in the Last Year: 1    Unstable Housing in the Last Year: Patient refused       Review of patient's allergies indicates:   Allergen Reactions    Tomato (solanum lycopersicum) Anaphylaxis    Cpd vehicle sol.sugarfree no.1      Agave    Shellfish containing products Itching    Sulfites     Tomato     Sulfa (sulfonamide antibiotics) Rash           Medication List with Changes/Refills   New Medications    PREDNISONE (DELTASONE) 20 MG TABLET    Take 1 tablet (20 mg total) by mouth 2 (two) times daily.   Current Medications    ASPIRIN (ECOTRIN) 81 MG EC TABLET    Take 81 mg by mouth once daily.    EPINEPHRINE (EPIPEN) 0.3 MG/0.3 ML ATIN    Inject 0.3 mLs (0.3 mg total) into the muscle once. If symptoms not improved repeat in about 5-15 minutes - inject a second dose (pen) of 0.3 mL into muscle once. for 1 dose    FEXOFENADINE (ALLEGRA) 180 MG TABLET    Take 180 mg by mouth once daily.    FLUTICASONE (VERAMYST) 27.5 MCG/ACTUATION NASAL SPRAY    2 sprays by Nasal route once daily.         Objective:     Physical Exam:  Vitals:  BP (!) 188/73 (BP Location: Left arm, Patient Position: Sitting)   Pulse 73   Temp 98.2 °F (36.8 °C) (Temporal)   Ht 5' 1" (1.549 m)   Wt 64.5 kg (142 lb 3.2 oz)   BMI 26.87 kg/m²   Constitutional:       General: No acute distress. Alert, well developed.   HENT:      Head: Normocephalic, no lesions.      Right Ear: Pinna and external ear appears normal.      Left Ear: Pinna and external ear appears normal.      Nose: No mass or lesion. Clear mucus. BIT's WNL.     Mouth/Throat: No oropharyngeal exudate or posterior oropharyngeal erythema.   Eyes:      General: No scleral icterus.Sclera white, " extraocular movements intact.        Right eye: No discharge.         Left eye: No discharge.   Neck: Supple, no palpable nodes, no masses, trachea midline, no thyromegaly.   Cardiovascular:      Rate and Rhythm: Normal rate and regular rhythm.      Heart sounds: Normal heart sounds. No murmur heard.   Pulmonary:      Effort: Pulmonary effort is normal. No respiratory distress.   Musculoskeletal:         General: No swelling, tenderness, deformity or signs of injury.    Skin:     General: Skin is warm.      Coloration: Skin is not jaundiced or pale.      Findings: No bruising, erythema, or rash.   Neurological:      Alert. Moves all extremities spontaneously  Psychiatric:         Mood is normal. Behavior is normal.        Allergy Skin Prick testing  Histamine  Saline   She has an appropriate response to positive and negative controls.  The following were negative:  Strawberry  Crab, catfish, lobster, oyster, shrimp,salmon, trout, tuna, cod, clam, lemon,hops,tomato, apple, apricot, cantaloupe, grapefruit, orange, scallops  I interpreted the test.    Assessment & Plan:   Seasonal allergic rhinitis due to pollen    Sulfite allergy    History of anaphylaxis    Food allergy    Other orders  -     predniSONE (DELTASONE) 20 MG tablet; Take 1 tablet (20 mg total) by mouth 2 (two) times daily.  Dispense: 10 tablet; Refill: 0      Given history of 3-5 hours after eating having symptoms, likely non-IgE mediated.   Recommend avoidance of all foods that have caused symptoms.  Discussed sulfite allergy and list of sulfite containing foods given on AVS.  Continue Allegra for allergy symtoms.  RTC 1 year or sooner, if needed      Thank you for allowing me to participate in the care of AlmaTRAMAINE Cox MD    I spent a total of 36 minutes on the day of the visit.This includes face to face time and non-face to face time preparing to see the patient (eg, review of tests), obtaining and/or reviewing separately obtained history,  documenting clinical information in the electronic or other health record, independently interpreting results and communicating results to the patient/family/caregiver, or care coordinator.

## 2022-11-18 NOTE — PATIENT INSTRUCTIONS
Recommend she avoid salicylates- beer, wine, coffee, team carbinated drinks, sweet potatoes, mint or wintergreen products, almonds, peanuts, avocados, mayonnaise, olives, olive oil, salad dressings, apples, pricots, berries, cherries, currants, date, gooseberries, grapes, huckleberries, oranges, mandarins, nectarines, peaches, pineapple, plums, prunes, pomegranates, alfalfa sprouts, asparagus, beetroot, broccoli, chili, cucumber, endive, olives, peppers, radishes, tomatoes, zucchini, cloves, pickles, red, white or cedar)vinegar, aniseed, basil, bay leaf, chili powder, taylor, coriander, nutmeg, vanilla essence and pepper

## 2022-11-28 ENCOUNTER — OFFICE VISIT (OUTPATIENT)
Dept: PRIMARY CARE CLINIC | Facility: CLINIC | Age: 57
End: 2022-11-28
Payer: COMMERCIAL

## 2022-11-28 ENCOUNTER — HOSPITAL ENCOUNTER (OUTPATIENT)
Dept: RADIOLOGY | Facility: HOSPITAL | Age: 57
Discharge: HOME | End: 2022-11-28
Attending: PHYSICIAN ASSISTANT
Payer: COMMERCIAL

## 2022-11-28 VITALS
BODY MASS INDEX: 26.51 KG/M2 | OXYGEN SATURATION: 97 % | SYSTOLIC BLOOD PRESSURE: 128 MMHG | WEIGHT: 140.44 LBS | HEIGHT: 61 IN | TEMPERATURE: 97 F | HEART RATE: 74 BPM | DIASTOLIC BLOOD PRESSURE: 80 MMHG

## 2022-11-28 DIAGNOSIS — M25.561 ACUTE PAIN OF RIGHT KNEE: Primary | ICD-10-CM

## 2022-11-28 DIAGNOSIS — M25.561 ACUTE PAIN OF RIGHT KNEE: ICD-10-CM

## 2022-11-28 PROCEDURE — 99213 PR OFFICE/OUTPT VISIT, EST, LEVL III, 20-29 MIN: ICD-10-PCS | Mod: S$GLB,,, | Performed by: PHYSICIAN ASSISTANT

## 2022-11-28 PROCEDURE — 99999 PR PBB SHADOW E&M-EST. PATIENT-LVL IV: CPT | Mod: PBBFAC,,, | Performed by: PHYSICIAN ASSISTANT

## 2022-11-28 PROCEDURE — 3079F PR MOST RECENT DIASTOLIC BLOOD PRESSURE 80-89 MM HG: ICD-10-PCS | Mod: CPTII,S$GLB,, | Performed by: PHYSICIAN ASSISTANT

## 2022-11-28 PROCEDURE — 73562 XR KNEE ORTHO BILAT: ICD-10-PCS | Mod: 26,LT,, | Performed by: RADIOLOGY

## 2022-11-28 PROCEDURE — 73562 X-RAY EXAM OF KNEE 3: CPT | Mod: 26,LT,, | Performed by: RADIOLOGY

## 2022-11-28 PROCEDURE — 1159F PR MEDICATION LIST DOCUMENTED IN MEDICAL RECORD: ICD-10-PCS | Mod: CPTII,S$GLB,, | Performed by: PHYSICIAN ASSISTANT

## 2022-11-28 PROCEDURE — 3008F PR BODY MASS INDEX (BMI) DOCUMENTED: ICD-10-PCS | Mod: CPTII,S$GLB,, | Performed by: PHYSICIAN ASSISTANT

## 2022-11-28 PROCEDURE — 1160F PR REVIEW ALL MEDS BY PRESCRIBER/CLIN PHARMACIST DOCUMENTED: ICD-10-PCS | Mod: CPTII,S$GLB,, | Performed by: PHYSICIAN ASSISTANT

## 2022-11-28 PROCEDURE — 1160F RVW MEDS BY RX/DR IN RCRD: CPT | Mod: CPTII,S$GLB,, | Performed by: PHYSICIAN ASSISTANT

## 2022-11-28 PROCEDURE — 3074F SYST BP LT 130 MM HG: CPT | Mod: CPTII,S$GLB,, | Performed by: PHYSICIAN ASSISTANT

## 2022-11-28 PROCEDURE — 3044F PR MOST RECENT HEMOGLOBIN A1C LEVEL <7.0%: ICD-10-PCS | Mod: CPTII,S$GLB,, | Performed by: PHYSICIAN ASSISTANT

## 2022-11-28 PROCEDURE — 3074F PR MOST RECENT SYSTOLIC BLOOD PRESSURE < 130 MM HG: ICD-10-PCS | Mod: CPTII,S$GLB,, | Performed by: PHYSICIAN ASSISTANT

## 2022-11-28 PROCEDURE — 73562 X-RAY EXAM OF KNEE 3: CPT | Mod: 26,RT,, | Performed by: RADIOLOGY

## 2022-11-28 PROCEDURE — 3008F BODY MASS INDEX DOCD: CPT | Mod: CPTII,S$GLB,, | Performed by: PHYSICIAN ASSISTANT

## 2022-11-28 PROCEDURE — 3044F HG A1C LEVEL LT 7.0%: CPT | Mod: CPTII,S$GLB,, | Performed by: PHYSICIAN ASSISTANT

## 2022-11-28 PROCEDURE — 99999 PR PBB SHADOW E&M-EST. PATIENT-LVL IV: ICD-10-PCS | Mod: PBBFAC,,, | Performed by: PHYSICIAN ASSISTANT

## 2022-11-28 PROCEDURE — 1159F MED LIST DOCD IN RCRD: CPT | Mod: CPTII,S$GLB,, | Performed by: PHYSICIAN ASSISTANT

## 2022-11-28 PROCEDURE — 73562 X-RAY EXAM OF KNEE 3: CPT | Mod: TC,50

## 2022-11-28 PROCEDURE — 99213 OFFICE O/P EST LOW 20 MIN: CPT | Mod: S$GLB,,, | Performed by: PHYSICIAN ASSISTANT

## 2022-11-28 PROCEDURE — 3079F DIAST BP 80-89 MM HG: CPT | Mod: CPTII,S$GLB,, | Performed by: PHYSICIAN ASSISTANT

## 2022-11-28 NOTE — PROGRESS NOTES
"Subjective:      Patient ID: Alma Vivar is a 56 y.o. female.    Chief Complaint: Knee Pain (Right x 3 weeks )    Alma Vivar is a 56 y.o. female who presents to clinic for acute pain of right knee.  Started 3 weeks ago.  Hx of baker cyst but not really bothersome but did become swollen and painful.  Doesn't recall any injury to knee.  Tenderness along medial aspect of knee joint line.  Has tried nsaids without much relief. Wearing a brace during day.  Trying not to bend knee very much     Review of Systems   Constitutional:  Negative for activity change, appetite change, fatigue, fever and unexpected weight change.   HENT:  Negative for congestion, ear pain, sore throat and trouble swallowing.    Respiratory:  Negative for cough and shortness of breath.    Cardiovascular:  Negative for chest pain and palpitations.   Gastrointestinal:  Negative for abdominal distention, abdominal pain, constipation, diarrhea, nausea and vomiting.   Genitourinary:  Negative for difficulty urinating, frequency and urgency.   Musculoskeletal:  Negative for arthralgias and myalgias.        Right knee pain    Neurological:  Negative for dizziness, weakness and light-headedness.   Psychiatric/Behavioral:  Negative for decreased concentration and dysphoric mood. The patient is not nervous/anxious.      Objective:   /80   Pulse 74   Temp 97.1 °F (36.2 °C) (Temporal)   Ht 5' 1" (1.549 m)   Wt 63.7 kg (140 lb 6.9 oz)   SpO2 97%   BMI 26.53 kg/m²   Physical Exam  Vitals reviewed.   Constitutional:       General: She is not in acute distress.     Appearance: She is well-developed. She is not diaphoretic.   HENT:      Head: Normocephalic and atraumatic.      Right Ear: External ear normal.      Left Ear: External ear normal.      Nose: Nose normal.   Cardiovascular:      Rate and Rhythm: Normal rate.   Pulmonary:      Effort: Pulmonary effort is normal. No respiratory distress.   Musculoskeletal:      Right " knee: No bony tenderness. MCL laxity (very mild - mild tenderness along medial lateral line) present. No LCL laxity, ACL laxity or PCL laxity. Normal alignment and normal patellar mobility.      Instability Tests: Anterior drawer test negative. Posterior drawer test negative.   Skin:     General: Skin is warm and dry.      Capillary Refill: Capillary refill takes less than 2 seconds.   Neurological:      Mental Status: She is alert and oriented to person, place, and time.      Motor: No abnormal muscle tone.   Psychiatric:         Behavior: Behavior normal.     Assessment:      1. Acute pain of right knee       Plan:   Acute pain of right knee  -     X-ray Knee Ortho Bilateral; Future; Expected date: 11/28/2022  -     Ambulatory referral/consult to Physical/Occupational Therapy; Future; Expected date: 12/05/2022    Medial line tenderness suspect strain, discussed PT with possible strengthening of opposing muscles like glute med, cont. brace, can take ibuprofen, if not improv with PT then Ortho follow-up     Candis Romo PA-C   Physician Assistant   [facility]

## 2022-12-06 ENCOUNTER — OFFICE VISIT (OUTPATIENT)
Dept: CARDIOLOGY | Facility: CLINIC | Age: 57
End: 2022-12-06
Payer: COMMERCIAL

## 2022-12-06 VITALS
HEART RATE: 69 BPM | WEIGHT: 140.88 LBS | OXYGEN SATURATION: 98 % | SYSTOLIC BLOOD PRESSURE: 122 MMHG | DIASTOLIC BLOOD PRESSURE: 64 MMHG | BODY MASS INDEX: 26.62 KG/M2

## 2022-12-06 DIAGNOSIS — R07.9 CHEST PAIN, UNSPECIFIED TYPE: Primary | ICD-10-CM

## 2022-12-06 DIAGNOSIS — R03.0 ELEVATED BLOOD PRESSURE READING: ICD-10-CM

## 2022-12-06 DIAGNOSIS — R94.31 ABNORMAL ECG DURING EXERCISE STRESS TEST: ICD-10-CM

## 2022-12-06 DIAGNOSIS — E78.00 ELEVATED LDL CHOLESTEROL LEVEL: ICD-10-CM

## 2022-12-06 DIAGNOSIS — G47.33 OSA (OBSTRUCTIVE SLEEP APNEA): ICD-10-CM

## 2022-12-06 PROCEDURE — 3078F PR MOST RECENT DIASTOLIC BLOOD PRESSURE < 80 MM HG: ICD-10-PCS | Mod: CPTII,S$GLB,, | Performed by: STUDENT IN AN ORGANIZED HEALTH CARE EDUCATION/TRAINING PROGRAM

## 2022-12-06 PROCEDURE — 1159F PR MEDICATION LIST DOCUMENTED IN MEDICAL RECORD: ICD-10-PCS | Mod: CPTII,S$GLB,, | Performed by: STUDENT IN AN ORGANIZED HEALTH CARE EDUCATION/TRAINING PROGRAM

## 2022-12-06 PROCEDURE — 99214 PR OFFICE/OUTPT VISIT, EST, LEVL IV, 30-39 MIN: ICD-10-PCS | Mod: S$GLB,,, | Performed by: STUDENT IN AN ORGANIZED HEALTH CARE EDUCATION/TRAINING PROGRAM

## 2022-12-06 PROCEDURE — 3078F DIAST BP <80 MM HG: CPT | Mod: CPTII,S$GLB,, | Performed by: STUDENT IN AN ORGANIZED HEALTH CARE EDUCATION/TRAINING PROGRAM

## 2022-12-06 PROCEDURE — 3074F SYST BP LT 130 MM HG: CPT | Mod: CPTII,S$GLB,, | Performed by: STUDENT IN AN ORGANIZED HEALTH CARE EDUCATION/TRAINING PROGRAM

## 2022-12-06 PROCEDURE — 3044F PR MOST RECENT HEMOGLOBIN A1C LEVEL <7.0%: ICD-10-PCS | Mod: CPTII,S$GLB,, | Performed by: STUDENT IN AN ORGANIZED HEALTH CARE EDUCATION/TRAINING PROGRAM

## 2022-12-06 PROCEDURE — 3008F PR BODY MASS INDEX (BMI) DOCUMENTED: ICD-10-PCS | Mod: CPTII,S$GLB,, | Performed by: STUDENT IN AN ORGANIZED HEALTH CARE EDUCATION/TRAINING PROGRAM

## 2022-12-06 PROCEDURE — 3074F PR MOST RECENT SYSTOLIC BLOOD PRESSURE < 130 MM HG: ICD-10-PCS | Mod: CPTII,S$GLB,, | Performed by: STUDENT IN AN ORGANIZED HEALTH CARE EDUCATION/TRAINING PROGRAM

## 2022-12-06 PROCEDURE — 99999 PR PBB SHADOW E&M-EST. PATIENT-LVL III: ICD-10-PCS | Mod: PBBFAC,,, | Performed by: STUDENT IN AN ORGANIZED HEALTH CARE EDUCATION/TRAINING PROGRAM

## 2022-12-06 PROCEDURE — 3008F BODY MASS INDEX DOCD: CPT | Mod: CPTII,S$GLB,, | Performed by: STUDENT IN AN ORGANIZED HEALTH CARE EDUCATION/TRAINING PROGRAM

## 2022-12-06 PROCEDURE — 99999 PR PBB SHADOW E&M-EST. PATIENT-LVL III: CPT | Mod: PBBFAC,,, | Performed by: STUDENT IN AN ORGANIZED HEALTH CARE EDUCATION/TRAINING PROGRAM

## 2022-12-06 PROCEDURE — 1159F MED LIST DOCD IN RCRD: CPT | Mod: CPTII,S$GLB,, | Performed by: STUDENT IN AN ORGANIZED HEALTH CARE EDUCATION/TRAINING PROGRAM

## 2022-12-06 PROCEDURE — 3044F HG A1C LEVEL LT 7.0%: CPT | Mod: CPTII,S$GLB,, | Performed by: STUDENT IN AN ORGANIZED HEALTH CARE EDUCATION/TRAINING PROGRAM

## 2022-12-06 PROCEDURE — 99214 OFFICE O/P EST MOD 30 MIN: CPT | Mod: S$GLB,,, | Performed by: STUDENT IN AN ORGANIZED HEALTH CARE EDUCATION/TRAINING PROGRAM

## 2022-12-06 NOTE — PROGRESS NOTES
Section of Cardiology                  Cardiac Clinic Note    Chief Complaint/Reason for consultation:  Chest pain      HPI:   Alma Vivar is a 56 y.o. female with h/o no significant PMH who was referred to cardiology clinic by ADELAIDA Romo for evaluation.      7/26/2022  Has been seen in the ED for chest pain/shortness of breath symptoms x2, negative workup  First episode of chest pain was in [place], about 3 weeks ago, had a pulling sensation in her chest with SOB (can't take deep breaths)  Got poison ivy (allergic) and had to take prednisone (symptoms started when taking prednisone) for 2 weeks   Other symptoms occur with waking up   Get LH at time and palpitations also  Symptoms last hours   Symptoms don't worsen with activity  Exercises regularly, but has stopped due to symptoms, at least 4 days a week   She is an E[employer] speciality, travels for work and family    Active at home and generally     denies tobacco abuse. ETOH occ  Family hx: HTN, cancer     Denies syncope.  Denies DM, HTN, CVA      8/30/22  ETT 7/22 abnormal   Nuclear stress test 8/22 without ischemia- normal exercise capacity  Echocardiogram with normal EF  Blood pressure on a nuclear stress at rest 142/80  Has not had any chest pain  BP normal today   Would like to start exercising again- was holding off until results came back  Being worked up for sleep apnea     Denies SOB, syncope, orthopnea        12/6/22  Doing well  Weight decreased 2 lbs  Has been active  Started on CPAP for COLTEN  Says she's sleeping better, has more engergy    Denies chest pain, SOB      EKG 7/20/2022 NSR, no acute ST - T wave changes    ECHO  7/22  The left ventricle is normal in size with normal systolic function.  The estimated ejection fraction is 65%.  Normal left ventricular diastolic function.  Normal right ventricular size with normal right ventricular systolic function.  Mild tricuspid regurgitation.  Normal central venous pressure (3  mmHg).  The estimated PA systolic pressure is 21 mmHg.    STRESS TEST    [facility]      ROS: All 10 systems reviewed. Please refer to the HPI for pertinent positives. All other systems negative.     Past Medical History  Past Medical History:   Diagnosis Date    Abnormal glandular Papanicolaou smear of cervix 2018 10:09:15 AM    [facility] - [facility]: Abnormal PAP Smear-No Additional Notes    Abnormal glandular Papanicolaou smear of cervix 2018 10:09:15 AM    [facility] - [facility]: Abnormal PAP Smear-No Additional Notes    Hormone replacement therapy (HRT)     Human papillomavirus in conditions classified elsewhere and of unspecified site 10/6/2020 8:36:36 AM    [facility] - [facility]: HPV in female-No Additional Notes    Human papillomavirus in conditions classified elsewhere and of unspecified site 10/6/2020 8:36:36 AM    [facility] - [facility]: HPV in female-No Additional Notes    Hyperlipidemia     Other specified noninflammatory disorder of vagina 2018 9:52:03 AM    [facility] - [facility]: Vaginal lesion-No Additional Notes    Other specified noninflammatory disorder of vagina 2018 9:52:03 AM    [facility] - [facility]: Vaginal Lesion-No Additional Notes       Surgical History  Past Surgical History:   Procedure Laterality Date     SECTION      HYSTERECTOMY            Allergies:   Review of patient's allergies indicates:   Allergen Reactions    Tomato (solanum lycopersicum) Anaphylaxis    Cpd vehicle sol.sugarfree no.1      Agave    Shellfish containing products Itching    Sulfites     Tomato     Sulfa (sulfonamide antibiotics) Rash       Social History:  Social History     Socioeconomic History    Marital status:      Spouse name: Roger    Number of children: 2   Occupational History     Comment: .    Tobacco Use    Smoking status: Never    Smokeless tobacco: Never   Substance and Sexual  Activity    Alcohol use: Yes     Alcohol/week: 1.0 standard drink     Types: 1 Glasses of wine per week     Comment: wine    Drug use: No    Sexual activity: Not Currently     Social Determinants of Health     Financial Resource Strain: Unknown    Difficulty of Paying Living Expenses: Patient refused   Food Insecurity: Unknown    Worried About Running Out of Food in the Last Year: Patient refused    Ran Out of Food in the Last Year: Patient refused   Transportation Needs: Unknown    Lack of Transportation (Medical): Patient refused    Lack of Transportation (Non-Medical): Patient refused   Physical Activity: Unknown    Days of Exercise per Week: Patient refused   Stress: Unknown    Feeling of Stress : Patient refused   Social Connections: Unknown    Frequency of Communication with Friends and Family: Patient refused    Frequency of Social Gatherings with Friends and Family: Patient refused    Active Member of Clubs or Organizations: Patient refused    Attends Club or Organization Meetings: Patient refused    Marital Status:    Housing Stability: Unknown    Unable to Pay for Housing in the Last Year: Patient refused    Number of Places Lived in the Last Year: 1    Unstable Housing in the Last Year: Patient refused       Family History:  family history includes Bipolar disorder in her father; Cancer in her maternal grandmother; Cirrhosis in her father; Depression in her paternal grandmother; Hypertension in her mother; Kidney failure in her father; Mental illness in her father; Ovarian cancer in her maternal grandmother.    Home Medications:  Current Outpatient Medications on File Prior to Visit   Medication Sig Dispense Refill    EPINEPHrine (EPIPEN) 0.3 mg/0.3 mL AtIn Inject 0.3 mLs (0.3 mg total) into the muscle once. If symptoms not improved repeat in about 5-15 minutes - inject a second dose (pen) of 0.3 mL into muscle once. for 1 dose 2 each 2    fexofenadine (ALLEGRA) 180 MG tablet Take 180 mg by mouth  once daily.      [DISCONTINUED] aspirin (ECOTRIN) 81 MG EC tablet Take 81 mg by mouth once daily.       No current facility-administered medications on file prior to visit.       Physical exam:  /64   Pulse 69   Wt 63.9 kg (140 lb 14 oz)   SpO2 98%   BMI 26.62 kg/m²         General: Pt is a 56 y.o. year old female who is AAOx3, in NAD, is pleasant, well nourished, looks stated age  HEENT: PERRL, EOMI, Oral mucosa pink & moist  CVS  No abnormal cardiac pulsations noted on inspection. JVP not raised. The apical impulse is normal on palpation, and is located in the left 5th intercostal space in the mid - clavicular line. No palpable thrills or abnormal pulsations noted. RR, S1 - S2 heard, no murmurs, rubs or gallops appreciated.   PUL : CTA B/L. No wheezes/crackles heard   ABD : BS +, soft. No tenderness elicited   LE : No C/C/E. Distal Pulses palpable B/L         LABS:    Chemistry:   Lab Results   Component Value Date     07/20/2022    K 5.0 07/20/2022     07/20/2022    CO2 23 07/20/2022    BUN 17 07/20/2022    CREATININE 0.9 07/20/2022    CALCIUM 9.2 07/20/2022     Cardiac Markers:   Lab Results   Component Value Date    TROPONINI <0.006 07/20/2022     Cardiac Markers (Last 3):   Lab Results   Component Value Date    TROPONINI <0.006 07/20/2022     CBC:   Lab Results   Component Value Date    WBC 5.61 09/27/2022    HGB 13.4 09/27/2022    HCT 41.6 09/27/2022    MCV 94 09/27/2022     09/27/2022     Lipids:   Lab Results   Component Value Date    CHOL 255 (H) 07/19/2022    TRIG 76 07/19/2022    HDL 73 07/19/2022    HDL 51 08/07/2015     Coagulation: No results found for: PT, INR, APTT        Assessment      1. Chest pain, unspecified type    2. Elevated LDL cholesterol level    3. Elevated blood pressure reading    4. Abnormal ECG during exercise stress test    5. COLTEN (obstructive sleep apnea)             Plan:     Chest pain- resolved  ETT 7/22 abnormal   Nuclear stress test 8/22 without  ischemia  Echocardiogram with normal EF    COLTEN  Started on CPAP machine    Elevated blood pressure reading  No history of hypertension  Stable today    Elevated LDL level  Reports diet and exercise, does not eat much protein  10 year ASCVD risk 2.3%  Repeat lipid panel pending     This note was prepared using voice recognition system and is likely to have sound alike errors that may have been overlooked even after proofreading.     I have reviewed all pertinent chart information.  Plans and recommendations have been formulated under my direct supervision. All questions answered and patient voiced understanding.   If symptoms persist go to the ED.    RTC in 3 months        Letty Matthews MD  Cardiology

## 2022-12-13 ENCOUNTER — CLINICAL SUPPORT (OUTPATIENT)
Dept: REHABILITATION | Facility: HOSPITAL | Age: 57
End: 2022-12-13
Payer: COMMERCIAL

## 2022-12-13 DIAGNOSIS — M25.561 ACUTE PAIN OF RIGHT KNEE: Primary | ICD-10-CM

## 2022-12-13 DIAGNOSIS — M25.661 DECREASED RANGE OF MOTION OF RIGHT KNEE: ICD-10-CM

## 2022-12-13 DIAGNOSIS — Z74.09 DECREASED STRENGTH, ENDURANCE, AND MOBILITY: ICD-10-CM

## 2022-12-13 DIAGNOSIS — R53.1 DECREASED STRENGTH, ENDURANCE, AND MOBILITY: ICD-10-CM

## 2022-12-13 DIAGNOSIS — R68.89 DECREASED STRENGTH, ENDURANCE, AND MOBILITY: ICD-10-CM

## 2022-12-13 PROCEDURE — 97161 PT EVAL LOW COMPLEX 20 MIN: CPT | Performed by: PHYSICAL THERAPIST

## 2022-12-13 PROCEDURE — 97110 THERAPEUTIC EXERCISES: CPT | Performed by: PHYSICAL THERAPIST

## 2022-12-13 NOTE — PLAN OF CARE
"[facility]   Physical Therapy Initial Evaluation   Date: 12/13/2022   Name: Alma Alfonso Houma  Clinic Number: [id number]    Therapy Diagnosis:    Encounter Diagnoses   Name Primary?    Acute pain of right knee Yes    Decreased range of motion of right knee     Decreased strength, endurance, and mobility       Physician: Candis Romo PA-C     Physician Orders: PT Eval and Treat  Medical Diagnosis from Referral: acute pain of right knee  Evaluation Date: 12/13/2022  Authorization Period Expiration: 12/31/2022  Plan of Care Expiration: 3/13/2023  Progress Note Due: 1/12/2023  Visit # / Visits authorized: 1/1   FOTO: 1/3 (last performed on 12/13/2022)    Precautions: Standard    Time In: 1650  Time Out: 1735  Total Billable Time (timed & untimed codes): 45 minutes    SUBJECTIVE   Date of onset: approximately one month ago    History of current condition - Alma reports that approximately one month ago, her right knee started hurting insidiously. After a few days it became debilitating pain. She started taking ibuprofen and put a brace/sleeve, which helped some. The pain was pretty uncomfortable at night. Patient has been giving it time to heal, but it isn't getting completely better. She states that she has a history of a Baker's cyst that was diagnosed about 10 years ago, but it has never really given her any issues. Patient states that she saw her PCP who did x-rays. She reports that she was told that she does have some arthritis in the knee. Patient still has pain in a kneeling position, and she has been unable to return to typical exercises such as yoga or biking. She has been able to slowly return to walking some. Patient states that she feels she has not gotten her full range of motion back yet, and she feels/hears a "crunching" in the knee. She states that she has a history of some knee issues, such as discomfort or instability, where she limits her side to side movements " such as side lunges, etc.     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   Current Level of Function: Independent with all ADL, IADL, community mobility and functional activities with reports of increased pain and need for increased time and frequent breaks.      Dominant Extremity:    [x] Right    [] Left    Pts goals: Pt reported goals are to decrease overall pain levels in order to return to prior functional level.     Medical History:   Past Medical History:   Diagnosis Date    Abnormal glandular Papanicolaou smear of cervix 8/20/2018 10:09:15 AM    [facility] - [facility]: Abnormal PAP Smear-No Additional Notes    Abnormal glandular Papanicolaou smear of cervix 8/20/2018 10:09:15 AM    [facility] - [facility]: Abnormal PAP Smear-No Additional Notes    Hormone replacement therapy (HRT)     Human papillomavirus in conditions classified elsewhere and of unspecified site 10/6/2020 8:36:36 AM    [facility] Historical - [facility]: HPV in female-No Additional Notes    Human papillomavirus in conditions classified elsewhere and of unspecified site 10/6/2020 8:36:36 AM    [facility] Historical - [facility]: HPV in female-No Additional Notes    Hyperlipidemia     Other specified noninflammatory disorder of vagina 8/20/2018 9:52:03 AM    [facility] Historical - [facility]: Vaginal lesion-No Additional Notes    Other specified noninflammatory disorder of vagina  2018 9:52:03 AM    [facility] Historical - [facility]: Vaginal Lesion-No Additional Notes       Surgical History:   Alma Vivar  has a past surgical history that includes Hysterectomy and  section.    Medications:   Alma has a current medication list which includes the following prescription(s): epinephrine and fexofenadine.    Allergies:   Review of patient's allergies indicates:   Allergen Reactions    Tomato (solanum lycopersicum) Anaphylaxis    Cpd vehicle sol.sugarfree no.1      Agave    Shellfish containing products Itching    Sulfites     Tomato     Sulfa (sulfonamide antibiotics) Rash        OBJECTIVE     Range of Motion:    Knee AROM/PROM Right Left Pain/Dysfunction with Movement Goal   Knee Flexion (135º) & Knee Extension (0º) 0-1-137 6-0-144 Minimal discomfort with flexion on right 6-0-144        Strength:    L/E MMT Right   Left Pain/Dysfunction with Movement Goal   Hip Flexion  MMT SCORES: 4+/5 MMT SCORES: 4+/5 No pain with MMT's today 4+/5 B   Hip Extension  MMT SCORES: 3+/5 MMT SCORES: 4-/5  4+/5 B   Hip Abduction  MMT SCORES: 4-/5 MMT SCORES: 4-/5  4+/5 B   Knee Extension MMT SCORES: 4+/5 MMT SCORES: 4+/5  5/5 B   Knee Flexion MMT SCORES: 4+/5 MMT SCORES: 4+/5  5/5 B   Ankle DF MMT SCORES: 5/5 MMT SCORES: 5/5  5/5 B   Ankle PF MMT SCORES: 4+/5 MMT SCORES: 4+/5  5/5 B        Muscle Length:       Muscle Tested  Right Left  Goal   Hip Flexors [] Normal      [x] Limited [] Normal      [x] Limited Normal B   ITB / TFL [] Normal      [x] Limited [] Normal      [x] Limited    Quadriceps [] Normal      [x] Limited [] Normal      [x] Limited Normal B   Hamstrings  [] Normal      [x] Limited [] Normal      [x] Limited Normal B   Gastrocnemius  [] Normal      [x] Limited [] Normal      [x] Limited Normal B   Soleus  [] Normal      [x] Limited [] Normal      [x] Limited Normal B        Joint Mobility:     Joint Motion Right Mobility  (spine) Left Mobility Goal   Distal Femur AP []  Hypo     [x] Normal     [] Hyper [] Hypo     [x] Normal     [] Hyper Normal    Proximal Tibia AP [] Hypo     [x] Normal     [] Hyper [] Hypo     [x] Normal     [] Hyper Normal    Patellar Medial Glide  [] Hypo     [x] Normal     [] Hyper [x] Hypo     [] Normal     [] Hyper Normal    Patellar Lateral Glide  [] Hypo     [x] Normal     [] Hyper [x] Hypo     [] Normal     [] Hyper Normal    Patellar Superior Glide  [x] Hypo     [] Normal     [] Hyper [x] Hypo     [] Normal     [] Hyper Normal    Patellar Inferior Glide  [x] Hypo     [] Normal     [] Hyper [x] Hypo     [] Normal     [] Hyper Normal        Special Tests:     Right   Left  Goal   Anterior Drawer  [] Positive    [x] Negative [] Positive    [x] Negative Negative B    Posterior Drawer [] Positive    [x] Negative [] Positive    [x] Negative Negative B    Varus Stress Test [] Positive    [x] Negative [] Positive    [x] Negative Negative B    Valgus Stress Test [] Positive    [x] Negative [] Positive    [x] Negative Negative B    Jason Test [] Positive    [x] Negative [] Positive    [x] Negative Negative B         Sensation:  [x] Intact to Light Touch   [] Impaired:    Palpation: Increased tone noted with palpation of right quadriceps, hamstrings , and hip adductors . Increased tenderness with palpation of medial knee (near but not specifically MCL).    Girth Right  (12/13/2022) Left  (12/13/2022)   Directly above patella 37.5 cm  36 cm        Posture:  Pt presents with postural abnormalities which include:    [x] Forward Head   [] Increased Lumbar Lordosis   [x] Rounded Shoulder   [] Genu Recurvatum   [] Increased Thoracic Kyphosis [x] Genu Valgus   [] Trunk Deviated    [] Pes Planus   [] Scapular Winging    [] Other:       PT Functional Testing: Gait Analysis: The patient ambulated with the following assistive device: none; the pt presents with the following gait abnormalities: bradykinetic, decreased step length bilateral, decreased knee extension on  right, and genu valgum on right,       Functional Movement  Analysis   Sit to Stand [x]Functional  []Dysfunctional:  []Painful  [x]Non-Painful      Squat [x]Functional  []Dysfunctional:  []Painful  [x]Non-Painful      Step Down  []Functional  [x]Dysfunctional: increased valgus []Painful  [x]Non-Painful            Function:     CMS Impairment/Limitation/Restriction for FOTO Knee Survey    Therapist reviewed FOTO scores for Alma on 12/13/2022.   FOTO documents entered into EPIC - see Media section.    Limitation Score: 44%         TREATMENT     Total Treatment time (time-based codes) separate from Evaluation: (8) minutes     Alma received the treatments listed below:        THERAPEUTIC EXERCISES to develop strength, endurance, ROM, flexibility, posture, and core stabilization for (8) minutes including:    Intervention Performed Today    Hep established and reviewed x See Patient Instructions for details                                        Plan for Next Visit:        PATIENT EDUCATION AND HOME EXERCISES     Education provided:   PURPOSE: Patient educated on the impairments noted above and the effects of physical therapy intervention to improve overall condition and QOL.   EXERCISE: Patient was educated on all the above exercise prior/during/after for proper posture, positioning, and execution for safe performance with home exercise program.   STRENGTH: Patient educated on the importance of improved core and extremity strength in order to improve alignment of the spine and extremities with static positions and dynamic movement.   GAIT & BALANCE: Patient educated on the importance of strong core and lower extremity musculature in order to improve both static and dynamic balance, improve gait mechanics, reduce fall risk and improve household and community mobility.     Written Home Exercises Provided: yes.  Exercises were reviewed and Alma was able to demonstrate them prior to the end of the session.   "Alma demonstrated good  understanding of the education provided. See EMR under Patient Instructions for exercises provided during therapy sessions.    ASSESSMENT     Alma is a 56 y.o. female referred to outpatient Physical Therapy with a medical diagnosis of acute pain of right knee. Pt presents with impairments including: decreased ROM, decreased strength, decreased joint mobility, decreased muscle length, postural abnormalities, gait abnormalities, and decreased overall function.    Pt prognosis is Good.   Pt will benefit from skilled outpatient Physical Therapy to address the deficits stated above and in the chart below, provide pt/family education, and to maximize pt's level of independence.     Plan of care discussed with patient: Yes  Pt's spiritual, cultural and educational needs considered and patient is agreeable to the plan of care and goals as stated below:     Anticipated Barriers for therapy: none    Medical Necessity is demonstrated by the following  History  Co-morbidities and personal factors that may impact the plan of care Co-morbidities:   none    Personal Factors:   []Age   []Education   []Coping style   []Social background   []Lifestyle    []Character   []Attitudes   []Other:   [x]No deficits      [x]Low   []Moderate  []High    Examination  Body Structures and Functions, activity limitations and participation restrictions that may impact the plan of care Body Regions:   []Head  []Neck   []Back   []Trunk  []Upper extremities   [x]Lower extremities   []Other:      Body Systems:    []Gross symmetry   [x]ROM   [x]Strength   []Gross coordinated movement   []Balance   [x]Gait []Transfers  []Transitions   [x]Motor control   []Motor learning   []Scar formation  []Other:       Participation Restrictions:   See above in "Current Level of Function"     Activity limitations:   Learning and applying knowledge  [x]No deficits       General Tasks and Commands  [x]No deficits    Communication  [x]No " deficits    Mobility   []No deficits  []Fine hand use  []Walking   []Driving []Lifting/carrying objects  []Using Transportation   []Moving around using equipment  [x]Other: side to side movements     Self care  [x]No deficits  []Washing oneself   []Caring for body parts   []Toileting   []Dressing  []Eating   []Drinking   []Looking after one's health  []Other:        Domestic Life  []No Deficits  [x]Shopping   [x]Cooking  [x]Doing housework  []Assisting others   []Other:      Interactions/Relationships  [x]No deficits    Life Areas  [x]No deficits    Community and Social Life  [x]Community life  [x]Recreation and leisure   []Shinto and spirituality  []Human rights   []Political life / citizenship  []No deficits      []Low   [x]Moderate  []High    Clinical Presentation [x]Stable and uncomplicated   []Evolving presentation with changing characteristics  []Unstable presentation with unpredictable characteristics [x]Low   []Moderate  []High      Decision Making/ Complexity Score:   [x]Low   []Moderate  []High        Short Term Goals:  6 weeks Status  Date Met   PAIN: Pt will report worst pain of 3/10 in order to progress toward max functional ability and improve quality of life. [x] Progressing  [] Met  [] Not Met    FUNCTION: Patient will demonstrate improved function as indicated by a functional limitation score of less than or equal to 36 out of 100 on FOTO. [x] Progressing  [] Met  [] Not Met    STRENGTH: Patient will improve strength to 50% of stated goals, listed in objective measures above, in order to progress towards independence with functional activities.  [x] Progressing  [] Met  [] Not Met    POSTURE: Patient will correct postural deviations in sitting and standing, to decrease pain and promote long term stability.  [x] Progressing  [] Met  [] Not Met    GAIT: Patient will demonstrate improved gait mechanics in order to improve functional mobility, improve quality of life, and decrease risk of further  injury or fall.  [x] Progressing  [] Met  [] Not Met    HEP: Patient will demonstrate independence with HEP in order to progress toward functional independence. [x] Progressing  [] Met  [] Not Met      Long Term Goals:  12 weeks Status Date Met   PAIN: Pt will report worst pain of 2/10 in order to progress toward max functional ability and improve quality of life [x] Progressing  [] Met  [] Not Met    FUNCTION: Patient will demonstrate improved function as indicated by a functional limitation score of less than or equal to 27 out of 100 on FOTO. [x] Progressing  [] Met  [] Not Met    MOBILITY: Patient will improve AROM to stated goals, listed in objective measures above, in order to return to maximal functional potential and improve quality of life. [x] Progressing  [] Met  [] Not Met    STRENGTH: Patient will improve strength to stated goals, listed in objective measures above, in order to improve functional independence and quality of life. [x] Progressing  [] Met  [] Not Met    GAIT: Patient will demonstrate normalized gait mechanics with minimal compensation in order to return to PLOF. [x] Progressing  [] Met  [] Not Met    Patient will return to normal ADL's, IADL's, community involvement, recreational activities, and work-related activities with less than or equal to 0/10 pain and maximal function.  [x] Progressing  [] Met  [] Not Met    Patient will return to recreational activities such as biking and participating in yoga without pain or limitation.  [x] Progressing  [] Met  [] Not Met      PLAN   Plan of care Certification: 12/13/2022 to 3/13/2023.    Outpatient Physical Therapy 2 times weekly for 12 weeks to include any combination of the following interventions: virtual visits, dry needling, modalities, electrical stimulation (IFC, Pre-Mod, Attended with Functional Dry Needling), Aquatic Therapy, Cervical/Lumbar Traction, Gait Training, Manual Therapy, Neuromuscular Re-ed, Patient Education, Self Care,  Therapeutic Exercise, and Therapeutic Activites     Brittanie Clements, PT, DPT      I CERTIFY THE NEED FOR THESE SERVICES FURNISHED UNDER THIS PLAN OF TREATMENT AND WHILE UNDER MY CARE   Physician's comments:     Physician's Signature: ___________________________________________________

## 2022-12-13 NOTE — PATIENT INSTRUCTIONS
STRAIGHT LEG RAISE FLEXION         While lying on your back, bend one knee and plant your foot flat on the ground. Keep the other knee straight while extending the ankle (bring your toes toward your nose). While keeping the knee extended, slowly raise the leg up until your thighs are even, then lower the leg back down to the table. Throughout this exercise, make sure to brace your abdominal muscles to prevent the low back from arching off of the ground.     Complete 2 sets of 10. Perform 1 times per day            STRAIGHT LEG RAISE ABDUCTION    Start by lying on your side with the bottom leg bent. Make sure the your hips are in a neutral position and not rotated too far backward or forward. Your top leg should be straight and in-line with your body; the toes should be pointed forward the entire time. Slowly raise the top leg to the side, as shown in the image above, then lower back down to a resting position.    Complete 2 sets of 10. Perform 1 times per day            STRAIGHT LEG RAISE ADDUCTION       While lying on your side, place your top leg on the ground in front of you, as shown in the image above. Slowly raise the bottom leg off the ground, as shown in the image above, and then lower back down to a resting position. Keep the bottom knee straight and toes pointed forward the entire time.     Complete 2 sets of 10. Perform 1 times per day            STRAIGHT LEG RAISE EXTENSION      While lying face down with your knee straight, slowly raise one leg off the ground. The knee should remain straight and the front of both hips should maintain contract with the ground throughout the exercise.     Complete 2 sets of 10. Perform 1 times per day      *All exercises listed above obtained from [email or web address]

## 2022-12-16 ENCOUNTER — OFFICE VISIT (OUTPATIENT)
Dept: PULMONOLOGY | Facility: CLINIC | Age: 57
End: 2022-12-16
Payer: COMMERCIAL

## 2022-12-16 VITALS
BODY MASS INDEX: 26.39 KG/M2 | WEIGHT: 139.75 LBS | RESPIRATION RATE: 16 BRPM | DIASTOLIC BLOOD PRESSURE: 76 MMHG | OXYGEN SATURATION: 100 % | HEIGHT: 61 IN | SYSTOLIC BLOOD PRESSURE: 114 MMHG | HEART RATE: 73 BPM

## 2022-12-16 DIAGNOSIS — G47.33 OBSTRUCTIVE SLEEP APNEA: Primary | ICD-10-CM

## 2022-12-16 PROCEDURE — 1160F RVW MEDS BY RX/DR IN RCRD: CPT | Mod: CPTII,S$GLB,, | Performed by: NURSE PRACTITIONER

## 2022-12-16 PROCEDURE — 3044F HG A1C LEVEL LT 7.0%: CPT | Mod: CPTII,S$GLB,, | Performed by: NURSE PRACTITIONER

## 2022-12-16 PROCEDURE — 3074F PR MOST RECENT SYSTOLIC BLOOD PRESSURE < 130 MM HG: ICD-10-PCS | Mod: CPTII,S$GLB,, | Performed by: NURSE PRACTITIONER

## 2022-12-16 PROCEDURE — 1160F PR REVIEW ALL MEDS BY PRESCRIBER/CLIN PHARMACIST DOCUMENTED: ICD-10-PCS | Mod: CPTII,S$GLB,, | Performed by: NURSE PRACTITIONER

## 2022-12-16 PROCEDURE — 3078F DIAST BP <80 MM HG: CPT | Mod: CPTII,S$GLB,, | Performed by: NURSE PRACTITIONER

## 2022-12-16 PROCEDURE — 99999 PR PBB SHADOW E&M-EST. PATIENT-LVL III: ICD-10-PCS | Mod: PBBFAC,,, | Performed by: NURSE PRACTITIONER

## 2022-12-16 PROCEDURE — 1159F PR MEDICATION LIST DOCUMENTED IN MEDICAL RECORD: ICD-10-PCS | Mod: CPTII,S$GLB,, | Performed by: NURSE PRACTITIONER

## 2022-12-16 PROCEDURE — 3008F PR BODY MASS INDEX (BMI) DOCUMENTED: ICD-10-PCS | Mod: CPTII,S$GLB,, | Performed by: NURSE PRACTITIONER

## 2022-12-16 PROCEDURE — 99213 OFFICE O/P EST LOW 20 MIN: CPT | Mod: S$GLB,,, | Performed by: NURSE PRACTITIONER

## 2022-12-16 PROCEDURE — 3078F PR MOST RECENT DIASTOLIC BLOOD PRESSURE < 80 MM HG: ICD-10-PCS | Mod: CPTII,S$GLB,, | Performed by: NURSE PRACTITIONER

## 2022-12-16 PROCEDURE — 99213 PR OFFICE/OUTPT VISIT, EST, LEVL III, 20-29 MIN: ICD-10-PCS | Mod: S$GLB,,, | Performed by: NURSE PRACTITIONER

## 2022-12-16 PROCEDURE — 99999 PR PBB SHADOW E&M-EST. PATIENT-LVL III: CPT | Mod: PBBFAC,,, | Performed by: NURSE PRACTITIONER

## 2022-12-16 PROCEDURE — 1159F MED LIST DOCD IN RCRD: CPT | Mod: CPTII,S$GLB,, | Performed by: NURSE PRACTITIONER

## 2022-12-16 PROCEDURE — 3008F BODY MASS INDEX DOCD: CPT | Mod: CPTII,S$GLB,, | Performed by: NURSE PRACTITIONER

## 2022-12-16 PROCEDURE — 3074F SYST BP LT 130 MM HG: CPT | Mod: CPTII,S$GLB,, | Performed by: NURSE PRACTITIONER

## 2022-12-16 PROCEDURE — 3044F PR MOST RECENT HEMOGLOBIN A1C LEVEL <7.0%: ICD-10-PCS | Mod: CPTII,S$GLB,, | Performed by: NURSE PRACTITIONER

## 2022-12-16 NOTE — ASSESSMENT & PLAN NOTE
Benefits and compliant with Auto CPAP  5-20 cm with optimal control AHI 2.0  Full face mask  REGULOE: [facility]   12/16/2022 changed in clinic to Auto CPAP 6-12  cm since patient complaining of mask leak at times and wanted more air at start up.   Follow up 10 weeks CPAP download

## 2022-12-16 NOTE — PROGRESS NOTES
Subjective:      Patient ID: Alma Vivar is a 56 y.o. female.    Patient Active Problem List   Diagnosis    Left hand weakness    Swelling of left hand    Obstructive sleep apnea    Allergic rhinitis    Acute pain of right knee    Decreased range of motion of right knee    Decreased strength, endurance, and mobility       she has been referred by No ref. provider found for evaluation and management for   Chief Complaint   Patient presents with    Sleep Apnea       Chief Complaint: Sleep Apnea        HPI:    HPI: Alma Vivar is here for follow up for COLTEN and CPAP complaince assessment.   She is on Auto CPAP of 5-20 cmH2O pressure which is optimally controlling sleep apnea with apneic index (AHI) 2.0 events an hour.   She is compliant with CPAP use. Complaince download today reveals 87% of days with greater than 4 hours of device use.   Patient reports benefit from CPAP use and denies snoring and excessive daytime sleepiness.  Patient reports complaint of mask leak and would like more air at start up . Full face mask is used.      [facility]AREN [facility] AutoPAP--No Modem  Set Up Date: 10/4/22    8/14/2022 Home Sleep Study  1 night study MILD/BORDERLINE OBSTRUCTIVE SLEEP APNEA with overall AHI 9.3/hr ( 69 events): night #1. Oxygen desaturation: < 70 %. SpO2 between 90% to 94% for 1 hr 14 min.    12/16/2022 changed in clinic to Auto CPAP 6-12  cm since patient complaining of mask leak at times and wanted more air at start up.         [place] Sleepiness Scale   [facility] SLEEPINESS SCALE 12/16/2022 9/23/2022   Sitting and reading 0 0   Watching TV 0 1   Sitting, inactive in a public place (e.g. a theatre or a meeting) 0 0   As a passenger in a car for an hour without a break 2 3   Lying down to rest in the afternoon when circumstances permit 0 3   Sitting and talking to someone 0 0   Sitting quietly after a lunch without alcohol 0 0   In a car, while stopped for a few minutes in traffic 0 0   Total  score 2 7     Previous Report Reviewed: lab reports and office notes     Past Medical History: The following portions of the patient's history were reviewed and updated as appropriate:   She  has a past surgical history that includes Hysterectomy and  section.  Her family history includes Bipolar disorder in her father; Cancer in her maternal grandmother; Cirrhosis in her father; Depression in her paternal grandmother; Hypertension in her mother; Kidney failure in her father; Mental illness in her father; Ovarian cancer in her maternal grandmother.  She  reports that she has never smoked. She has never used smokeless tobacco. She reports current alcohol use of about 1.0 standard drink per week. She reports that she does not use drugs.  She has a current medication list which includes the following prescription(s): fexofenadine and epinephrine.  She is allergic to tomato (solanum lycopersicum), cpd vehicle sol.sugarfree no.1, shellfish containing products, sulfites, tomato, and sulfa (sulfonamide antibiotics)..    Review of Systems   Constitutional:  Negative for fever, chills, weight loss, weight gain, activity change, appetite change, fatigue and night sweats.   HENT:  Negative for postnasal drip, rhinorrhea, sinus pressure, voice change and congestion.    Eyes:  Negative for redness and itching.   Respiratory:  Negative for apnea, snoring, cough, sputum production, chest tightness, shortness of breath, wheezing, orthopnea, asthma nighttime symptoms, dyspnea on extertion, use of rescue inhaler and somnolence.    Cardiovascular: Negative.  Negative for chest pain, palpitations and leg swelling.   Genitourinary:  Negative for difficulty urinating and hematuria.   Endocrine:  Negative for cold intolerance and heat intolerance.    Musculoskeletal:  Negative for arthralgias, gait problem, joint swelling and myalgias.   Skin: Negative.    Gastrointestinal:  Negative for nausea, vomiting, abdominal pain and acid  "reflux.   Neurological:  Negative for dizziness, weakness, light-headedness and headaches.   Hematological:  Negative for adenopathy. No excessive bruising.   All other systems reviewed and are negative.   Objective:   /76   Pulse 73   Resp 16   Ht 5' 1" (1.549 m)   Wt 63.4 kg (139 lb 12.4 oz)   SpO2 100%   BMI 26.41 kg/m²   Physical Exam  Vitals and nursing note reviewed.   Constitutional:       General: She is not in acute distress.     Appearance: Normal appearance. She is well-developed. She is not ill-appearing or toxic-appearing.   HENT:      Head: Normocephalic.      Right Ear: External ear normal.      Left Ear: External ear normal.      Nose: Nose normal.      Mouth/Throat:      Pharynx: No oropharyngeal exudate.   Eyes:      Conjunctiva/sclera: Conjunctivae normal.   Cardiovascular:      Rate and Rhythm: Normal rate and regular rhythm.      Heart sounds: Normal heart sounds.   Pulmonary:      Effort: Pulmonary effort is normal.      Breath sounds: Normal breath sounds. No stridor.   Abdominal:      Palpations: Abdomen is soft.   Musculoskeletal:         General: Normal range of motion.      Cervical back: Normal range of motion and neck supple.   Lymphadenopathy:      Cervical: No cervical adenopathy.   Skin:     General: Skin is warm and dry.   Neurological:      Mental Status: She is alert and oriented to person, place, and time.   Psychiatric:         Behavior: Behavior normal. Behavior is cooperative.         Thought Content: Thought content normal.         Judgment: Judgment normal.       Personal Diagnostic Review  Review of labs, xray's, cardiology reports.     Assessment:     1. Obstructive sleep apnea        Orders Placed This Encounter   Procedures    HME - OTHER     Changed in clinic to auto CPAP 6-12 cm     Order Specific Question:   Type of Equipment:     Answer:   CPAP     Order Specific Question:   Height:     Answer:   5' 1" (1.549 m)     Order Specific Question:   Weight:     " Answer:   63.4 kg (139 lb 12.4 oz)     Order Specific Question:   Does patient have medical equipment at home?     Answer:   CPAP     Plan:   Discussed diagnosis, its evaluation, treatment and usual course. All questions answered.  Problem List Items Addressed This Visit       Obstructive sleep apnea - Primary     Benefits and compliant with Auto CPAP  5-20 cm with optimal control AHI 2.0  Full face mask  HME: [facility]   12/16/2022 changed in clinic to Auto CPAP 6-12  cm since patient complaining of mask leak at times and wanted more air at start up.   Follow up 10 weeks CPAP download         Relevant Orders    HME - OTHER     I spent a total of 21 minutes on the day of the visit.  This includes face to face time and non-face to face time preparing to see the patient (eg, review of tests), obtaining and/or reviewing separately obtained history, documenting clinical information in the electronic or other health record, independently interpreting results and communicating results to the patient/family/caregiver, or care coordinator.    Follow up in about 10 weeks (around 2/24/2023) for cpap download .    Thank you for the opportunity to participate in the care of this patient.

## 2022-12-23 ENCOUNTER — CLINICAL SUPPORT (OUTPATIENT)
Dept: REHABILITATION | Facility: HOSPITAL | Age: 57
End: 2022-12-23
Payer: COMMERCIAL

## 2022-12-23 DIAGNOSIS — M25.561 ACUTE PAIN OF RIGHT KNEE: Primary | ICD-10-CM

## 2022-12-23 DIAGNOSIS — R53.1 DECREASED STRENGTH, ENDURANCE, AND MOBILITY: ICD-10-CM

## 2022-12-23 DIAGNOSIS — Z74.09 DECREASED STRENGTH, ENDURANCE, AND MOBILITY: ICD-10-CM

## 2022-12-23 DIAGNOSIS — M25.661 DECREASED RANGE OF MOTION OF RIGHT KNEE: ICD-10-CM

## 2022-12-23 DIAGNOSIS — R68.89 DECREASED STRENGTH, ENDURANCE, AND MOBILITY: ICD-10-CM

## 2022-12-23 PROCEDURE — 97110 THERAPEUTIC EXERCISES: CPT | Performed by: PHYSICAL THERAPIST

## 2022-12-23 PROCEDURE — 97112 NEUROMUSCULAR REEDUCATION: CPT | Performed by: PHYSICAL THERAPIST

## 2022-12-23 PROCEDURE — 97140 MANUAL THERAPY 1/> REGIONS: CPT | Performed by: PHYSICAL THERAPIST

## 2022-12-23 NOTE — PROGRESS NOTES
[facility]   Physical Therapy Treatment Note     Name: Alma Alfonso [place]  Clinic Number: [id number]    Therapy Diagnosis:   Encounter Diagnoses   Name Primary?    Acute pain of right knee Yes    Decreased range of motion of right knee     Decreased strength, endurance, and mobility      Physician: Candis Romo PA-C    Visit Date: 12/23/2022    Physician Orders: PT Eval and Treat  Medical Diagnosis from Referral: acute pain of right knee  Evaluation Date: 12/13/2022  Authorization Period Expiration: 12/31/2022  Plan of Care Expiration: 3/13/2023  Progress Note Due: 1/12/2023  Visit # / Visits authorized: 1/25 (+eval)   FOTO: 1/3 (last performed on 12/13/2022)     Precautions: Standard    PTA Visit #: 0/5     Time In: 0752  Time Out: 0835  Total Billable Time: 40 minutes     SUBJECTIVE     Patient reports: feeling okay today. She states that she actually noticed that her left leg seemed weaker than the right knee that typically bothers her.     He/She was compliant with home exercise program.  Response to previous treatment: no adverse reaction  Functional change: none noted yet    Pain: 0/10     Location: right knee    OBJECTIVE     Objective Measures updated at progress report only unless specified.       TREATMENT     Alma received the treatments listed below:     MANUAL THERAPY TECHNIQUES were applied for (10) minutes, including:    Manual Intervention Performed Today    Soft Tissue Mobilization [x] right quadriceps, medial hamstrings, and hip adductors    Joint Mobilizations []     []     []    Functional Dry Needling  []      Plan for Next Visit: Continue as needed         THERAPEUTIC EXERCISES to develop strength, endurance, ROM, flexibility, posture, and core stabilization for (26) minutes including:    Intervention Performed Today    Upright bike for ROM and strength x 5', level PRN   SLR - flexion and abduction x 2 x 10 each LE   bridges x 3 x 10   LAQ x 3 x 10, each  LE                         Plan for Next Visit:          NEUROMUSCULAR RE-EDUCATION ACTIVITIES to improve Balance, Coordination, Kinesthetic, Sense, Proprioception, and Posture for (8) minutes.  The following were included:    Intervention Performed Today    Standing TKE x 30x, each LE, purple band   Standing hip 3-way x 15x each LE                                   Plan for Next Visit:        PATIENT EDUCATION AND HOME EXERCISES     Home Exercises Provided and Patient Education Provided     Education provided:   PURPOSE: Patient educated on the impairments noted above and the effects of physical therapy intervention to improve overall condition and QOL.   EXERCISE: Patient was educated on all the above exercise prior/during/after for proper posture, positioning, and execution for safe performance with home exercise program.   STRENGTH: Patient educated on the importance of improved core and extremity strength in order to improve alignment of the spine and extremities with static positions and dynamic movement.     Written Home Exercises Provided: yes.  Exercises were reviewed and Alma was able to demonstrate them prior to the end of the session.  Alma demonstrated good  understanding of the education provided. See EMR under Patient Instructions for exercises provided during therapy sessions.    ASSESSMENT     Patient tolerated treatment well. She completed exercises without increased complaint. Soft tissue adhesions present along right adductor and medial hamstring musculature, which improved with manual therapy. Discussed different ways to perform adductor stretch as part of HEP. Patient would benefit from continued strengthening and stabilization.     Alma is progressing well towards her goals.   Pt prognosis is Good.     Pt will continue to benefit from skilled outpatient physical therapy to address the deficits listed in the problem list box on initial evaluation, provide pt/family education and  to maximize pt's level of independence in the home and community environment.     Pt's spiritual, cultural and educational needs considered and pt agreeable to plan of care and goals.     Anticipated Barriers for therapy: none    Goals:  Short Term Goals:  6 weeks Status  Date Met   PAIN: Pt will report worst pain of 3/10 in order to progress toward max functional ability and improve quality of life. [x] Progressing  [] Met  [] Not Met     FUNCTION: Patient will demonstrate improved function as indicated by a functional limitation score of less than or equal to 36 out of 100 on FOTO. [x] Progressing  [] Met  [] Not Met     STRENGTH: Patient will improve strength to 50% of stated goals, listed in objective measures above, in order to progress towards independence with functional activities.  [x] Progressing  [] Met  [] Not Met     POSTURE: Patient will correct postural deviations in sitting and standing, to decrease pain and promote long term stability.  [x] Progressing  [] Met  [] Not Met     GAIT: Patient will demonstrate improved gait mechanics in order to improve functional mobility, improve quality of life, and decrease risk of further injury or fall.  [x] Progressing  [] Met  [] Not Met     HEP: Patient will demonstrate independence with HEP in order to progress toward functional independence. [x] Progressing  [] Met  [] Not Met        Long Term Goals:  12 weeks Status Date Met   PAIN: Pt will report worst pain of 2/10 in order to progress toward max functional ability and improve quality of life [x] Progressing  [] Met  [] Not Met     FUNCTION: Patient will demonstrate improved function as indicated by a functional limitation score of less than or equal to 27 out of 100 on FOTO. [x] Progressing  [] Met  [] Not Met     MOBILITY: Patient will improve AROM to stated goals, listed in objective measures above, in order to return to maximal functional potential and improve quality of life. [x] Progressing  []  Met  [] Not Met     STRENGTH: Patient will improve strength to stated goals, listed in objective measures above, in order to improve functional independence and quality of life. [x] Progressing  [] Met  [] Not Met     GAIT: Patient will demonstrate normalized gait mechanics with minimal compensation in order to return to PLOF. [x] Progressing  [] Met  [] Not Met     Patient will return to normal ADL's, IADL's, community involvement, recreational activities, and work-related activities with less than or equal to 0/10 pain and maximal function.  [x] Progressing  [] Met  [] Not Met     Patient will return to recreational activities such as biking and participating in yoga without pain or limitation.  [x] Progressing  [] Met  [] Not Met         PLAN     Continue Plan of Care (POC) and progress per patient tolerance. See treatment section for details on planned progressions next session.    12/13/2022 (evaluation): Outpatient Physical Therapy 2 times weekly for 12 weeks to include any combination of the following interventions: virtual visits, dry needling, modalities, electrical stimulation (IFC, Pre-Mod, Attended with Functional Dry Needling), Aquatic Therapy, Cervical/Lumbar Traction, Gait Training, Manual Therapy, Neuromuscular Re-ed, Patient Education, Self Care, Therapeutic Exercise, and Therapeutic Activites     Brittanie Clements, PT

## 2023-01-04 ENCOUNTER — CLINICAL SUPPORT (OUTPATIENT)
Dept: REHABILITATION | Facility: HOSPITAL | Age: 58
End: 2023-01-04
Payer: COMMERCIAL

## 2023-01-04 DIAGNOSIS — R68.89 DECREASED STRENGTH, ENDURANCE, AND MOBILITY: ICD-10-CM

## 2023-01-04 DIAGNOSIS — R53.1 DECREASED STRENGTH, ENDURANCE, AND MOBILITY: ICD-10-CM

## 2023-01-04 DIAGNOSIS — M25.561 ACUTE PAIN OF RIGHT KNEE: Primary | ICD-10-CM

## 2023-01-04 DIAGNOSIS — Z74.09 DECREASED STRENGTH, ENDURANCE, AND MOBILITY: ICD-10-CM

## 2023-01-04 DIAGNOSIS — M25.661 DECREASED RANGE OF MOTION OF RIGHT KNEE: ICD-10-CM

## 2023-01-04 PROCEDURE — 97110 THERAPEUTIC EXERCISES: CPT

## 2023-01-04 PROCEDURE — 97140 MANUAL THERAPY 1/> REGIONS: CPT

## 2023-01-04 PROCEDURE — 97112 NEUROMUSCULAR REEDUCATION: CPT

## 2023-01-06 ENCOUNTER — LAB VISIT (OUTPATIENT)
Dept: LAB | Facility: HOSPITAL | Age: 58
End: 2023-01-06
Attending: STUDENT IN AN ORGANIZED HEALTH CARE EDUCATION/TRAINING PROGRAM
Payer: COMMERCIAL

## 2023-01-06 ENCOUNTER — CLINICAL SUPPORT (OUTPATIENT)
Dept: REHABILITATION | Facility: HOSPITAL | Age: 58
End: 2023-01-06
Payer: COMMERCIAL

## 2023-01-06 DIAGNOSIS — R53.1 DECREASED STRENGTH, ENDURANCE, AND MOBILITY: ICD-10-CM

## 2023-01-06 DIAGNOSIS — E78.00 ELEVATED LDL CHOLESTEROL LEVEL: ICD-10-CM

## 2023-01-06 DIAGNOSIS — R68.89 DECREASED STRENGTH, ENDURANCE, AND MOBILITY: ICD-10-CM

## 2023-01-06 DIAGNOSIS — M25.561 ACUTE PAIN OF RIGHT KNEE: Primary | ICD-10-CM

## 2023-01-06 DIAGNOSIS — M25.661 DECREASED RANGE OF MOTION OF RIGHT KNEE: ICD-10-CM

## 2023-01-06 DIAGNOSIS — Z74.09 DECREASED STRENGTH, ENDURANCE, AND MOBILITY: ICD-10-CM

## 2023-01-06 LAB
CHOLEST SERPL-MCNC: 213 MG/DL (ref 120–199)
CHOLEST/HDLC SERPL: 3.9 {RATIO} (ref 2–5)
HDLC SERPL-MCNC: 54 MG/DL (ref 40–75)
HDLC SERPL: 25.4 % (ref 20–50)
LDLC SERPL CALC-MCNC: 130.6 MG/DL (ref 63–159)
NONHDLC SERPL-MCNC: 159 MG/DL
TRIGL SERPL-MCNC: 142 MG/DL (ref 30–150)

## 2023-01-06 PROCEDURE — 80061 LIPID PANEL: CPT | Performed by: STUDENT IN AN ORGANIZED HEALTH CARE EDUCATION/TRAINING PROGRAM

## 2023-01-06 PROCEDURE — 97140 MANUAL THERAPY 1/> REGIONS: CPT | Performed by: PHYSICAL THERAPIST

## 2023-01-06 PROCEDURE — 97112 NEUROMUSCULAR REEDUCATION: CPT | Performed by: PHYSICAL THERAPIST

## 2023-01-06 PROCEDURE — 97110 THERAPEUTIC EXERCISES: CPT | Performed by: PHYSICAL THERAPIST

## 2023-01-06 PROCEDURE — 36415 COLL VENOUS BLD VENIPUNCTURE: CPT | Mod: PO | Performed by: STUDENT IN AN ORGANIZED HEALTH CARE EDUCATION/TRAINING PROGRAM

## 2023-01-06 NOTE — PATIENT INSTRUCTIONS
Toe Series - Toe Yoga        Sit with knee stacked above ankle. Maintain the ball of the foot and heel on the floor the entire exercise.    1) Lift the big toe, keeping the little toes planted on the floor. Hold for 5 seconds  2) Lift the little toes, keeping the big toe planted on the floor. Hold for 5 seconds  3) Time yourself for 3 minutes, alternating positions 1 & 2 back and forth throughout that time.      Toe Series - Abduction/Adduction          Sit with knee stacked above ankle    1) Abduction - Spread toes as far apart from each other as possible. The toes may lift off the floor. Hold for seconds  2) Adduction - Push toes together. Hold for 5 seconds  3) Time yourself for 3 minutes, alternating positions 1 & 2 back and forth throughout that time.      Toe Series - Flexion/Extension        Sit with knee stacked above ankle    1) Flexion - Curl toes down toward floor. Hold for 5 seconds  2) Extension - Lift toes up toward ceiling. Hold for 5 seconds  3) Time yourself for 3 minutes, alternating positions 1 & 2 back and forth throughout that time.  4) Place towel roll under ball of foot, but keep heel on the ground.      Arch Raise - HOLD FOR NOW        1) Sit in a chair with both feet placed flat on the floor   2) Raise the arch of your foot by sliding your big toe toward your heel without curling your toes or lifting your heel S  3) Hold the position for 3 seconds then relax and repeat  4) Time yourself for 3 minutes, relaxing and repeating throughout that time.  5) Variations can be performed by moving the feet farther away from you or turning the foot inward or outward to challenge the muscles from different positions.  6) Once you feel comfortable performing the short foot movement you can gradually progress to performing the exercise while standing and then eventually from a single-leg standing position.      CALF STRETCH WITH TOWEL - GASTROCNEMIUS        While in a seated position, place a towel around  the ball of your foot and pull your ankle back until a stretch is felt on your calf area.    Keep your knee in a straightened position during the stretch.    Hold stretch for 30 seconds and repeat 3 times on each side, 1 times per day       CALF STRETCH WITH TOWEL - SOLEUS        While in a seated position, place a towel around the ball of your foot and pull your ankle back until a stretch is felt on your calf area.    Keep your knee in a bent position during the stretch.    Hold stretch for 30 seconds and repeat 3 times on each side, 1 times per day       BIG TOE AND PLANTAR FASCIA STRETCH        While seated, place your affected ankle on top of your other leg.    Then grab your toes and bend them back into extension as shown, especially focusing on the big toe. Hold for a gentle stretch to your toes and sole of the foot (plantar fascia).      Hold stretch for 30 seconds and repeat 3 times on each side, 2 times per day       *All exercises listed above obtained from [email or web address]

## 2023-01-06 NOTE — PROGRESS NOTES
[facility]   Physical Therapy Treatment Note     Name: Alma Alfonso [place]  Clinic Number: [id number]    Therapy Diagnosis:   Encounter Diagnoses   Name Primary?    Acute pain of right knee Yes    Decreased range of motion of right knee     Decreased strength, endurance, and mobility        Physician: Candis Romo PA-C    Visit Date: 1/6/2023    Physician Orders: PT Eval and Treat  Medical Diagnosis from Referral: acute pain of right knee  Evaluation Date: 12/13/2022  Authorization Period Expiration: 12/31/2022  Plan of Care Expiration: 3/13/2023  Progress Note Due: 1/12/2023  Visit # / Visits authorized: 3/25 (+eval)   FOTO: 1/3 (last performed on 12/13/2022)     Precautions: Standard    PTA Visit #: 0/5     Time In: 0747  Time Out: 0832  Total Billable Time: 42 minutes (denotes billable time)    SUBJECTIVE     Patient reports: that her knee is actually feeling good, but her plantar fasciitis is still flared up. She states that she thinks this is due to doing too much during the holidays. She has had it before in the past.     She was compliant with home exercise program.  Response to previous treatment: no adverse reaction  Functional change: none noted yet    Pain: 3/10   (bilateral plantar fascia)  Location: right knee (0/10)    OBJECTIVE     Objective Measures updated at progress report only unless specified.     TREATMENT     Alma received the treatments listed below:     MANUAL THERAPY TECHNIQUES were applied for (12) minutes, including:    Manual Intervention Performed Today    Soft Tissue Mobilization [x] right quadriceps, medial hamstrings, and hip adductors , bilateral plantar fascia    Joint Mobilizations []     []     []    Functional Dry Needling  []      Plan for Next Visit: Continue as needed     THERAPEUTIC EXERCISES to develop strength, endurance, ROM, flexibility, posture, and core stabilization for (15) minutes including:    Intervention Performed Today     Upright bike for ROM and strength x 5 minues, level 6   SLR - flexion, abduction, circles forward/backward x x 15 each LE 2 lbs      bridges x 3 x 10 with theraball    LAQ  3 x 10, each LE   Shuttle  x Double Leg 5 bands 3x10 reps                       Plan for Next Visit:      NEUROMUSCULAR RE-EDUCATION ACTIVITIES to improve Balance, Coordination, Kinesthetic, Sense, Proprioception, and Posture for (15) minutes.  The following were included:    Intervention Performed Today    Standing TKE x 30x, each LE, purple band   Standing hip 3-way x 15x each LE                  Updated HEP x    Towel Curls   X20 reps    Toe Yoga   X15 reps each direction B requiring UE use for overpressure      Plan for Next Visit: sumo walks, monster walks, RDL       PATIENT EDUCATION AND HOME EXERCISES     Home Exercises Provided and Patient Education Provided     Education provided:   PURPOSE: Patient educated on the impairments noted above and the effects of physical therapy intervention to improve overall condition and QOL.   EXERCISE: Patient was educated on all the above exercise prior/during/after for proper posture, positioning, and execution for safe performance with home exercise program.   STRENGTH: Patient educated on the importance of improved core and extremity strength in order to improve alignment of the spine and extremities with static positions and dynamic movement.     Written Home Exercises Provided: yes.  Exercises were reviewed and Alma was able to demonstrate them prior to the end of the session.  Alma demonstrated good  understanding of the education provided. See EMR under Patient Instructions for exercises provided during therapy sessions.    ASSESSMENT     Patient did well with treatment today and completed exercises without increased complaint. She is appropriate to progress again next visit for additional knee and hip strengthening and stabilization, as long as it does not increase foot pain. Updated  foot intrinsic HEP as well as adding stretches to HEP. Patient expressed understanding of each.     Alma is progressing well towards her goals.   Pt prognosis is Good.     Pt will continue to benefit from skilled outpatient physical therapy to address the deficits listed in the problem list box on initial evaluation, provide pt/family education and to maximize pt's level of independence in the home and community environment.     Pt's spiritual, cultural and educational needs considered and pt agreeable to plan of care and goals.     Anticipated Barriers for therapy: none    Goals:  Short Term Goals:  6 weeks Status  Date Met   PAIN: Pt will report worst pain of 3/10 in order to progress toward max functional ability and improve quality of life. [x] Progressing  [] Met  [] Not Met     FUNCTION: Patient will demonstrate improved function as indicated by a functional limitation score of less than or equal to 36 out of 100 on FOTO. [x] Progressing  [] Met  [] Not Met     STRENGTH: Patient will improve strength to 50% of stated goals, listed in objective measures above, in order to progress towards independence with functional activities.  [x] Progressing  [] Met  [] Not Met     POSTURE: Patient will correct postural deviations in sitting and standing, to decrease pain and promote long term stability.  [x] Progressing  [] Met  [] Not Met     GAIT: Patient will demonstrate improved gait mechanics in order to improve functional mobility, improve quality of life, and decrease risk of further injury or fall.  [x] Progressing  [] Met  [] Not Met     HEP: Patient will demonstrate independence with HEP in order to progress toward functional independence. [x] Progressing  [] Met  [] Not Met        Long Term Goals:  12 weeks Status Date Met   PAIN: Pt will report worst pain of 2/10 in order to progress toward max functional ability and improve quality of life [x] Progressing  [] Met  [] Not Met     FUNCTION: Patient will  demonstrate improved function as indicated by a functional limitation score of less than or equal to 27 out of 100 on FOTO. [x] Progressing  [] Met  [] Not Met     MOBILITY: Patient will improve AROM to stated goals, listed in objective measures above, in order to return to maximal functional potential and improve quality of life. [x] Progressing  [] Met  [] Not Met     STRENGTH: Patient will improve strength to stated goals, listed in objective measures above, in order to improve functional independence and quality of life. [x] Progressing  [] Met  [] Not Met     GAIT: Patient will demonstrate normalized gait mechanics with minimal compensation in order to return to PLOF. [x] Progressing  [] Met  [] Not Met     Patient will return to normal ADL's, IADL's, community involvement, recreational activities, and work-related activities with less than or equal to 0/10 pain and maximal function.  [x] Progressing  [] Met  [] Not Met     Patient will return to recreational activities such as biking and participating in yoga without pain or limitation.  [x] Progressing  [] Met  [] Not Met         PLAN     Continue Plan of Care (POC) and progress per patient tolerance. See treatment section for details on planned progressions next session.    12/13/2022 (evaluation): Outpatient Physical Therapy 2 times weekly for 12 weeks to include any combination of the following interventions: virtual visits, dry needling, modalities, electrical stimulation (IFC, Pre-Mod, Attended with Functional Dry Needling), Aquatic Therapy, Cervical/Lumbar Traction, Gait Training, Manual Therapy, Neuromuscular Re-ed, Patient Education, Self Care, Therapeutic Exercise, and Therapeutic Activites     Brittanie Clements, PT

## 2023-01-10 ENCOUNTER — CLINICAL SUPPORT (OUTPATIENT)
Dept: REHABILITATION | Facility: HOSPITAL | Age: 58
End: 2023-01-10
Payer: COMMERCIAL

## 2023-01-10 DIAGNOSIS — M25.561 ACUTE PAIN OF RIGHT KNEE: Primary | ICD-10-CM

## 2023-01-10 DIAGNOSIS — R53.1 DECREASED STRENGTH, ENDURANCE, AND MOBILITY: ICD-10-CM

## 2023-01-10 DIAGNOSIS — R68.89 DECREASED STRENGTH, ENDURANCE, AND MOBILITY: ICD-10-CM

## 2023-01-10 DIAGNOSIS — M25.661 DECREASED RANGE OF MOTION OF RIGHT KNEE: ICD-10-CM

## 2023-01-10 DIAGNOSIS — Z74.09 DECREASED STRENGTH, ENDURANCE, AND MOBILITY: ICD-10-CM

## 2023-01-10 PROCEDURE — 97140 MANUAL THERAPY 1/> REGIONS: CPT | Performed by: PHYSICAL THERAPIST

## 2023-01-10 PROCEDURE — 97112 NEUROMUSCULAR REEDUCATION: CPT | Performed by: PHYSICAL THERAPIST

## 2023-01-10 PROCEDURE — 97110 THERAPEUTIC EXERCISES: CPT | Performed by: PHYSICAL THERAPIST

## 2023-01-10 NOTE — PROGRESS NOTES
[facility]   Physical Therapy Treatment Note     Name: Alma Alfonso [place]  Clinic Number: [id number]    Therapy Diagnosis:   Encounter Diagnoses   Name Primary?    Acute pain of right knee Yes    Decreased range of motion of right knee     Decreased strength, endurance, and mobility        Physician: Candis Romo PA-C    Visit Date: 1/10/2023    Physician Orders: PT Eval and Treat  Medical Diagnosis from Referral: acute pain of right knee  Evaluation Date: 12/13/2022  Authorization Period Expiration: 12/31/2022  Plan of Care Expiration: 3/13/2023  Progress Note Due: 1/12/2023  Visit # / Visits authorized: 4/25 (+eval)   FOTO: 1/3 (last performed on 12/13/2022)     Precautions: Standard    PTA Visit #: 0/5     Time In: 1731  Time Out: 1820  Total Billable Time: 45 minutes (denotes billable time)    SUBJECTIVE     Patient reports: that her right foot really started hurting her yesterday and is still very bad today. Patient reports that on Sunday she rode her stationary bike for 15 minutes and felt fine. She did her yoga on the [employer] bike, and she did her regular PT HEP. Patient reports that she did increase ankle weights with leg raises. She states that later that evening her right knee started aching. It hasn't progressed to pain, but the ache is still with her. She isn't even sure if it's a bad thing, but she is not sure since there was no discomfort before but there was after.     She was compliant with home exercise program.  Response to previous treatment: no adverse reaction  Functional change: none noted yet    Pain: 6/10  (right plantar fascia)  Location: right knee (0/10)    OBJECTIVE     Objective Measures updated at progress report only unless specified.     TREATMENT     Alma received the treatments listed below:     MANUAL THERAPY TECHNIQUES were applied for (12) minutes, including:    Manual Intervention Performed Today    Soft Tissue Mobilization []  [x] right  "quadriceps, medial hamstrings, and hip adductors ,   right plantar fascia    Joint Mobilizations []     []     []    Functional Dry Needling  []      Plan for Next Visit: Continue as needed     THERAPEUTIC EXERCISES to develop strength, endurance, ROM, flexibility, posture, and core stabilization for (8) minutes including:    Intervention Performed Today    Upright bike for ROM and strength x 5 minues, level 6   SLR - flexion, abduction, circles forward/backward  x 15 each LE 2 lbs      bridges x 3 x 10 with theraball    LAQ  3 x 10, each LE   Shuttle   Double Leg 5 bands 3x10 reps                       Plan for Next Visit:      NEUROMUSCULAR RE-EDUCATION ACTIVITIES to improve Balance, Coordination, Kinesthetic, Sense, Proprioception, and Posture for (25) minutes.  The following were included:    Intervention Performed Today    Standing TKE x 30x, each LE, purple band   Standing hip 3-way x 15x each LE   Step downs x 4" step, 1 x 10, 1 x 15 each LE   Sumo walks x Down and back 3x along mirror on turf, red theraband (12' one way)   Monster walks x Down and back 3x along mirror on turf, Red theraband   Updated HEP x    Towel Curls   X20 reps    Toe Yoga   X15 reps each direction B requiring UE use for overpressure      Plan for Next Visit: sumo walks, monster walks, RDL       PATIENT EDUCATION AND HOME EXERCISES     Home Exercises Provided and Patient Education Provided     Education provided:   PURPOSE: Patient educated on the impairments noted above and the effects of physical therapy intervention to improve overall condition and QOL.   EXERCISE: Patient was educated on all the above exercise prior/during/after for proper posture, positioning, and execution for safe performance with home exercise program.   STRENGTH: Patient educated on the importance of improved core and extremity strength in order to improve alignment of the spine and extremities with static positions and dynamic movement.     Written Home " Exercises Provided: yes.  Exercises were reviewed and Alma was able to demonstrate them prior to the end of the session.  Alma demonstrated good  understanding of the education provided. See EMR under Patient Instructions for exercises provided during therapy sessions.    ASSESSMENT     Patient tolerated treatment well, despite initial complaints of pain. She requested to be progressed and would let us know if she experienced any knee pain with progressions. Patient tolerated standing, more functional progressions well. She demonstrated good overall form, requiring only minimal cues throughout activities. Significant soft tissue adhesions noted along right plantar fascia this visit, which improved following manual therapy. Slight improvement in gait, including right heel strike and toe off noted upon exiting treatment today.     Alma is progressing well towards her goals.   Pt prognosis is Good.     Pt will continue to benefit from skilled outpatient physical therapy to address the deficits listed in the problem list box on initial evaluation, provide pt/family education and to maximize pt's level of independence in the home and community environment.     Pt's spiritual, cultural and educational needs considered and pt agreeable to plan of care and goals.     Anticipated Barriers for therapy: none    Goals:  Short Term Goals:  6 weeks Status  Date Met   PAIN: Pt will report worst pain of 3/10 in order to progress toward max functional ability and improve quality of life. [x] Progressing  [] Met  [] Not Met     FUNCTION: Patient will demonstrate improved function as indicated by a functional limitation score of less than or equal to 36 out of 100 on FOTO. [x] Progressing  [] Met  [] Not Met     STRENGTH: Patient will improve strength to 50% of stated goals, listed in objective measures above, in order to progress towards independence with functional activities.  [x] Progressing  [] Met  [] Not Met      POSTURE: Patient will correct postural deviations in sitting and standing, to decrease pain and promote long term stability.  [x] Progressing  [] Met  [] Not Met     GAIT: Patient will demonstrate improved gait mechanics in order to improve functional mobility, improve quality of life, and decrease risk of further injury or fall.  [x] Progressing  [] Met  [] Not Met     HEP: Patient will demonstrate independence with HEP in order to progress toward functional independence. [x] Progressing  [] Met  [] Not Met        Long Term Goals:  12 weeks Status Date Met   PAIN: Pt will report worst pain of 2/10 in order to progress toward max functional ability and improve quality of life [x] Progressing  [] Met  [] Not Met     FUNCTION: Patient will demonstrate improved function as indicated by a functional limitation score of less than or equal to 27 out of 100 on FOTO. [x] Progressing  [] Met  [] Not Met     MOBILITY: Patient will improve AROM to stated goals, listed in objective measures above, in order to return to maximal functional potential and improve quality of life. [x] Progressing  [] Met  [] Not Met     STRENGTH: Patient will improve strength to stated goals, listed in objective measures above, in order to improve functional independence and quality of life. [x] Progressing  [] Met  [] Not Met     GAIT: Patient will demonstrate normalized gait mechanics with minimal compensation in order to return to PLOF. [x] Progressing  [] Met  [] Not Met     Patient will return to normal ADL's, IADL's, community involvement, recreational activities, and work-related activities with less than or equal to 0/10 pain and maximal function.  [x] Progressing  [] Met  [] Not Met     Patient will return to recreational activities such as biking and participating in yoga without pain or limitation.  [x] Progressing  [] Met  [] Not Met         PLAN     Continue Plan of Care (POC) and progress per patient tolerance. See treatment section  for details on planned progressions next session.    12/13/2022 (evaluation): Outpatient Physical Therapy 2 times weekly for 12 weeks to include any combination of the following interventions: virtual visits, dry needling, modalities, electrical stimulation (IFC, Pre-Mod, Attended with Functional Dry Needling), Aquatic Therapy, Cervical/Lumbar Traction, Gait Training, Manual Therapy, Neuromuscular Re-ed, Patient Education, Self Care, Therapeutic Exercise, and Therapeutic Activites     Brittanie Clements, PT

## 2023-01-13 ENCOUNTER — CLINICAL SUPPORT (OUTPATIENT)
Dept: REHABILITATION | Facility: HOSPITAL | Age: 58
End: 2023-01-13
Payer: COMMERCIAL

## 2023-01-13 DIAGNOSIS — R53.1 DECREASED STRENGTH, ENDURANCE, AND MOBILITY: ICD-10-CM

## 2023-01-13 DIAGNOSIS — M25.561 ACUTE PAIN OF RIGHT KNEE: Primary | ICD-10-CM

## 2023-01-13 DIAGNOSIS — R68.89 DECREASED STRENGTH, ENDURANCE, AND MOBILITY: ICD-10-CM

## 2023-01-13 DIAGNOSIS — M25.661 DECREASED RANGE OF MOTION OF RIGHT KNEE: ICD-10-CM

## 2023-01-13 DIAGNOSIS — Z74.09 DECREASED STRENGTH, ENDURANCE, AND MOBILITY: ICD-10-CM

## 2023-01-13 PROCEDURE — 97110 THERAPEUTIC EXERCISES: CPT | Performed by: PHYSICAL THERAPIST

## 2023-01-13 PROCEDURE — 97112 NEUROMUSCULAR REEDUCATION: CPT | Performed by: PHYSICAL THERAPIST

## 2023-01-13 NOTE — PROGRESS NOTES
[facility]   Physical Therapy Treatment Note + Progress Note    Name: Alma Vivar  Clinic Number: [id number]    Therapy Diagnosis:   Encounter Diagnoses   Name Primary?    Acute pain of right knee Yes    Decreased range of motion of right knee     Decreased strength, endurance, and mobility        Physician: Candis Room PA-C    Visit Date: 1/13/2023    Physician Orders: PT Eval and Treat  Medical Diagnosis from Referral: acute pain of right knee  Evaluation Date: 12/13/2022  Authorization Period Expiration: 12/31/2022  Plan of Care Expiration: 3/13/2023  Progress Note Due: 2/12/2023  Visit # / Visits authorized: 5/25 (+eval)   FOTO: 1/3 (last performed on 12/13/2022)     Precautions: Standard    PTA Visit #: 0/5     Time In: 0745  Time Out: 0830  Total Billable Time: 40 minutes (denotes billable time)    SUBJECTIVE     Patient reports: that she is feeling a little better today and has been working hard on her foot exercises. Her knee is still feeling much better, and that achy feeling she reported last visit is gone. She did not have any issues following progressions from last visit, and she is happy about that.     She was compliant with home exercise program.  Response to previous treatment: no adverse reaction  Functional change: none noted yet    Pain: 2/10  (right plantar fascia)  Location: right knee (0/10)    OBJECTIVE     Objective Measures updated at progress report only unless specified.     Range of Motion:     Knee AROM/PROM Right Left Pain/Dysfunction with Movement Goal   Knee Flexion (135º) & Knee Extension (0º) 6-0-141  (0-1-137 initial) 6-0-144 Minimal discomfort with flexion on right 6-0-144         Strength:     L/E MMT Right    Left Pain/Dysfunction with Movement Right   1/13/2023  Left  1/13/2023  Goal   Hip Flexion  MMT SCORES: 4+/5 MMT SCORES: 4+/5 No pain with MMT's today 4+ 5 4+/5 B   Hip Extension  MMT SCORES: 3+/5 MMT SCORES: 4-/5   4 4 4+/5 B    Hip Abduction  MMT SCORES: 4-/5 MMT SCORES: 4-/5   4+ 4+ 4+/5 B   Knee Extension MMT SCORES: 4+/5 MMT SCORES: 4+/5   5 5 5/5 B   Knee Flexion MMT SCORES: 4+/5 MMT SCORES: 4+/5   4+ 5 5/5 B   Ankle DF MMT SCORES: 5/5 MMT SCORES: 5/5   5 5 5/5 B   Ankle PF MMT SCORES: 4+/5 MMT SCORES: 4+/5   5 5 5/5 B         Muscle Length:         Muscle Tested  Right Left  Goal   Hip Flexors [] Normal      [x] Limited [] Normal      [x] Limited Normal B   ITB / TFL [] Normal      [x] Limited [] Normal      [x] Limited     Quadriceps [] Normal      [x] Limited [] Normal      [x] Limited Normal B   Hamstrings  [] Normal      [x] Limited [] Normal      [x] Limited Normal B   Gastrocnemius  [] Normal      [x] Limited [] Normal      [x] Limited Normal B   Soleus  [] Normal      [x] Limited [] Normal      [x] Limited Normal B    **Although still decreased, muscle length has improved as compared to previous visits.      Joint Mobility:      Joint Motion Right Mobility  (spine) Left Mobility Goal   Distal Femur AP [] Hypo     [x] Normal     [] Hyper [] Hypo     [x] Normal     [] Hyper Normal    Proximal Tibia AP [] Hypo     [x] Normal     [] Hyper [] Hypo     [x] Normal     [] Hyper Normal    Patellar Medial Glide  [] Hypo     [x] Normal     [] Hyper [x] Hypo     [] Normal     [] Hyper Normal    Patellar Lateral Glide  [] Hypo     [x] Normal     [] Hyper [x] Hypo     [] Normal     [] Hyper Normal    Patellar Superior Glide  [x] Hypo     [] Normal     [] Hyper [x] Hypo     [] Normal     [] Hyper Normal    Patellar Inferior Glide  [x] Hypo     [] Normal     [] Hyper [x] Hypo     [] Normal     [] Hyper Normal           Sensation:  [x] Intact to Light Touch                         [] Impaired:     Palpation: Increased tone noted with palpation of right quadriceps, hamstrings , and hip adductors . Increased tenderness with palpation of medial knee (near but not specifically MCL).     Girth Right  1/13/2023  Left  (12/13/2022)   Directly above  patella 37 cm  (37.5 cm initial)  36 cm         Posture:  Pt presents with postural abnormalities which include:               [x] Forward Head                                [] Increased Lumbar Lordosis              [x] Rounded Shoulder                         [] Genu Recurvatum              [] Increased Thoracic Kyphosis        [x] Genu Valgus              [] Trunk Deviated                              [] Pes Planus              [] Scapular Winging                          [] Other:         PT Functional Testing:     Gait Analysis: The patient ambulated with the following assistive device: none; the pt presents with the following gait abnormalities: bradykinetic, decreased step length bilateral, decreased knee extension on right, and genu valgum on right,   1/13/2023: gait is now WNL. Improved lewis, improved step length, and equal extension bilaterally.         Functional Movement  Analysis   Sit to Stand [x]Functional  []Dysfunctional:  []Painful  [x]Non-Painful      Squat [x]Functional  []Dysfunctional:  []Painful  [x]Non-Painful      Step Down  [x]Functional: improved form  []Dysfunctional: increased valgus []Painful  [x]Non-Painful             Function:     TREATMENT     Alma received the treatments listed below:     MANUAL THERAPY TECHNIQUES were applied for (0) minutes, including:    Manual Intervention Performed Today    Soft Tissue Mobilization []  [] right quadriceps, medial hamstrings, and hip adductors ,   right plantar fascia    Joint Mobilizations []     []     []    Functional Dry Needling  []      Plan for Next Visit: Continue as needed     THERAPEUTIC EXERCISES to develop strength, endurance, ROM, flexibility, posture, and core stabilization for (15) minutes including:    Intervention Performed Today    Upright bike for ROM and strength x 6 minues, level PRN   SLR - flexion, abduction, circles forward/backward  x 15 each LE 2 lbs      Bridges- single leg x 2 x 10 (progressed)   LAQ  3 x 10,  "each LE   Shuttle   Double Leg 5 bands 3x10 reps      Prone quad stretch x Demonstrated 2 reps, 30" holds for HEP understanding        Progress Note x Re-assessment as above in objective section     Plan for Next Visit:      NEUROMUSCULAR RE-EDUCATION ACTIVITIES to improve Balance, Coordination, Kinesthetic, Sense, Proprioception, and Posture for (25) minutes.  The following were included:    Intervention Performed Today    Standing TKE x 30x, each LE, maroon band (progressed)   Standing hip 3-way x 15x each LE   Step downs x 4" step, 2 x 15 each LE   Sumo walks x Down and back 3x along mirror on turf, red theraband (12' one way)   Monster walks x Down and back 3x along mirror on turf, Red theraband   Single leg RDL (added) x 2 x 5 each LE   Towel Curls   X20 reps    Toe Yoga   X15 reps each direction B requiring UE use for overpressure      Plan for Next Visit: RDL       PATIENT EDUCATION AND HOME EXERCISES     Home Exercises Provided and Patient Education Provided     Education provided:   PURPOSE: Patient educated on the impairments noted above and the effects of physical therapy intervention to improve overall condition and QOL.   EXERCISE: Patient was educated on all the above exercise prior/during/after for proper posture, positioning, and execution for safe performance with home exercise program.   STRENGTH: Patient educated on the importance of improved core and extremity strength in order to improve alignment of the spine and extremities with static positions and dynamic movement.     Written Home Exercises Provided: yes.  Exercises were reviewed and Alma was able to demonstrate them prior to the end of the session.  Alma demonstrated good  understanding of the education provided. See EMR under Patient Instructions for exercises provided during therapy sessions.    ASSESSMENT     Patient tolerated treatment well and has attended 6 total treatment sessions. She has made good overall progress with " PT, demonstrating improvements in right knee range of motion, lower extremity strength, and overall dynamic stability. She has done very well with recent progressions and has been able to maintain proper form with only minimal cues. Patient is appropriate to continue to progress per tolerance, and she would benefit from additional PT in order to address remaining limitations, as listed above in objective section, and to reach max functional potential.     Alma is progressing well towards her goals.   Pt prognosis is Good.     Pt will continue to benefit from skilled outpatient physical therapy to address the deficits listed in the problem list box on initial evaluation, provide pt/family education and to maximize pt's level of independence in the home and community environment.     Pt's spiritual, cultural and educational needs considered and pt agreeable to plan of care and goals.     Anticipated Barriers for therapy: none    Goals:  Short Term Goals:  6 weeks Status  Date Met   PAIN: Pt will report worst pain of 3/10 in order to progress toward max functional ability and improve quality of life. [] Progressing  [x] Met  [] Not Met 1/13/2023    FUNCTION: Patient will demonstrate improved function as indicated by a functional limitation score of less than or equal to 36 out of 100 on FOTO. [x] Progressing  [] Met  [] Not Met     STRENGTH: Patient will improve strength to 50% of stated goals, listed in objective measures above, in order to progress towards independence with functional activities.  [] Progressing  [x] Met  [] Not Met 1/13/2023    POSTURE: Patient will correct postural deviations in sitting and standing, to decrease pain and promote long term stability.  [] Progressing  [x] Met  [] Not Met 1/13/2023    GAIT: Patient will demonstrate improved gait mechanics in order to improve functional mobility, improve quality of life, and decrease risk of further injury or fall.  [] Progressing  [x] Met  [] Not  Met 1/13/2023    HEP: Patient will demonstrate independence with HEP in order to progress toward functional independence. [] Progressing  [x] Met  [] Not Met 1/13/2023       Long Term Goals:  12 weeks Status Date Met   PAIN: Pt will report worst pain of 2/10 in order to progress toward max functional ability and improve quality of life [x] Progressing  [] Met  [] Not Met     FUNCTION: Patient will demonstrate improved function as indicated by a functional limitation score of less than or equal to 27 out of 100 on FOTO. [x] Progressing  [] Met  [] Not Met     MOBILITY: Patient will improve AROM to stated goals, listed in objective measures above, in order to return to maximal functional potential and improve quality of life. [x] Progressing  [] Met  [] Not Met Partially Met  1/13/2023    STRENGTH: Patient will improve strength to stated goals, listed in objective measures above, in order to improve functional independence and quality of life. [x] Progressing  [] Met  [] Not Met  Partially Met  1/13/2023   GAIT: Patient will demonstrate normalized gait mechanics with minimal compensation in order to return to PLOF. [] Progressing  [x] Met  [] Not Met 1/13/2023    Patient will return to normal ADL's, IADL's, community involvement, recreational activities, and work-related activities with less than or equal to 0/10 pain and maximal function.  [x] Progressing  [] Met  [] Not Met     Patient will return to recreational activities such as biking and participating in yoga without pain or limitation.  [x] Progressing  [] Met  [] Not Met         PLAN     Continue Plan of Care (POC) and progress per patient tolerance. See treatment section for details on planned progressions next session.    1/13/2023: It is my recommendation that patient continue with PT at her current frequency of 2 times per week for the remainder of her approved visits. Her treatment plan will remain the same, and she will be progressed appropriately.      12/13/2022 (evaluation): Outpatient Physical Therapy 2 times weekly for 12 weeks to include any combination of the following interventions: virtual visits, dry needling, modalities, electrical stimulation (IFC, Pre-Mod, Attended with Functional Dry Needling), Aquatic Therapy, Cervical/Lumbar Traction, Gait Training, Manual Therapy, Neuromuscular Re-ed, Patient Education, Self Care, Therapeutic Exercise, and Therapeutic Activites     Brittanie Clements, PT

## 2023-01-23 ENCOUNTER — CLINICAL SUPPORT (OUTPATIENT)
Dept: REHABILITATION | Facility: HOSPITAL | Age: 58
End: 2023-01-23
Payer: COMMERCIAL

## 2023-01-23 DIAGNOSIS — R53.1 DECREASED STRENGTH, ENDURANCE, AND MOBILITY: ICD-10-CM

## 2023-01-23 DIAGNOSIS — M25.661 DECREASED RANGE OF MOTION OF RIGHT KNEE: ICD-10-CM

## 2023-01-23 DIAGNOSIS — Z74.09 DECREASED STRENGTH, ENDURANCE, AND MOBILITY: ICD-10-CM

## 2023-01-23 DIAGNOSIS — M25.561 ACUTE PAIN OF RIGHT KNEE: Primary | ICD-10-CM

## 2023-01-23 DIAGNOSIS — R68.89 DECREASED STRENGTH, ENDURANCE, AND MOBILITY: ICD-10-CM

## 2023-01-23 PROCEDURE — 97112 NEUROMUSCULAR REEDUCATION: CPT | Performed by: PHYSICAL THERAPIST

## 2023-01-23 PROCEDURE — 97140 MANUAL THERAPY 1/> REGIONS: CPT | Performed by: PHYSICAL THERAPIST

## 2023-01-23 PROCEDURE — 97110 THERAPEUTIC EXERCISES: CPT | Performed by: PHYSICAL THERAPIST

## 2023-01-23 NOTE — PROGRESS NOTES
[facility]   Physical Therapy Treatment Note     Name: Alma Alfonso Daisetta  Clinic Number: [id number]    Therapy Diagnosis:   Encounter Diagnoses   Name Primary?    Acute pain of right knee Yes    Decreased range of motion of right knee     Decreased strength, endurance, and mobility        Physician: Candis Romo PA-C    Visit Date: 1/23/2023    Physician Orders: PT Eval and Treat  Medical Diagnosis from Referral: acute pain of right knee  Evaluation Date: 12/13/2022  Authorization Period Expiration: 12/31/2022  Plan of Care Expiration: 3/13/2023  Progress Note Due: 2/12/2023  Visit # / Visits authorized: 6/25 (+eval)   FOTO: 1/3 (last performed on 12/13/2022)     Precautions: Standard    PTA Visit #: 0/5     Time In: 0708  Time Out: 0750  Total Billable Time: 40 minutes (denotes billable time)    SUBJECTIVE     Patient reports: that her knee is doing great, but her right foot is still painful. She walked about a mile the other day, and her right foot was hurting her after. Patient reports that she is considering going see a podiatrist for the pain. She is doing her foot intrinsic exercises and stretches regularly.     She was compliant with home exercise program.  Response to previous treatment: no adverse reaction  Functional change: none noted yet    Pain: 5-6/10  (right plantar fascia)  Location: right knee (0/10)    OBJECTIVE     Objective Measures updated at progress report only unless specified.       TREATMENT     Alma received the treatments listed below:     MANUAL THERAPY TECHNIQUES were applied for (8) minutes, including:    Manual Intervention Performed Today    Soft Tissue Mobilization []  [x] right quadriceps, medial hamstrings, and hip adductors ,   right plantar fascia    Joint Mobilizations []     []     []    Functional Dry Needling  []      Plan for Next Visit: Continue as needed     THERAPEUTIC EXERCISES to develop strength, endurance, ROM, flexibility,  "posture, and core stabilization for (12) minutes including:    Intervention Performed Today    Upright bike for ROM and strength x 6 minues, level PRN   SLR - flexion, abduction, circles forward/backward  x 15 each LE 2 lbs      Bridges- single leg x 2 x 10    LAQ  3 x 10, each LE   Shuttle   Double Leg 5 bands 3x10 reps      Gastroc stretch - wedge x 3' (added)        Progress Note  Re-assessment as above in objective section     Plan for Next Visit:      NEUROMUSCULAR RE-EDUCATION ACTIVITIES to improve Balance, Coordination, Kinesthetic, Sense, Proprioception, and Posture for (20) minutes.  The following were included:    Intervention Performed Today    Standing TKE x 30x, each LE, maroon band (progressed)   Standing hip 3-way x 15x each LE   Step downs x 4" step, 2 x 15 each LE   Sumo walks x Down and back 3x along mirror on turf, red theraband (12' one way)   Monster walks x Down and back 3x along mirror on turf, Red theraband   Single leg RDL  x 2 x 5 each LE   Towel Curls   X20 reps    Toe Yoga   X15 reps each direction B requiring UE use for overpressure      Plan for Next Visit: RDL       PATIENT EDUCATION AND HOME EXERCISES     Home Exercises Provided and Patient Education Provided     Education provided:   PURPOSE: Patient educated on the impairments noted above and the effects of physical therapy intervention to improve overall condition and QOL.   EXERCISE: Patient was educated on all the above exercise prior/during/after for proper posture, positioning, and execution for safe performance with home exercise program.   STRENGTH: Patient educated on the importance of improved core and extremity strength in order to improve alignment of the spine and extremities with static positions and dynamic movement.     Written Home Exercises Provided: yes.  Exercises were reviewed and Alma was able to demonstrate them prior to the end of the session.  Alma demonstrated good  understanding of the education " provided. See EMR under Patient Instructions for exercises provided during therapy sessions.    ASSESSMENT     Patient did well with treatment today. She is able to tolerate higher level strengthening activities for the LE's without issue. Her quadriceps strength is improving well. Patient's biggest limitation att his time is more her right foot pain from plantar fasciitis. If it weren't for that pain, she would have returned to most functional activities without knee pain. Good relief noted with manual therapy, and decreased pain levels reported during gait following treatment.    Alma is progressing well towards her goals.   Pt prognosis is Good.     Pt will continue to benefit from skilled outpatient physical therapy to address the deficits listed in the problem list box on initial evaluation, provide pt/family education and to maximize pt's level of independence in the home and community environment.     Pt's spiritual, cultural and educational needs considered and pt agreeable to plan of care and goals.     Anticipated Barriers for therapy: none    Goals:  Short Term Goals:  6 weeks Status  Date Met   PAIN: Pt will report worst pain of 3/10 in order to progress toward max functional ability and improve quality of life. [] Progressing  [x] Met  [] Not Met 1/13/2023    FUNCTION: Patient will demonstrate improved function as indicated by a functional limitation score of less than or equal to 36 out of 100 on FOTO. [x] Progressing  [] Met  [] Not Met     STRENGTH: Patient will improve strength to 50% of stated goals, listed in objective measures above, in order to progress towards independence with functional activities.  [] Progressing  [x] Met  [] Not Met 1/13/2023    POSTURE: Patient will correct postural deviations in sitting and standing, to decrease pain and promote long term stability.  [] Progressing  [x] Met  [] Not Met 1/13/2023    GAIT: Patient will demonstrate improved gait mechanics in order to  improve functional mobility, improve quality of life, and decrease risk of further injury or fall.  [] Progressing  [x] Met  [] Not Met 1/13/2023    HEP: Patient will demonstrate independence with HEP in order to progress toward functional independence. [] Progressing  [x] Met  [] Not Met 1/13/2023       Long Term Goals:  12 weeks Status Date Met   PAIN: Pt will report worst pain of 2/10 in order to progress toward max functional ability and improve quality of life [x] Progressing  [] Met  [] Not Met     FUNCTION: Patient will demonstrate improved function as indicated by a functional limitation score of less than or equal to 27 out of 100 on FOTO. [x] Progressing  [] Met  [] Not Met     MOBILITY: Patient will improve AROM to stated goals, listed in objective measures above, in order to return to maximal functional potential and improve quality of life. [x] Progressing  [] Met  [] Not Met Partially Met  1/13/2023    STRENGTH: Patient will improve strength to stated goals, listed in objective measures above, in order to improve functional independence and quality of life. [x] Progressing  [] Met  [] Not Met  Partially Met  1/13/2023   GAIT: Patient will demonstrate normalized gait mechanics with minimal compensation in order to return to PLOF. [] Progressing  [x] Met  [] Not Met 1/13/2023    Patient will return to normal ADL's, IADL's, community involvement, recreational activities, and work-related activities with less than or equal to 0/10 pain and maximal function.  [x] Progressing  [] Met  [] Not Met     Patient will return to recreational activities such as biking and participating in yoga without pain or limitation.  [x] Progressing  [] Met  [] Not Met         PLAN     Continue Plan of Care (POC) and progress per patient tolerance. See treatment section for details on planned progressions next session.    1/13/2023: It is my recommendation that patient continue with PT at her current frequency of 2 times per  week for the remainder of her approved visits. Her treatment plan will remain the same, and she will be progressed appropriately.     12/13/2022 (evaluation): Outpatient Physical Therapy 2 times weekly for 12 weeks to include any combination of the following interventions: virtual visits, dry needling, modalities, electrical stimulation (IFC, Pre-Mod, Attended with Functional Dry Needling), Aquatic Therapy, Cervical/Lumbar Traction, Gait Training, Manual Therapy, Neuromuscular Re-ed, Patient Education, Self Care, Therapeutic Exercise, and Therapeutic Activites     Brittanie Clements, PT

## 2023-01-26 ENCOUNTER — CLINICAL SUPPORT (OUTPATIENT)
Dept: REHABILITATION | Facility: HOSPITAL | Age: 58
End: 2023-01-26
Payer: COMMERCIAL

## 2023-01-26 DIAGNOSIS — R68.89 DECREASED STRENGTH, ENDURANCE, AND MOBILITY: ICD-10-CM

## 2023-01-26 DIAGNOSIS — Z74.09 DECREASED STRENGTH, ENDURANCE, AND MOBILITY: ICD-10-CM

## 2023-01-26 DIAGNOSIS — M25.661 DECREASED RANGE OF MOTION OF RIGHT KNEE: ICD-10-CM

## 2023-01-26 DIAGNOSIS — M25.561 ACUTE PAIN OF RIGHT KNEE: Primary | ICD-10-CM

## 2023-01-26 DIAGNOSIS — R53.1 DECREASED STRENGTH, ENDURANCE, AND MOBILITY: ICD-10-CM

## 2023-01-26 PROCEDURE — 97140 MANUAL THERAPY 1/> REGIONS: CPT | Performed by: PHYSICAL THERAPIST

## 2023-01-26 PROCEDURE — 97110 THERAPEUTIC EXERCISES: CPT | Performed by: PHYSICAL THERAPIST

## 2023-01-26 PROCEDURE — 97112 NEUROMUSCULAR REEDUCATION: CPT | Performed by: PHYSICAL THERAPIST

## 2023-01-26 NOTE — PROGRESS NOTES
[facility]   Physical Therapy Treatment Note     Name: Alma Alfonso Cumberland  Clinic Number: [id number]    Therapy Diagnosis:   Encounter Diagnoses   Name Primary?    Acute pain of right knee Yes    Decreased range of motion of right knee     Decreased strength, endurance, and mobility        Physician: Candis Romo PA-C    Visit Date: 1/26/2023    Physician Orders: PT Eval and Treat  Medical Diagnosis from Referral: acute pain of right knee  Evaluation Date: 12/13/2022  Authorization Period Expiration: 12/31/2022  Plan of Care Expiration: 3/13/2023  Progress Note Due: 2/12/2023  Visit # / Visits authorized: 7/25 (+eval)   FOTO: 1/3 (last performed on 12/13/2022)     Precautions: Standard    PTA Visit #: 0/5     Time In: 0701  Time Out: 0747  Total Billable Time: 45 minutes (denotes billable time)    SUBJECTIVE     Patient reports: that her knee is doing great, but her right foot is still painful. She walked about a mile the other day, and her right foot was hurting her after. Patient reports that she is considering going see a podiatrist for the pain. She is doing her foot intrinsic exercises and stretches regularly.     She was compliant with home exercise program.  Response to previous treatment: no adverse reaction  Functional change: none noted yet    Pain: 2/10  (right plantar fascia)  Location: right knee (0/10)    OBJECTIVE     Objective Measures updated at progress report only unless specified.       TREATMENT     Alma received the treatments listed below:     MANUAL THERAPY TECHNIQUES were applied for (10) minutes, including:    Manual Intervention Performed Today    Soft Tissue Mobilization []  [x] right quadriceps, medial hamstrings, and hip adductors ,   right plantar fascia    Joint Mobilizations []     []     []    Functional Dry Needling  []      Plan for Next Visit: Continue as needed     THERAPEUTIC EXERCISES to develop strength, endurance, ROM, flexibility,  "posture, and core stabilization for (12) minutes including:    Intervention Performed Today    Upright bike for ROM and strength x 6 minues, level PRN   SLR - flexion, abduction, circles forward/backward  x 15 each LE 2 lbs      Bridges- single leg x 2 x 10    LAQ  3 x 10, each LE   Shuttle   Double Leg 5 bands 3x10 reps      Gastroc stretch - wedge x 3'         Progress Note  Re-assessment as above in objective section     Plan for Next Visit:      NEUROMUSCULAR RE-EDUCATION ACTIVITIES to improve Balance, Coordination, Kinesthetic, Sense, Proprioception, and Posture for (23) minutes.  The following were included:    Intervention Performed Today    Standing TKE x 30x, each LE, maroon band (progressed)   Standing hip 3-way x 15x each LE   Step downs x 6" step, 2 x 10 each LE (progressed)   Sumo walks x Down and back 3x along mirror on turf, green theraband (12' one way)   Monster walks x Down and back 3x along mirror on turf, green theraband   Single leg RDL  x 2 x 5 each LE (discussed progressions for HEP)   Lunges with slider x 5x each LE (added)   Towel Curls   X20 reps    Toe Yoga   X15 reps each direction B requiring UE use for overpressure           Plan for Next Visit: RDL       PATIENT EDUCATION AND HOME EXERCISES     Home Exercises Provided and Patient Education Provided     Education provided:   PURPOSE: Patient educated on the impairments noted above and the effects of physical therapy intervention to improve overall condition and QOL.   EXERCISE: Patient was educated on all the above exercise prior/during/after for proper posture, positioning, and execution for safe performance with home exercise program.   STRENGTH: Patient educated on the importance of improved core and extremity strength in order to improve alignment of the spine and extremities with static positions and dynamic movement.     Written Home Exercises Provided: yes.  Exercises were reviewed and Alma was able to demonstrate them prior " to the end of the session.  Alma demonstrated good  understanding of the education provided. See EMR under Patient Instructions for exercises provided during therapy sessions.    ASSESSMENT     Patient tolerated treatment well and without complaint. She was able to be progressed to higher step with step downs and to lunges with slider. She did well with lunges, demonstrated good overall form and stability. No pain reported in knee. Decreased soft tissue adhesions noted along plantar fascia as compared to previous visits, but still present. This improved following manual therapy, and patient reported good relief following. Patient progressing very well towards goals.     Alma is progressing well towards her goals.   Pt prognosis is Good.     Pt will continue to benefit from skilled outpatient physical therapy to address the deficits listed in the problem list box on initial evaluation, provide pt/family education and to maximize pt's level of independence in the home and community environment.     Pt's spiritual, cultural and educational needs considered and pt agreeable to plan of care and goals.     Anticipated Barriers for therapy: none    Goals:  Short Term Goals:  6 weeks Status  Date Met   PAIN: Pt will report worst pain of 3/10 in order to progress toward max functional ability and improve quality of life. [] Progressing  [x] Met  [] Not Met 1/13/2023    FUNCTION: Patient will demonstrate improved function as indicated by a functional limitation score of less than or equal to 36 out of 100 on FOTO. [x] Progressing  [] Met  [] Not Met     STRENGTH: Patient will improve strength to 50% of stated goals, listed in objective measures above, in order to progress towards independence with functional activities.  [] Progressing  [x] Met  [] Not Met 1/13/2023    POSTURE: Patient will correct postural deviations in sitting and standing, to decrease pain and promote long term stability.  [] Progressing  [x]  Met  [] Not Met 1/13/2023    GAIT: Patient will demonstrate improved gait mechanics in order to improve functional mobility, improve quality of life, and decrease risk of further injury or fall.  [] Progressing  [x] Met  [] Not Met 1/13/2023    HEP: Patient will demonstrate independence with HEP in order to progress toward functional independence. [] Progressing  [x] Met  [] Not Met 1/13/2023       Long Term Goals:  12 weeks Status Date Met   PAIN: Pt will report worst pain of 2/10 in order to progress toward max functional ability and improve quality of life [x] Progressing  [] Met  [] Not Met     FUNCTION: Patient will demonstrate improved function as indicated by a functional limitation score of less than or equal to 27 out of 100 on FOTO. [x] Progressing  [] Met  [] Not Met     MOBILITY: Patient will improve AROM to stated goals, listed in objective measures above, in order to return to maximal functional potential and improve quality of life. [x] Progressing  [] Met  [] Not Met Partially Met  1/13/2023    STRENGTH: Patient will improve strength to stated goals, listed in objective measures above, in order to improve functional independence and quality of life. [x] Progressing  [] Met  [] Not Met  Partially Met  1/13/2023   GAIT: Patient will demonstrate normalized gait mechanics with minimal compensation in order to return to PLOF. [] Progressing  [x] Met  [] Not Met 1/13/2023    Patient will return to normal ADL's, IADL's, community involvement, recreational activities, and work-related activities with less than or equal to 0/10 pain and maximal function.  [x] Progressing  [] Met  [] Not Met     Patient will return to recreational activities such as biking and participating in yoga without pain or limitation.  [x] Progressing  [] Met  [] Not Met         PLAN     Continue Plan of Care (POC) and progress per patient tolerance. See treatment section for details on planned progressions next session.    1/13/2023:  It is my recommendation that patient continue with PT at her current frequency of 2 times per week for the remainder of her approved visits. Her treatment plan will remain the same, and she will be progressed appropriately.     12/13/2022 (evaluation): Outpatient Physical Therapy 2 times weekly for 12 weeks to include any combination of the following interventions: virtual visits, dry needling, modalities, electrical stimulation (IFC, Pre-Mod, Attended with Functional Dry Needling), Aquatic Therapy, Cervical/Lumbar Traction, Gait Training, Manual Therapy, Neuromuscular Re-ed, Patient Education, Self Care, Therapeutic Exercise, and Therapeutic Activites     Brittanie Clements, PT

## 2023-01-27 DIAGNOSIS — M72.2 BILATERAL PLANTAR FASCIITIS: Primary | ICD-10-CM

## 2023-01-30 ENCOUNTER — HOSPITAL ENCOUNTER (OUTPATIENT)
Dept: RADIOLOGY | Facility: HOSPITAL | Age: 58
Discharge: HOME | End: 2023-01-30
Attending: STUDENT IN AN ORGANIZED HEALTH CARE EDUCATION/TRAINING PROGRAM
Payer: COMMERCIAL

## 2023-01-30 ENCOUNTER — OFFICE VISIT (OUTPATIENT)
Dept: SPORTS MEDICINE | Facility: CLINIC | Age: 58
End: 2023-01-30
Payer: COMMERCIAL

## 2023-01-30 VITALS — RESPIRATION RATE: 20 BRPM | BODY MASS INDEX: 27.18 KG/M2 | WEIGHT: 143.94 LBS | HEIGHT: 61 IN

## 2023-01-30 DIAGNOSIS — M72.2 BILATERAL PLANTAR FASCIITIS: Primary | ICD-10-CM

## 2023-01-30 DIAGNOSIS — M72.2 BILATERAL PLANTAR FASCIITIS: ICD-10-CM

## 2023-01-30 PROCEDURE — 1159F PR MEDICATION LIST DOCUMENTED IN MEDICAL RECORD: ICD-10-PCS | Mod: CPTII,S$GLB,, | Performed by: STUDENT IN AN ORGANIZED HEALTH CARE EDUCATION/TRAINING PROGRAM

## 2023-01-30 PROCEDURE — 3008F PR BODY MASS INDEX (BMI) DOCUMENTED: ICD-10-PCS | Mod: CPTII,S$GLB,, | Performed by: STUDENT IN AN ORGANIZED HEALTH CARE EDUCATION/TRAINING PROGRAM

## 2023-01-30 PROCEDURE — 76882 US LMTD JT/FCL EVL NVASC XTR: CPT | Mod: S$GLB,,, | Performed by: STUDENT IN AN ORGANIZED HEALTH CARE EDUCATION/TRAINING PROGRAM

## 2023-01-30 PROCEDURE — 1159F MED LIST DOCD IN RCRD: CPT | Mod: CPTII,S$GLB,, | Performed by: STUDENT IN AN ORGANIZED HEALTH CARE EDUCATION/TRAINING PROGRAM

## 2023-01-30 PROCEDURE — 73630 X-RAY EXAM OF FOOT: CPT | Mod: 26,,, | Performed by: RADIOLOGY

## 2023-01-30 PROCEDURE — 99204 PR OFFICE/OUTPT VISIT, NEW, LEVL IV, 45-59 MIN: ICD-10-PCS | Mod: 25,S$GLB,, | Performed by: STUDENT IN AN ORGANIZED HEALTH CARE EDUCATION/TRAINING PROGRAM

## 2023-01-30 PROCEDURE — 73630 X-RAY EXAM OF FOOT: CPT | Mod: TC,50

## 2023-01-30 PROCEDURE — 99999 PR PBB SHADOW E&M-EST. PATIENT-LVL III: ICD-10-PCS | Mod: PBBFAC,,, | Performed by: STUDENT IN AN ORGANIZED HEALTH CARE EDUCATION/TRAINING PROGRAM

## 2023-01-30 PROCEDURE — 76882 PR  US,EXTREMITY,NONVASCULAR,REAL-TIME IMAGE,LIMITED: ICD-10-PCS | Mod: S$GLB,,, | Performed by: STUDENT IN AN ORGANIZED HEALTH CARE EDUCATION/TRAINING PROGRAM

## 2023-01-30 PROCEDURE — 99999 PR PBB SHADOW E&M-EST. PATIENT-LVL III: CPT | Mod: PBBFAC,,, | Performed by: STUDENT IN AN ORGANIZED HEALTH CARE EDUCATION/TRAINING PROGRAM

## 2023-01-30 PROCEDURE — 73630 XR FOOT COMPLETE 3 VIEW BILATERAL: ICD-10-PCS | Mod: 26,,, | Performed by: RADIOLOGY

## 2023-01-30 PROCEDURE — 3008F BODY MASS INDEX DOCD: CPT | Mod: CPTII,S$GLB,, | Performed by: STUDENT IN AN ORGANIZED HEALTH CARE EDUCATION/TRAINING PROGRAM

## 2023-01-30 PROCEDURE — 99204 OFFICE O/P NEW MOD 45 MIN: CPT | Mod: 25,S$GLB,, | Performed by: STUDENT IN AN ORGANIZED HEALTH CARE EDUCATION/TRAINING PROGRAM

## 2023-01-30 NOTE — PATIENT INSTRUCTIONS
Assessment:  Alma Vivar is a 57 y.o. female   Chief Complaint   Patient presents with    Right Foot - Pain    Left Foot - Pain       Encounter Diagnosis   Name Primary?    Bilateral plantar fasciitis Yes        Plan:  Reviewed your x-rays with you today and discussed pertinent findings.   We have reviewed the natural history of this disorder and discussed treatment and management options moving forward.  Recommend strassburg sock as needed.                            If you have any difficulties reading this information, you may visit the online version using the following link: Plantar Fascitis ([email or web address].aaos.org/globalassets/pdfs/[email or web address]-[email or web address])     Here is some general information on TenJet procedure. You may also watch the video on this link to see an overview of how the procedure goes as well.   TenJet Video                      Follow-up: AS needed or sooner if there are any problems between now and then.    Thank you for choosing [facility] and Dr. Kashmir Sutton for your orthopedic & sports medicine care. It is our goal to provide you with exceptional care that will help keep you healthy, active, and get you back in the game.    Please do not hesitate to reach out to us via email, phone, or MyChart with any questions, concerns, or feedback.    If you felt that you received exemplary care today, please consider leaving us feedback on Healthgrades at:  [email or web address]    If you are experiencing pain/discomfort ,or have questions after 5pm and would like to be connected to the [facility] on-call team, please call this number and specify which Sports Medicine provider is treating you: [phone]

## 2023-01-31 ENCOUNTER — CLINICAL SUPPORT (OUTPATIENT)
Dept: REHABILITATION | Facility: HOSPITAL | Age: 58
End: 2023-01-31
Payer: COMMERCIAL

## 2023-01-31 DIAGNOSIS — R53.1 DECREASED STRENGTH, ENDURANCE, AND MOBILITY: ICD-10-CM

## 2023-01-31 DIAGNOSIS — M25.561 ACUTE PAIN OF RIGHT KNEE: Primary | ICD-10-CM

## 2023-01-31 DIAGNOSIS — M25.661 DECREASED RANGE OF MOTION OF RIGHT KNEE: ICD-10-CM

## 2023-01-31 DIAGNOSIS — Z74.09 DECREASED STRENGTH, ENDURANCE, AND MOBILITY: ICD-10-CM

## 2023-01-31 DIAGNOSIS — R68.89 DECREASED STRENGTH, ENDURANCE, AND MOBILITY: ICD-10-CM

## 2023-01-31 PROCEDURE — 97110 THERAPEUTIC EXERCISES: CPT | Performed by: PHYSICAL THERAPIST

## 2023-01-31 PROCEDURE — 97140 MANUAL THERAPY 1/> REGIONS: CPT | Performed by: PHYSICAL THERAPIST

## 2023-01-31 NOTE — PROGRESS NOTES
[facility]PAT[facility]ERAP[facility]LL[facility]   Physical Therapy Treatment Note     Name: Alma Alfonso Eldred  Clinic Number: [id number]    Therapy Diagnosis:   Encounter Diagnoses   Name Primary?    Acute pain of right knee Yes    Decreased range of motion of right knee     Decreased strength, endurance, and mobility        Physician: Candis Romo PA-C    Visit Date: 1/31/2023    Physician Orders: PT Eval and Treat  Medical Diagnosis from Referral: acute pain of right knee  Evaluation Date: 12/13/2022  Authorization Period Expiration: 12/31/2022  Plan of Care Expiration: 3/13/2023  Progress Note Due: 2/12/2023  Visit # / Visits authorized: 8/25 (+eval)   FOTO: 1/3 (last performed on 12/13/2022)     Precautions: Standard    PTA Visit #: 0/5     Time In: 1518  Time Out: 1602  Total Billable Time: 41 minutes (denotes billable time)    SUBJECTIVE     Patient reports: that she increased her ankle weights this weekend and did a full 30 minute low impact ride on her bike and had no issues with her knee. Her foot is still painful in the mornings, but loosens up throughout the day. She is getting back into her yoga routine without knee pain as well. She saw her doctor for her foot yesterday, and he recommended a sock and night splint, and for her to continue with conservative treatments. If she does not notice any improvements over the next three months, only then would they consider any additional procedures.     She was compliant with home exercise program.  Response to previous treatment: no adverse reaction  Functional change: none noted yet    Pain: 2/10  (right plantar fascia)  Location: right knee (0/10)    OBJECTIVE     Objective Measures updated at progress report only unless specified.       TREATMENT     Alma received the treatments listed below:     MANUAL THERAPY TECHNIQUES were applied for (10) minutes, including:    Manual Intervention Performed Today    Soft Tissue Mobilization []  [x] right quadriceps,  "medial hamstrings, and hip adductors ,   right plantar fascia    Joint Mobilizations []     []     []    Functional Dry Needling  []      Plan for Next Visit: Continue as needed     THERAPEUTIC EXERCISES to develop strength, endurance, ROM, flexibility, posture, and core stabilization for (31) minutes including:    Intervention Performed Today    Upright bike for ROM and strength x 6 minues, level PRN   SLR - flexion, abduction, circles forward/backward  x 15 each LE 2 lbs      Bridges- single leg  2 x 10    LAQ  3 x 10, each LE   Shuttle   Double Leg 5 bands 3x10 reps      Gastroc stretch - wedge  3'    Toe yoga x 3', 5" holds on and off, B LE   Toe abduction/adduction x 3', 5" holds on and off, B LE   Toe flexion/extension x 3', 5" holds on and off, B LE   BAPS board x 2' PF/DF, and EV/IV, R foot only   Standing block rocker board x Forward/back, side side, 1' each   Progress Note  Re-assessment as above in objective section     Plan for Next Visit:      NEUROMUSCULAR RE-EDUCATION ACTIVITIES to improve Balance, Coordination, Kinesthetic, Sense, Proprioception, and Posture for (0) minutes.  The following were included:    Intervention Performed Today    Standing TKE  30x, each LE, maroon band (progressed)   Standing hip 3-way  15x each LE   Step downs  6" step, 2 x 10 each LE (progressed)   Sumo walks  Down and back 3x along mirror on turf, green theraband (12' one way)   Monster walks  Down and back 3x along mirror on turf, green theraband   Single leg RDL   2 x 5 each LE (discussed progressions for HEP)   Lunges with slider  5x each LE (added)          Plan for Next Visit: RDL       PATIENT EDUCATION AND HOME EXERCISES     Home Exercises Provided and Patient Education Provided     Education provided:   PURPOSE: Patient educated on the impairments noted above and the effects of physical therapy intervention to improve overall condition and QOL.   EXERCISE: Patient was educated on all the above exercise " prior/during/after for proper posture, positioning, and execution for safe performance with home exercise program.   STRENGTH: Patient educated on the importance of improved core and extremity strength in order to improve alignment of the spine and extremities with static positions and dynamic movement.     Written Home Exercises Provided: yes.  Exercises were reviewed and Alma was able to demonstrate them prior to the end of the session.  Alma demonstrated good  understanding of the education provided. See EMR under Patient Instructions for exercises provided during therapy sessions.    ASSESSMENT     Patient did well with treatment today, and treatment focus geared more today towards feet. Started to focus more on foot instrinsic strengthening and neuromuscular control. Patient demonstrates difficulty with seated control inversion and eversion on BAPS board, but she did well with plantar flexion and dorsiflexion motions. Patient will continue working on knee strengthening exercises at home per tolerance. Decreased soft tissue adhesions noted along plantar fascia this visit as compared to last visit, and patient does report the tenderness is a little less there today.      Alma is progressing well towards her goals.   Pt prognosis is Good.     Pt will continue to benefit from skilled outpatient physical therapy to address the deficits listed in the problem list box on initial evaluation, provide pt/family education and to maximize pt's level of independence in the home and community environment.     Pt's spiritual, cultural and educational needs considered and pt agreeable to plan of care and goals.     Anticipated Barriers for therapy: none    Goals:  Short Term Goals:  6 weeks Status  Date Met   PAIN: Pt will report worst pain of 3/10 in order to progress toward max functional ability and improve quality of life. [] Progressing  [x] Met  [] Not Met 1/13/2023    FUNCTION: Patient will demonstrate  improved function as indicated by a functional limitation score of less than or equal to 36 out of 100 on FOTO. [x] Progressing  [] Met  [] Not Met     STRENGTH: Patient will improve strength to 50% of stated goals, listed in objective measures above, in order to progress towards independence with functional activities.  [] Progressing  [x] Met  [] Not Met 1/13/2023    POSTURE: Patient will correct postural deviations in sitting and standing, to decrease pain and promote long term stability.  [] Progressing  [x] Met  [] Not Met 1/13/2023    GAIT: Patient will demonstrate improved gait mechanics in order to improve functional mobility, improve quality of life, and decrease risk of further injury or fall.  [] Progressing  [x] Met  [] Not Met 1/13/2023    HEP: Patient will demonstrate independence with HEP in order to progress toward functional independence. [] Progressing  [x] Met  [] Not Met 1/13/2023       Long Term Goals:  12 weeks Status Date Met   PAIN: Pt will report worst pain of 2/10 in order to progress toward max functional ability and improve quality of life [x] Progressing  [] Met  [] Not Met     FUNCTION: Patient will demonstrate improved function as indicated by a functional limitation score of less than or equal to 27 out of 100 on FOTO. [x] Progressing  [] Met  [] Not Met     MOBILITY: Patient will improve AROM to stated goals, listed in objective measures above, in order to return to maximal functional potential and improve quality of life. [x] Progressing  [] Met  [] Not Met Partially Met  1/13/2023    STRENGTH: Patient will improve strength to stated goals, listed in objective measures above, in order to improve functional independence and quality of life. [x] Progressing  [] Met  [] Not Met  Partially Met  1/13/2023   GAIT: Patient will demonstrate normalized gait mechanics with minimal compensation in order to return to PLOF. [] Progressing  [x] Met  [] Not Met 1/13/2023    Patient will return to  normal ADL's, IADL's, community involvement, recreational activities, and work-related activities with less than or equal to 0/10 pain and maximal function.  [x] Progressing  [] Met  [] Not Met     Patient will return to recreational activities such as biking and participating in yoga without pain or limitation.  [x] Progressing  [] Met  [] Not Met         PLAN     Continue Plan of Care (POC) and progress per patient tolerance. See treatment section for details on planned progressions next session.    1/13/2023: It is my recommendation that patient continue with PT at her current frequency of 2 times per week for the remainder of her approved visits. Her treatment plan will remain the same, and she will be progressed appropriately.     12/13/2022 (evaluation): Outpatient Physical Therapy 2 times weekly for 12 weeks to include any combination of the following interventions: virtual visits, dry needling, modalities, electrical stimulation (IFC, Pre-Mod, Attended with Functional Dry Needling), Aquatic Therapy, Cervical/Lumbar Traction, Gait Training, Manual Therapy, Neuromuscular Re-ed, Patient Education, Self Care, Therapeutic Exercise, and Therapeutic Activites     Brittanie Clements, PT

## 2023-02-10 ENCOUNTER — CLINICAL SUPPORT (OUTPATIENT)
Dept: REHABILITATION | Facility: HOSPITAL | Age: 58
End: 2023-02-10
Payer: COMMERCIAL

## 2023-02-10 DIAGNOSIS — R68.89 DECREASED STRENGTH, ENDURANCE, AND MOBILITY: ICD-10-CM

## 2023-02-10 DIAGNOSIS — M25.661 DECREASED RANGE OF MOTION OF RIGHT KNEE: ICD-10-CM

## 2023-02-10 DIAGNOSIS — R53.1 DECREASED STRENGTH, ENDURANCE, AND MOBILITY: ICD-10-CM

## 2023-02-10 DIAGNOSIS — Z74.09 DECREASED STRENGTH, ENDURANCE, AND MOBILITY: ICD-10-CM

## 2023-02-10 DIAGNOSIS — M25.561 ACUTE PAIN OF RIGHT KNEE: Primary | ICD-10-CM

## 2023-02-10 PROCEDURE — 97112 NEUROMUSCULAR REEDUCATION: CPT | Performed by: PHYSICAL THERAPIST

## 2023-02-10 PROCEDURE — 97110 THERAPEUTIC EXERCISES: CPT | Performed by: PHYSICAL THERAPIST

## 2023-02-10 NOTE — PROGRESS NOTES
[facility]   Physical Therapy Treatment Note     Name: Alma Alfonso [place]  Clinic Number: [id number]    Therapy Diagnosis:   Encounter Diagnoses   Name Primary?    Acute pain of right knee Yes    Decreased range of motion of right knee     Decreased strength, endurance, and mobility        Physician: Candis Romo PA-C    Visit Date: 2/10/2023    Physician Orders: PT Eval and Treat  Medical Diagnosis from Referral: acute pain of right knee  Evaluation Date: 12/13/2022  Authorization Period Expiration: 12/31/2022  Plan of Care Expiration: 3/13/2023  Progress Note Due: 2/12/2023  Visit # / Visits authorized: 9/25 (+eval)   FOTO: 1/3 (last performed on 12/13/2022)     Precautions: Standard    PTA Visit #: 0/5     Time In: 0704  Time Out: 0748  Total Billable Time: 40 minutes (denotes billable time)    SUBJECTIVE     Patient reports: that she is actually feeling a lot better. She bought heel cups for her shoes, and she has been wearing the sock recommended by her MD, and it seems to be really helping, especially in the mornings.     She was compliant with home exercise program.  Response to previous treatment: no adverse reaction  Functional change: significant improvement in strength and stability    Pain: 0/10  (right plantar fascia)  Location: right knee (0/10)    OBJECTIVE     Objective Measures updated at progress report only unless specified.       TREATMENT     Alma received the treatments listed below:     MANUAL THERAPY TECHNIQUES were applied for (0) minutes, including:    Manual Intervention Performed Today    Soft Tissue Mobilization []  [] right quadriceps, medial hamstrings, and hip adductors ,   right plantar fascia    Joint Mobilizations []     []     []    Functional Dry Needling  []      Plan for Next Visit: Continue as needed     THERAPEUTIC EXERCISES to develop strength, endurance, ROM, flexibility, posture, and core stabilization for (28) minutes  "including:    Intervention Performed Today    Upright bike for ROM and strength x 6 minues, level PRN   SLR - flexion, abduction, circles forward/backward  x 15 each LE 2 lbs      Bridges- single leg  2 x 10    LAQ  3 x 10, each LE   Shuttle   Double Leg 5 bands 3x10 reps      Gastroc stretch - wedge  3'    Toe yoga - seated  3', 5" holds on and off, B LE   Toe abduction/adduction x 3', 5" holds on and off, B LE   Toe flexion/extension x 3', 5" holds on and off, B LE   BAPS board  2' PF/DF, and EV/IV, R foot only   Standing block rocker board x Forward/back, side side, 1' each   Ankle theraband  x 20x each direction, each foot, green theraband    Progress Note  Re-assessment as above in objective section     Plan for Next Visit:        NEUROMUSCULAR RE-EDUCATION ACTIVITIES to improve Balance, Coordination, Kinesthetic, Sense, Proprioception, and Posture for (12) minutes.  The following were included:  **billing includes set up and education for exercises  Intervention Performed Today    Standing TKE  30x, each LE, maroon band (progressed)   Standing hip 3-way  15x each LE   Step downs  6" step, 2 x 10 each LE (progressed)   Sumo walks  Down and back 3x along mirror on turf, green theraband (12' one way)   Monster walks  Down and back 3x along mirror on turf, green theraband   Single leg RDL   2 x 5 each LE (discussed progressions for HEP)   Lunges with slider  5x each LE (added)   Standing Moboboard x 1' each position, each foot   Standing toe yoga x 3', 5" holds on and off, B LE     Plan for Next Visit: RDL       PATIENT EDUCATION AND HOME EXERCISES     Home Exercises Provided and Patient Education Provided     Education provided:   PURPOSE: Patient educated on the impairments noted above and the effects of physical therapy intervention to improve overall condition and QOL.   EXERCISE: Patient was educated on all the above exercise prior/during/after for proper posture, positioning, and execution for safe " performance with home exercise program.   STRENGTH: Patient educated on the importance of improved core and extremity strength in order to improve alignment of the spine and extremities with static positions and dynamic movement.     Written Home Exercises Provided: yes.  Exercises were reviewed and Alma was able to demonstrate them prior to the end of the session.  Alma demonstrated good  understanding of the education provided. See EMR under Patient Instructions for exercises provided during therapy sessions.    ASSESSMENT     Patient tolerated treatment well and completed exercises without complaint. Patient still demonstrates minimal difficulty with ankle inversion and eversion, but this has improved since last visit. She was able to be progressed this visit with additional ankle/foot strengthening and stabilization exercises. Patient is compliant with HEP, and she will keep up with exercises while she is traveling for work next week. She will let us know if she would like to continue with PT once she returns.    Alma is progressing well towards her goals.   Pt prognosis is Good.     Pt will continue to benefit from skilled outpatient physical therapy to address the deficits listed in the problem list box on initial evaluation, provide pt/family education and to maximize pt's level of independence in the home and community environment.     Pt's spiritual, cultural and educational needs considered and pt agreeable to plan of care and goals.     Anticipated Barriers for therapy: none    Goals:  Short Term Goals:  6 weeks Status  Date Met   PAIN: Pt will report worst pain of 3/10 in order to progress toward max functional ability and improve quality of life. [] Progressing  [x] Met  [] Not Met 1/13/2023    FUNCTION: Patient will demonstrate improved function as indicated by a functional limitation score of less than or equal to 36 out of 100 on FOTO. [x] Progressing  [] Met  [] Not Met     STRENGTH:  Patient will improve strength to 50% of stated goals, listed in objective measures above, in order to progress towards independence with functional activities.  [] Progressing  [x] Met  [] Not Met 1/13/2023    POSTURE: Patient will correct postural deviations in sitting and standing, to decrease pain and promote long term stability.  [] Progressing  [x] Met  [] Not Met 1/13/2023    GAIT: Patient will demonstrate improved gait mechanics in order to improve functional mobility, improve quality of life, and decrease risk of further injury or fall.  [] Progressing  [x] Met  [] Not Met 1/13/2023    HEP: Patient will demonstrate independence with HEP in order to progress toward functional independence. [] Progressing  [x] Met  [] Not Met 1/13/2023       Long Term Goals:  12 weeks Status Date Met   PAIN: Pt will report worst pain of 2/10 in order to progress toward max functional ability and improve quality of life [x] Progressing  [] Met  [] Not Met     FUNCTION: Patient will demonstrate improved function as indicated by a functional limitation score of less than or equal to 27 out of 100 on FOTO. [x] Progressing  [] Met  [] Not Met     MOBILITY: Patient will improve AROM to stated goals, listed in objective measures above, in order to return to maximal functional potential and improve quality of life. [x] Progressing  [] Met  [] Not Met Partially Met  1/13/2023    STRENGTH: Patient will improve strength to stated goals, listed in objective measures above, in order to improve functional independence and quality of life. [x] Progressing  [] Met  [] Not Met  Partially Met  1/13/2023   GAIT: Patient will demonstrate normalized gait mechanics with minimal compensation in order to return to PLOF. [] Progressing  [x] Met  [] Not Met 1/13/2023    Patient will return to normal ADL's, IADL's, community involvement, recreational activities, and work-related activities with less than or equal to 0/10 pain and maximal function.  [x]  Progressing  [] Met  [] Not Met     Patient will return to recreational activities such as biking and participating in yoga without pain or limitation.  [x] Progressing  [] Met  [] Not Met         PLAN     Continue Plan of Care (POC) and progress per patient tolerance. See treatment section for details on planned progressions next session.    1/13/2023: It is my recommendation that patient continue with PT at her current frequency of 2 times per week for the remainder of her approved visits. Her treatment plan will remain the same, and she will be progressed appropriately.     12/13/2022 (evaluation): Outpatient Physical Therapy 2 times weekly for 12 weeks to include any combination of the following interventions: virtual visits, dry needling, modalities, electrical stimulation (IFC, Pre-Mod, Attended with Functional Dry Needling), Aquatic Therapy, Cervical/Lumbar Traction, Gait Training, Manual Therapy, Neuromuscular Re-ed, Patient Education, Self Care, Therapeutic Exercise, and Therapeutic Activites     Brittanie Clements, PT

## 2023-02-20 ENCOUNTER — PATIENT MESSAGE (OUTPATIENT)
Dept: REHABILITATION | Facility: HOSPITAL | Age: 58
End: 2023-02-20
Payer: COMMERCIAL

## 2023-02-21 ENCOUNTER — DOCUMENTATION ONLY (OUTPATIENT)
Dept: REHABILITATION | Facility: HOSPITAL | Age: 58
End: 2023-02-21
Payer: COMMERCIAL

## 2023-02-21 DIAGNOSIS — M25.561 ACUTE PAIN OF RIGHT KNEE: Primary | ICD-10-CM

## 2023-02-21 DIAGNOSIS — R68.89 DECREASED STRENGTH, ENDURANCE, AND MOBILITY: ICD-10-CM

## 2023-02-21 DIAGNOSIS — M25.661 DECREASED RANGE OF MOTION OF RIGHT KNEE: ICD-10-CM

## 2023-02-21 DIAGNOSIS — R53.1 DECREASED STRENGTH, ENDURANCE, AND MOBILITY: ICD-10-CM

## 2023-02-21 DIAGNOSIS — Z74.09 DECREASED STRENGTH, ENDURANCE, AND MOBILITY: ICD-10-CM

## 2023-02-21 NOTE — PROGRESS NOTES
[facility]  Physical Therapy Discharge Note    Name: Alma Alfonso [place]  Clinic Number: [id number]    Therapy Diagnosis:   Encounter Diagnoses   Name Primary?    Acute pain of right knee Yes    Decreased range of motion of right knee     Decreased strength, endurance, and mobility      Physician: Candis Romo PA-C      Physician Orders: PT Eval and Treat  Medical Diagnosis from Referral: acute pain of right knee  Evaluation Date: 12/13/2022      Date of Last visit: 2/10/2023  Total Visits Received: 10    ASSESSMENT      Patient messaged PT to say that bother her foot and knee are feeling better, and she is doing well with HEP. She feels ready for DC at this time.    Discharge reason: Patient is now asymptomatic and Patient has met all of his/her goals    Discharge FOTO Score: N/A Final FOTO not administered      Goals:  Short Term Goals:  6 weeks Status  Date Met   PAIN: Pt will report worst pain of 3/10 in order to progress toward max functional ability and improve quality of life. [] Progressing  [x] Met  [] Not Met 1/13/2023    FUNCTION: Patient will demonstrate improved function as indicated by a functional limitation score of less than or equal to 36 out of 100 on FOTO. [x] Progressing  [] Met  [] Not Met     STRENGTH: Patient will improve strength to 50% of stated goals, listed in objective measures above, in order to progress towards independence with functional activities.  [] Progressing  [x] Met  [] Not Met 1/13/2023    POSTURE: Patient will correct postural deviations in sitting and standing, to decrease pain and promote long term stability.  [] Progressing  [x] Met  [] Not Met 1/13/2023    GAIT: Patient will demonstrate improved gait mechanics in order to improve functional mobility, improve quality of life, and decrease risk of further injury or fall.  [] Progressing  [x] Met  [] Not Met 1/13/2023    HEP: Patient will demonstrate independence with HEP in order to  progress toward functional independence. [] Progressing  [x] Met  [] Not Met 1/13/2023       Long Term Goals:  12 weeks Status Date Met   PAIN: Pt will report worst pain of 2/10 in order to progress toward max functional ability and improve quality of life [x] Progressing  [] Met  [] Not Met     FUNCTION: Patient will demonstrate improved function as indicated by a functional limitation score of less than or equal to 27 out of 100 on FOTO. [x] Progressing  [] Met  [] Not Met     MOBILITY: Patient will improve AROM to stated goals, listed in objective measures above, in order to return to maximal functional potential and improve quality of life. [x] Progressing  [] Met  [] Not Met Partially Met  1/13/2023    STRENGTH: Patient will improve strength to stated goals, listed in objective measures above, in order to improve functional independence and quality of life. [x] Progressing  [] Met  [] Not Met  Partially Met  1/13/2023   GAIT: Patient will demonstrate normalized gait mechanics with minimal compensation in order to return to PLOF. [] Progressing  [x] Met  [] Not Met 1/13/2023    Patient will return to normal ADL's, IADL's, community involvement, recreational activities, and work-related activities with less than or equal to 0/10 pain and maximal function.  [x] Progressing  [] Met  [] Not Met     Patient will return to recreational activities such as biking and participating in yoga without pain or limitation.  [x] Progressing  [] Met  [] Not Met          PLAN   This patient is discharged from Physical Therapy      Brittanie Clements, PT

## 2023-03-17 ENCOUNTER — PATIENT MESSAGE (OUTPATIENT)
Dept: RESEARCH | Facility: HOSPITAL | Age: 58
End: 2023-03-17
Payer: COMMERCIAL

## 2023-04-04 ENCOUNTER — OFFICE VISIT (OUTPATIENT)
Dept: PULMONOLOGY | Facility: CLINIC | Age: 58
End: 2023-04-04
Payer: COMMERCIAL

## 2023-04-04 VITALS
BODY MASS INDEX: 27.01 KG/M2 | OXYGEN SATURATION: 98 % | HEIGHT: 61 IN | SYSTOLIC BLOOD PRESSURE: 122 MMHG | DIASTOLIC BLOOD PRESSURE: 74 MMHG | HEART RATE: 73 BPM | WEIGHT: 143.06 LBS | RESPIRATION RATE: 17 BRPM

## 2023-04-04 DIAGNOSIS — J30.89 NON-SEASONAL ALLERGIC RHINITIS DUE TO OTHER ALLERGIC TRIGGER: ICD-10-CM

## 2023-04-04 DIAGNOSIS — G47.33 OSA ON CPAP: Primary | ICD-10-CM

## 2023-04-04 DIAGNOSIS — E66.3 OVERWEIGHT (BMI 25.0-29.9): ICD-10-CM

## 2023-04-04 PROCEDURE — 99213 OFFICE O/P EST LOW 20 MIN: CPT | Mod: S$GLB,,, | Performed by: NURSE PRACTITIONER

## 2023-04-04 PROCEDURE — 99999 PR PBB SHADOW E&M-EST. PATIENT-LVL III: CPT | Mod: PBBFAC,,, | Performed by: NURSE PRACTITIONER

## 2023-04-04 PROCEDURE — 1159F PR MEDICATION LIST DOCUMENTED IN MEDICAL RECORD: ICD-10-PCS | Mod: CPTII,S$GLB,, | Performed by: NURSE PRACTITIONER

## 2023-04-04 PROCEDURE — 3078F DIAST BP <80 MM HG: CPT | Mod: CPTII,S$GLB,, | Performed by: NURSE PRACTITIONER

## 2023-04-04 PROCEDURE — 99999 PR PBB SHADOW E&M-EST. PATIENT-LVL III: ICD-10-PCS | Mod: PBBFAC,,, | Performed by: NURSE PRACTITIONER

## 2023-04-04 PROCEDURE — 3074F SYST BP LT 130 MM HG: CPT | Mod: CPTII,S$GLB,, | Performed by: NURSE PRACTITIONER

## 2023-04-04 PROCEDURE — 3008F PR BODY MASS INDEX (BMI) DOCUMENTED: ICD-10-PCS | Mod: CPTII,S$GLB,, | Performed by: NURSE PRACTITIONER

## 2023-04-04 PROCEDURE — 3078F PR MOST RECENT DIASTOLIC BLOOD PRESSURE < 80 MM HG: ICD-10-PCS | Mod: CPTII,S$GLB,, | Performed by: NURSE PRACTITIONER

## 2023-04-04 PROCEDURE — 3074F PR MOST RECENT SYSTOLIC BLOOD PRESSURE < 130 MM HG: ICD-10-PCS | Mod: CPTII,S$GLB,, | Performed by: NURSE PRACTITIONER

## 2023-04-04 PROCEDURE — 1160F PR REVIEW ALL MEDS BY PRESCRIBER/CLIN PHARMACIST DOCUMENTED: ICD-10-PCS | Mod: CPTII,S$GLB,, | Performed by: NURSE PRACTITIONER

## 2023-04-04 PROCEDURE — 3008F BODY MASS INDEX DOCD: CPT | Mod: CPTII,S$GLB,, | Performed by: NURSE PRACTITIONER

## 2023-04-04 PROCEDURE — 99213 PR OFFICE/OUTPT VISIT, EST, LEVL III, 20-29 MIN: ICD-10-PCS | Mod: S$GLB,,, | Performed by: NURSE PRACTITIONER

## 2023-04-04 PROCEDURE — 1159F MED LIST DOCD IN RCRD: CPT | Mod: CPTII,S$GLB,, | Performed by: NURSE PRACTITIONER

## 2023-04-04 PROCEDURE — 1160F RVW MEDS BY RX/DR IN RCRD: CPT | Mod: CPTII,S$GLB,, | Performed by: NURSE PRACTITIONER

## 2023-04-04 NOTE — ASSESSMENT & PLAN NOTE
Benefits and compliant with Auto CPAP  5-20 cm with optimal control AHI 2.0  Full face mask  HME: [facility]   ResVent AutoPAP--No Modem  Set Up Date: 10/4/22  On Auto CPAP 6-12 cm optimal AHI 0.9  Nasal wisp mask  HME [facility]   Follow up annually

## 2023-04-04 NOTE — PROGRESS NOTES
Subjective:      Patient ID: Alma Vivar is a 57 y.o. female.    Patient Active Problem List   Diagnosis    Left hand weakness    Swelling of left hand    COLTEN on CPAP    Allergic rhinitis    Overweight (BMI 25.0-29.9)       she has been referred by Self, Brianna for evaluation and management for   Chief Complaint   Patient presents with    Sleep Apnea              Chief Complaint: Sleep Apnea (/)    HPI:    HPI: Alma Vivar is here for follow up for COLTEN and CPAP complaince assessment.   She is on Auto CPAP of 6 - 12 cmH2O pressure which is optimally controlling sleep apnea with apneic index (AHI) 0.9 events an hour.   She is compliant with CPAP use. Complaince download today reveals 85% of days with greater than 4 hours of device use. 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   Patient reports benefit from CPAP use and denies snoring and excessive daytime sleepiness, awakens more refreshed and fatigued if does not wear CPAP.   Patient reports complaint of mask leak and would like more air at start up . Nasal wisp mask is used.      [facility]  ResVent - iBreeze AutoPAP--No Modem  Set Up Date: 10/4/22    8/14/2022 Home Sleep Study  1 night study MILD/BORDERLINE OBSTRUCTIVE SLEEP APNEA with overall AHI 9.3/hr ( 69 events): night #1. Oxygen desaturation: < 70 %. SpO2 between 90% to 94% for 1 hr 14 min.    12/16/2022 changed in clinic to Auto CPAP 6-12  cm since patient complaining of mask leak at times and wanted more air at start up.         [place] Sleepiness Scale   EPWORTH SLEEPINESS SCALE 4/4/2023 12/16/2022 9/23/2022   Sitting and reading 0 0 0   Watching TV 0 0 1   Sitting, inactive in a public place (e.g. a theatre or a meeting) 0 0 0   As a passenger in a car for an hour without a break 3 2 3   Lying down to rest in the afternoon when circumstances permit 1 0 3    Sitting and talking to someone 0 0 0   Sitting quietly after a lunch without alcohol 0 0 0   In a car, while stopped for a few minutes in traffic 0 0 0   Total score 4 2 7     Previous Report Reviewed: lab reports and office notes     Past Medical History: The following portions of the patient's history were reviewed and updated as appropriate:   She  has a past surgical history that includes Hysterectomy and  section.  Her family history includes Bipolar disorder in her father; Cancer in her maternal grandmother; Cirrhosis in her father; Depression in her paternal grandmother; Hypertension in her mother; Kidney failure in her father; Mental illness in her father; Ovarian cancer in her maternal grandmother.  She  reports that she has never smoked. She has never been exposed to tobacco smoke. She has never used smokeless tobacco. She reports current alcohol use of about 1.0 standard drink per week. She reports that she does not use drugs.  She has a current medication list which includes the following prescription(s): epinephrine and fexofenadine.  She is allergic to tomato (solanum lycopersicum), cpd vehicle sol.sugarfree no.1, shellfish containing products, sulfites, tomato, and sulfa (sulfonamide antibiotics)..    Review of Systems   Constitutional:  Negative for fever, chills, weight loss, weight gain, activity change, appetite change, fatigue and night sweats.   HENT:  Negative for postnasal drip, rhinorrhea, sinus pressure, voice change and congestion.    Eyes:  Negative for redness and itching.   Respiratory:  Negative for apnea, snoring, cough, sputum production, chest tightness, shortness of breath, wheezing, orthopnea, asthma nighttime symptoms, dyspnea on extertion, use of rescue inhaler and somnolence.    Cardiovascular: Negative.  Negative for chest pain, palpitations and leg swelling.   Genitourinary:  Negative for difficulty urinating and hematuria.   Endocrine:  Negative for cold intolerance  "and heat intolerance.    Musculoskeletal:  Negative for arthralgias, gait problem, joint swelling and myalgias.   Skin: Negative.    Gastrointestinal:  Negative for nausea, vomiting, abdominal pain and acid reflux.   Neurological:  Negative for dizziness, weakness, light-headedness and headaches.   Hematological:  Negative for adenopathy. No excessive bruising.   All other systems reviewed and are negative.   Objective:   /74   Pulse 73   Resp 17   Ht 5' 1" (1.549 m)   Wt 64.9 kg (143 lb 1.3 oz)   SpO2 98%   BMI 27.03 kg/m²   Physical Exam  Vitals and nursing note reviewed.   Constitutional:       General: She is not in acute distress.     Appearance: Normal appearance. She is well-developed. She is not ill-appearing or toxic-appearing.   HENT:      Head: Normocephalic.      Right Ear: External ear normal.      Left Ear: External ear normal.      Nose: Nose normal.      Mouth/Throat:      Pharynx: No oropharyngeal exudate.   Eyes:      Conjunctiva/sclera: Conjunctivae normal.   Cardiovascular:      Rate and Rhythm: Normal rate and regular rhythm.      Heart sounds: Normal heart sounds.   Pulmonary:      Effort: Pulmonary effort is normal.      Breath sounds: Normal breath sounds. No stridor.   Abdominal:      Palpations: Abdomen is soft.   Musculoskeletal:         General: Normal range of motion.      Cervical back: Normal range of motion and neck supple.   Lymphadenopathy:      Cervical: No cervical adenopathy.   Skin:     General: Skin is warm and dry.   Neurological:      Mental Status: She is alert and oriented to person, place, and time.   Psychiatric:         Behavior: Behavior normal. Behavior is cooperative.         Thought Content: Thought content normal.         Judgment: Judgment normal.       Personal Diagnostic Review  Review of labs, xray's, cardiology reports.     Assessment:     1. COLTEN on CPAP    2. Non-seasonal allergic rhinitis due to other allergic trigger    3. Overweight (BMI " 25.0-29.9)        Orders Placed This Encounter   Procedures    CPAP/BIPAP SUPPLIES     Benefits and compliant  90 day supply. 4 refills  HME: [facility]     Order Specific Question:   Length of need (1-99 months):     Answer:   99     Order Specific Question:   Choose ONE mask type and its corresponding cushions and/or pillows:     Answer:    Nasal Mask, 1 per 90 days:  Nasal Cushions, (6 per 90 days):  Nasal Pillows, (6 per 90 days)     Order Specific Question:   Choose EITHER Heated or Non-Heated Tubjing     Answer:    Non-Heated Tubing, 1 per 90 days     Order Specific Question:   Number of Days Needed:     Answer:   99     Order Specific Question:   All other supplies as needed as listed below:     Answer:    Headgear, 1 per 180 days     Order Specific Question:   All other supplies as needed as listed below:     Answer:    Chin Strap, 1 per 180 days     Order Specific Question:   All other supplies as needed as listed below:     Answer:    Disposable Filter, 6 per 90 days     Order Specific Question:   All other supplies as needed as listed below:     Answer:    Humidifier Chamber, 1 per 180 days     Order Specific Question:   All other supplies as needed as listed below:     Answer:    Non-Disposable Filter, 1 per 180 days     Plan:   Discussed diagnosis, its evaluation, treatment and usual course. All questions answered.  Problem List Items Addressed This Visit       Overweight (BMI 25.0-29.9)     Continue to encourage exercise and dietary modification to obtain normal weight.   Wt Readings from Last 9 Encounters:   04/04/23 64.9 kg (143 lb 1.3 oz)   01/30/23 65.3 kg (143 lb 15.4 oz)   12/16/22 63.4 kg (139 lb 12.4 oz)   12/06/22 63.9 kg (140 lb 14 oz)   11/28/22 63.7 kg (140 lb 6.9 oz)   11/18/22 64.5 kg (142 lb 3.2 oz)   10/11/22 58.5 kg (129 lb)   09/27/22 63.6 kg (140 lb 3.4 oz)   09/23/22 63.8 kg (140 lb 10.5 oz)   Body mass index is 27.03 kg/m².           COLTEN on  CPAP - Primary     Benefits and compliant with Auto CPAP  5-20 cm with optimal control AHI 2.0  Full face mask  HME: [facility]   ResVent AutoPAP--No Modem  Set Up Date: 10/4/22  On Auto CPAP 6-12 cm optimal AHI 0.9  Nasal wisp mask  HME [facility]   Follow up annually              Relevant Orders    CPAP/BIPAP SUPPLIES    Allergic rhinitis     Controlled on allegra daily. Fluticasone as needed            I spent a total of 25 minutes on the day of the visit.  This includes face to face time and non-face to face time preparing to see the patient (eg, review of tests), obtaining and/or reviewing separately obtained history, documenting clinical information in the electronic or other health record, independently interpreting results and communicating results to the patient/family/caregiver, or care coordinator.    Follow up in about 1 year (around 4/4/2024) for CPAP 1 year compliance download.    Thank you for the opportunity to participate in the care of this patient.

## 2023-04-04 NOTE — ASSESSMENT & PLAN NOTE
Continue to encourage exercise and dietary modification to obtain normal weight.   Wt Readings from Last 9 Encounters:   04/04/23 64.9 kg (143 lb 1.3 oz)   01/30/23 65.3 kg (143 lb 15.4 oz)   12/16/22 63.4 kg (139 lb 12.4 oz)   12/06/22 63.9 kg (140 lb 14 oz)   11/28/22 63.7 kg (140 lb 6.9 oz)   11/18/22 64.5 kg (142 lb 3.2 oz)   10/11/22 58.5 kg (129 lb)   09/27/22 63.6 kg (140 lb 3.4 oz)   09/23/22 63.8 kg (140 lb 10.5 oz)   Body mass index is 27.03 kg/m².

## 2023-06-05 DIAGNOSIS — R94.31 ABNORMAL ECG DURING EXERCISE STRESS TEST: ICD-10-CM

## 2023-06-05 DIAGNOSIS — R07.9 CHEST PAIN, UNSPECIFIED TYPE: Primary | ICD-10-CM

## 2023-06-06 ENCOUNTER — OFFICE VISIT (OUTPATIENT)
Dept: CARDIOLOGY | Facility: CLINIC | Age: 58
End: 2023-06-06
Payer: COMMERCIAL

## 2023-06-06 ENCOUNTER — HOSPITAL ENCOUNTER (OUTPATIENT)
Dept: CARDIOLOGY | Facility: HOSPITAL | Age: 58
Discharge: HOME | End: 2023-06-06
Attending: STUDENT IN AN ORGANIZED HEALTH CARE EDUCATION/TRAINING PROGRAM
Payer: COMMERCIAL

## 2023-06-06 VITALS
HEIGHT: 61 IN | OXYGEN SATURATION: 98 % | WEIGHT: 141.75 LBS | SYSTOLIC BLOOD PRESSURE: 120 MMHG | BODY MASS INDEX: 26.76 KG/M2 | DIASTOLIC BLOOD PRESSURE: 70 MMHG | HEART RATE: 69 BPM

## 2023-06-06 DIAGNOSIS — R07.9 CHEST PAIN, UNSPECIFIED TYPE: ICD-10-CM

## 2023-06-06 DIAGNOSIS — R94.31 ABNORMAL ECG DURING EXERCISE STRESS TEST: ICD-10-CM

## 2023-06-06 DIAGNOSIS — G47.33 OSA ON CPAP: ICD-10-CM

## 2023-06-06 DIAGNOSIS — E78.00 ELEVATED LDL CHOLESTEROL LEVEL: Primary | ICD-10-CM

## 2023-06-06 PROCEDURE — 99213 PR OFFICE/OUTPT VISIT, EST, LEVL III, 20-29 MIN: ICD-10-PCS | Mod: S$GLB,,, | Performed by: STUDENT IN AN ORGANIZED HEALTH CARE EDUCATION/TRAINING PROGRAM

## 2023-06-06 PROCEDURE — 93010 ELECTROCARDIOGRAM REPORT: CPT | Mod: ,,, | Performed by: INTERNAL MEDICINE

## 2023-06-06 PROCEDURE — 3078F DIAST BP <80 MM HG: CPT | Mod: CPTII,S$GLB,, | Performed by: STUDENT IN AN ORGANIZED HEALTH CARE EDUCATION/TRAINING PROGRAM

## 2023-06-06 PROCEDURE — 99999 PR PBB SHADOW E&M-EST. PATIENT-LVL III: ICD-10-PCS | Mod: PBBFAC,,, | Performed by: STUDENT IN AN ORGANIZED HEALTH CARE EDUCATION/TRAINING PROGRAM

## 2023-06-06 PROCEDURE — 93010 EKG 12-LEAD: ICD-10-PCS | Mod: ,,, | Performed by: INTERNAL MEDICINE

## 2023-06-06 PROCEDURE — 1159F MED LIST DOCD IN RCRD: CPT | Mod: CPTII,S$GLB,, | Performed by: STUDENT IN AN ORGANIZED HEALTH CARE EDUCATION/TRAINING PROGRAM

## 2023-06-06 PROCEDURE — 93005 ELECTROCARDIOGRAM TRACING: CPT | Mod: PO

## 2023-06-06 PROCEDURE — 3078F PR MOST RECENT DIASTOLIC BLOOD PRESSURE < 80 MM HG: ICD-10-PCS | Mod: CPTII,S$GLB,, | Performed by: STUDENT IN AN ORGANIZED HEALTH CARE EDUCATION/TRAINING PROGRAM

## 2023-06-06 PROCEDURE — 3008F BODY MASS INDEX DOCD: CPT | Mod: CPTII,S$GLB,, | Performed by: STUDENT IN AN ORGANIZED HEALTH CARE EDUCATION/TRAINING PROGRAM

## 2023-06-06 PROCEDURE — 1159F PR MEDICATION LIST DOCUMENTED IN MEDICAL RECORD: ICD-10-PCS | Mod: CPTII,S$GLB,, | Performed by: STUDENT IN AN ORGANIZED HEALTH CARE EDUCATION/TRAINING PROGRAM

## 2023-06-06 PROCEDURE — 3074F SYST BP LT 130 MM HG: CPT | Mod: CPTII,S$GLB,, | Performed by: STUDENT IN AN ORGANIZED HEALTH CARE EDUCATION/TRAINING PROGRAM

## 2023-06-06 PROCEDURE — 99999 PR PBB SHADOW E&M-EST. PATIENT-LVL III: CPT | Mod: PBBFAC,,, | Performed by: STUDENT IN AN ORGANIZED HEALTH CARE EDUCATION/TRAINING PROGRAM

## 2023-06-06 PROCEDURE — 3008F PR BODY MASS INDEX (BMI) DOCUMENTED: ICD-10-PCS | Mod: CPTII,S$GLB,, | Performed by: STUDENT IN AN ORGANIZED HEALTH CARE EDUCATION/TRAINING PROGRAM

## 2023-06-06 PROCEDURE — 3074F PR MOST RECENT SYSTOLIC BLOOD PRESSURE < 130 MM HG: ICD-10-PCS | Mod: CPTII,S$GLB,, | Performed by: STUDENT IN AN ORGANIZED HEALTH CARE EDUCATION/TRAINING PROGRAM

## 2023-06-06 PROCEDURE — 99213 OFFICE O/P EST LOW 20 MIN: CPT | Mod: S$GLB,,, | Performed by: STUDENT IN AN ORGANIZED HEALTH CARE EDUCATION/TRAINING PROGRAM

## 2023-06-06 NOTE — PROGRESS NOTES
Section of Cardiology                  Cardiac Clinic Note    Chief Complaint/Reason for consultation:  Chest pain      HPI:   Alma Vivar is a 57 y.o. female with h/o no significant PMH who was referred to cardiology clinic by ADELAIDA Romo for evaluation.      7/26/2022  Has been seen in the ED for chest pain/shortness of breath symptoms x2, negative workup  First episode of chest pain was in [place], about 3 weeks ago, had a pulling sensation in her chest with SOB (can't take deep breaths)  Got poison ivy (allergic) anhad to take prednisone (symptoms started when taking prednisone) for 2 weeks   Other symptoms occur with waking up   Get LH at time and palpitations also  Symptoms last hours   Symptoms don't worsen with activity  Exercises regularly, but has stopped due to symptoms, at least 4 days a week   She is an E[employer] speciality, travels for work and family    Active at home and generally     denies tobacco abuse. ETOH occ  Family hx: HTN, cancer     Denies syncope.  Denies DM, HTN, CVA      8/30/22  ETT 7/22 abnormal   Nuclear stress test 8/22 without ischemia- normal exercise capacity  Echocardiogram with normal EF  Blood pressure on a nuclear stress at rest 142/80  Has not had any chest pain  BP normal today   Would like to start exercising again- was holding off until results came back  Being worked up for sleep apnea     Denies SOB, syncope, orthopnea        12/6/22  Doing well  Weight decreased 2 lbs  Has been active  Started on CPAP for COLTEN  Says she's sleeping better, has more engergy    Denies chest pain, SOB      6/6/23  Doing well  Intermittent palpitations as night while laying down, after eating  Lost 2 lbs since 1/23  Curious about her cholesterol   Has been changing diet   Tolerating CPAP- reports sleeping better     Denies chest pain, SOB      EKG 6/6/23 NSR  EKG 7/20/2022 NSR, no acute ST - T wave changes    ECHO  7/22  The left ventricle is normal in size with normal systolic  function.  The estimated ejection fraction is 65%.  Normal left ventricular diastolic function.  Normal right ventricular size with normal right ventricular systolic function.  Mild tricuspid regurgitation.  Normal central venous pressure (3 mmHg).  The estimated PA systolic pressure is 21 mmHg.    STRESS TEST    [facility]      ROS: All 10 systems reviewed. Please refer to the HPI for pertinent positives. All other systems negative.     Past Medical History  Past Medical History:   Diagnosis Date    Abnormal glandular Papanicolaou smear of cervix 2018 10:09:15 AM    [facility]: Abnormal PAP Smear-No Additional Notes    Abnormal glandular Papanicolaou smear of cervix 2018 10:09:15 AM    [facility] - [facility]: Abnormal PAP Smear-No Additional Notes    Hormone replacement therapy (HRT)     Human papillomavirus in conditions classified elsewhere and of unspecified site 10/6/2020 8:36:36 AM    [facility] - [facility]: HPV in female-No Additional Notes    Human papillomavirus in conditions classified elsewhere and of unspecified site 10/6/2020 8:36:36 AM    [facility]: HPV in female-No Additional Notes    Hyperlipidemia     Other specified noninflammatory disorder of vagina 2018 9:52:03 AM    [facility]: Vaginal lesion-No Additional Notes    Other specified noninflammatory disorder of vagina 2018 9:52:03 AM    [facility] - [facility]: Vaginal Lesion-No Additional Notes       Surgical History  Past Surgical History:   Procedure Laterality Date     SECTION      HYSTERECTOMY            Allergies:   Review of patient's allergies indicates:   Allergen Reactions    Tomato (solanum lycopersicum) Anaphylaxis    Cpd vehicle sol.sugarfree no.1      Agave    Shellfish containing products Itching    Sulfites     Tomato     Sulfa (sulfonamide antibiotics) Rash       Social History:  Social History      Socioeconomic History    Marital status:      Spouse name: Roger    Number of children: 2   Occupational History     Comment: .    Tobacco Use    Smoking status: Never     Passive exposure: Never    Smokeless tobacco: Never   Substance and Sexual Activity    Alcohol use: Yes     Alcohol/week: 1.0 standard drink     Types: 1 Glasses of wine per week     Comment: wine    Drug use: No    Sexual activity: Not Currently     Social Determinants of Health     Financial Resource Strain: Unknown    Difficulty of Paying Living Expenses: Patient refused   Food Insecurity: Unknown    Worried About Running Out of Food in the Last Year: Patient refused    Ran Out of Food in the Last Year: Patient refused   Transportation Needs: Unknown    Lack of Transportation (Medical): Patient refused    Lack of Transportation (Non-Medical): Patient refused   Physical Activity: Unknown    Days of Exercise per Week: Patient refused   Stress: Unknown    Feeling of Stress : Patient refused   Social Connections: Unknown    Frequency of Communication with Friends and Family: Patient refused    Frequency of Social Gatherings with Friends and Family: Patient refused    Active Member of Clubs or Organizations: Patient refused    Attends Club or Organization Meetings: Patient refused    Marital Status:    Housing Stability: Unknown    Unable to Pay for Housing in the Last Year: Patient refused    Number of Places Lived in the Last Year: 1    Unstable Housing in the Last Year: Patient refused       Family History:  family history includes Bipolar disorder in her father; Cancer in her maternal grandmother; Cirrhosis in her father; Depression in her paternal grandmother; Hypertension in her mother; Kidney failure in her father; Mental illness in her father; Ovarian cancer in her maternal grandmother.    Home Medications:  Current Outpatient Medications on File Prior to Visit   Medication Sig Dispense Refill     "EPINEPHrine (EPIPEN) 0.3 mg/0.3 mL AtIn Inject 0.3 mLs (0.3 mg total) into the muscle once. If symptoms not improved repeat in about 5-15 minutes - inject a second dose (pen) of 0.3 mL into muscle once. for 1 dose 2 each 2    fexofenadine (ALLEGRA) 180 MG tablet Take 180 mg by mouth once daily.       No current facility-administered medications on file prior to visit.       Physical exam:  /70   Pulse 69   Ht 5' 1" (1.549 m)   Wt 64.3 kg (141 lb 12.1 oz)   SpO2 98%   BMI 26.78 kg/m²         General: Pt is a 57 y.o. year old female who is AAOx3, in NAD, is pleasant, well nourished, looks stated age  HEENT: PERRL, EOMI, Oral mucosa pink & moist  CVS  No abnormal cardiac pulsations noted on inspection. JVP not raised. The apical impulse is normal on palpation, and is located in the left 5th intercostal space in the mid - clavicular line. No palpable thrills or abnormal pulsations noted. RR, S1 - S2 heard, no murmurs, rubs or gallops appreciated.   PUL : CTA B/L. No wheezes/crackles heard   ABD : BS +, soft. No tenderness elicited   LE : No C/C/E. Distal Pulses palpable B/L         LABS:    Chemistry:   Lab Results   Component Value Date     07/20/2022    K 5.0 07/20/2022     07/20/2022    CO2 23 07/20/2022    BUN 17 07/20/2022    CREATININE 0.9 07/20/2022    CALCIUM 9.2 07/20/2022     Cardiac Markers:   Lab Results   Component Value Date    TROPONINI <0.006 07/20/2022     Cardiac Markers (Last 3):   Lab Results   Component Value Date    TROPONINI <0.006 07/20/2022     CBC:   Lab Results   Component Value Date    WBC 5.61 09/27/2022    HGB 13.4 09/27/2022    HCT 41.6 09/27/2022    MCV 94 09/27/2022     09/27/2022     Lipids:   Lab Results   Component Value Date    CHOL 213 (H) 01/06/2023    TRIG 142 01/06/2023    HDL 54 01/06/2023    HDL 51 08/07/2015     Coagulation: No results found for: PT, INR, APTT        Assessment      1. Elevated LDL cholesterol level    2. COLTEN on CPAP    3. Chest " pain, unspecified type               Plan:     Chest pain- resolved  ETT 7/22 abnormal   Nuclear stress test 8/22 without ischemia  Echocardiogram with normal EF    COLTEN  continue CPAP machine    Elevated LDL level  Reports diet and exercise, does not eat much protein  10 year ASCVD risk 2.3%  Repeat lipid panel     This note was prepared using voice recognition system and is likely to have sound alike errors that may have been overlooked even after proofreading.     I have reviewed all pertinent chart information.  Plans and recommendations have been formulated under my direct supervision. All questions answered and patient voiced understanding.   If symptoms persist go to the ED.    RTC in 1 year         Letty Matthews MD  Cardiology

## 2023-06-09 ENCOUNTER — LAB VISIT (OUTPATIENT)
Dept: LAB | Facility: HOSPITAL | Age: 58
End: 2023-06-09
Attending: STUDENT IN AN ORGANIZED HEALTH CARE EDUCATION/TRAINING PROGRAM
Payer: COMMERCIAL

## 2023-06-09 DIAGNOSIS — E78.00 ELEVATED LDL CHOLESTEROL LEVEL: ICD-10-CM

## 2023-06-09 LAB
CHOLEST SERPL-MCNC: 216 MG/DL (ref 120–199)
CHOLEST/HDLC SERPL: 4 {RATIO} (ref 2–5)
HDLC SERPL-MCNC: 54 MG/DL (ref 40–75)
HDLC SERPL: 25 % (ref 20–50)
LDLC SERPL CALC-MCNC: 143 MG/DL (ref 63–159)
NONHDLC SERPL-MCNC: 162 MG/DL
TRIGL SERPL-MCNC: 95 MG/DL (ref 30–150)

## 2023-06-09 PROCEDURE — 80061 LIPID PANEL: CPT | Performed by: STUDENT IN AN ORGANIZED HEALTH CARE EDUCATION/TRAINING PROGRAM

## 2023-06-09 PROCEDURE — 36415 COLL VENOUS BLD VENIPUNCTURE: CPT | Mod: PO | Performed by: STUDENT IN AN ORGANIZED HEALTH CARE EDUCATION/TRAINING PROGRAM

## 2023-06-13 NOTE — PROGRESS NOTES
[facility]   Physical Therapy Treatment Note     Name: Alma Alfonso Celina  Clinic Number: [id number]    Therapy Diagnosis:   Encounter Diagnoses   Name Primary?    Acute pain of right knee Yes    Decreased range of motion of right knee     Decreased strength, endurance, and mobility        Physician: Candis Romo PA-C    Visit Date: 1/4/2023    Physician Orders: PT Eval and Treat  Medical Diagnosis from Referral: acute pain of right knee  Evaluation Date: 12/13/2022  Authorization Period Expiration: 12/31/2022  Plan of Care Expiration: 3/13/2023  Progress Note Due: 1/12/2023  Visit # / Visits authorized: 1/25 (+eval)   FOTO: 1/3 (last performed on 12/13/2022)     Precautions: Standard    PTA Visit #: 0/5     Time In: 0817  Time Out: 0900  Total Billable Time: 40 minutes (denotes billable time)    SUBJECTIVE     Patient reports: that her knees are not really bothering her right now. Patient reports that her plantar fasciitis has really flared up within the last few weeks. Patient reports that her pain is significant enough to where she can barely walk by the end of the day.     She was compliant with home exercise program.  Response to previous treatment: no adverse reaction  Functional change: none noted yet    Pain: 5/10   (bilateral plantar fascia)  Location: right knee (0/10)    OBJECTIVE     Objective Measures updated at progress report only unless specified.     TREATMENT     Alma received the treatments listed below:     MANUAL THERAPY TECHNIQUES were applied for (15) minutes, including:    Manual Intervention Performed Today    Soft Tissue Mobilization [x] right quadriceps, medial hamstrings, and hip adductors , bilateral plantar fascia    Joint Mobilizations []     []     []    Functional Dry Needling  []      Plan for Next Visit: Continue as needed     THERAPEUTIC EXERCISES to develop strength, endurance, ROM, flexibility, posture, and core stabilization for (15)  minutes including:    Intervention Performed Today    Upright bike for ROM and strength x 5 minues, level 6   SLR - flexion, abduction, circles forward/backward x x 15 each LE 2 lbs (added weight)      bridges x 3 x 10 with theraball (progressed today)   LAQ  3 x 10, each LE   Shuttle (added) x Double Leg 5 bands 3x10 reps                       Plan for Next Visit:      NEUROMUSCULAR RE-EDUCATION ACTIVITIES to improve Balance, Coordination, Kinesthetic, Sense, Proprioception, and Posture for (10) minutes.  The following were included:    Intervention Performed Today    Standing TKE  30x, each LE, purple band   Standing hip 3-way  15x each LE                       Towel Curls (added) x X20 reps    Toe Yoga (added) x X15 reps each direction B requiring UE use for overpressure      Plan for Next Visit:        PATIENT EDUCATION AND HOME EXERCISES     Home Exercises Provided and Patient Education Provided     Education provided:   PURPOSE: Patient educated on the impairments noted above and the effects of physical therapy intervention to improve overall condition and QOL.   EXERCISE: Patient was educated on all the above exercise prior/during/after for proper posture, positioning, and execution for safe performance with home exercise program.   STRENGTH: Patient educated on the importance of improved core and extremity strength in order to improve alignment of the spine and extremities with static positions and dynamic movement.     Written Home Exercises Provided: yes.  Exercises were reviewed and Alma was able to demonstrate them prior to the end of the session.  Alma demonstrated good  understanding of the education provided. See EMR under Patient Instructions for exercises provided during therapy sessions.    ASSESSMENT     Patient tolerated treatment well, addressing reported plantar fasciitis with soft tissue mobilizations with some improvements noted along with education on proper form and sequencing of  exercises to assist with improving this pain long term. Progressed hip strengthening to incorporate a few additional exercises along with incorporating shuttle squats. Updated HEP to reflect new exercises for feet.     Alma is progressing well towards her goals.   Pt prognosis is Good.     Pt will continue to benefit from skilled outpatient physical therapy to address the deficits listed in the problem list box on initial evaluation, provide pt/family education and to maximize pt's level of independence in the home and community environment.     Pt's spiritual, cultural and educational needs considered and pt agreeable to plan of care and goals.     Anticipated Barriers for therapy: none    Goals:  Short Term Goals:  6 weeks Status  Date Met   PAIN: Pt will report worst pain of 3/10 in order to progress toward max functional ability and improve quality of life. [x] Progressing  [] Met  [] Not Met     FUNCTION: Patient will demonstrate improved function as indicated by a functional limitation score of less than or equal to 36 out of 100 on FOTO. [x] Progressing  [] Met  [] Not Met     STRENGTH: Patient will improve strength to 50% of stated goals, listed in objective measures above, in order to progress towards independence with functional activities.  [x] Progressing  [] Met  [] Not Met     POSTURE: Patient will correct postural deviations in sitting and standing, to decrease pain and promote long term stability.  [x] Progressing  [] Met  [] Not Met     GAIT: Patient will demonstrate improved gait mechanics in order to improve functional mobility, improve quality of life, and decrease risk of further injury or fall.  [x] Progressing  [] Met  [] Not Met     HEP: Patient will demonstrate independence with HEP in order to progress toward functional independence. [x] Progressing  [] Met  [] Not Met        Long Term Goals:  12 weeks Status Date Met   PAIN: Pt will report worst pain of 2/10 in order to progress  toward max functional ability and improve quality of life [x] Progressing  [] Met  [] Not Met     FUNCTION: Patient will demonstrate improved function as indicated by a functional limitation score of less than or equal to 27 out of 100 on FOTO. [x] Progressing  [] Met  [] Not Met     MOBILITY: Patient will improve AROM to stated goals, listed in objective measures above, in order to return to maximal functional potential and improve quality of life. [x] Progressing  [] Met  [] Not Met     STRENGTH: Patient will improve strength to stated goals, listed in objective measures above, in order to improve functional independence and quality of life. [x] Progressing  [] Met  [] Not Met     GAIT: Patient will demonstrate normalized gait mechanics with minimal compensation in order to return to PLOF. [x] Progressing  [] Met  [] Not Met     Patient will return to normal ADL's, IADL's, community involvement, recreational activities, and work-related activities with less than or equal to 0/10 pain and maximal function.  [x] Progressing  [] Met  [] Not Met     Patient will return to recreational activities such as biking and participating in yoga without pain or limitation.  [x] Progressing  [] Met  [] Not Met         PLAN     Continue Plan of Care (POC) and progress per patient tolerance. See treatment section for details on planned progressions next session.    12/13/2022 (evaluation): Outpatient Physical Therapy 2 times weekly for 12 weeks to include any combination of the following interventions: virtual visits, dry needling, modalities, electrical stimulation (IFC, Pre-Mod, Attended with Functional Dry Needling), Aquatic Therapy, Cervical/Lumbar Traction, Gait Training, Manual Therapy, Neuromuscular Re-ed, Patient Education, Self Care, Therapeutic Exercise, and Therapeutic Activites     Precoius Marshall, PT       ED Admission

## 2023-06-14 ENCOUNTER — PATIENT OUTREACH (OUTPATIENT)
Dept: ADMINISTRATIVE | Facility: HOSPITAL | Age: 58
End: 2023-06-14
Payer: COMMERCIAL

## 2023-06-23 ENCOUNTER — PATIENT MESSAGE (OUTPATIENT)
Dept: OPHTHALMOLOGY | Facility: CLINIC | Age: 58
End: 2023-06-23
Payer: COMMERCIAL

## 2023-06-23 ENCOUNTER — OFFICE VISIT (OUTPATIENT)
Dept: OPHTHALMOLOGY | Facility: CLINIC | Age: 58
End: 2023-06-23
Payer: COMMERCIAL

## 2023-06-23 DIAGNOSIS — H52.4 HYPEROPIA WITH ASTIGMATISM AND PRESBYOPIA, BILATERAL: ICD-10-CM

## 2023-06-23 DIAGNOSIS — H52.203 HYPEROPIA WITH ASTIGMATISM AND PRESBYOPIA, BILATERAL: ICD-10-CM

## 2023-06-23 DIAGNOSIS — H52.03 HYPEROPIA WITH ASTIGMATISM AND PRESBYOPIA, BILATERAL: ICD-10-CM

## 2023-06-23 DIAGNOSIS — Z01.00 ENCOUNTER FOR EYE EXAM: Primary | ICD-10-CM

## 2023-06-23 PROCEDURE — 99999 PR PBB SHADOW E&M-EST. PATIENT-LVL II: CPT | Mod: PBBFAC,,, | Performed by: OPTOMETRIST

## 2023-06-23 PROCEDURE — 92004 COMPRE OPH EXAM NEW PT 1/>: CPT | Mod: S$GLB,,, | Performed by: OPTOMETRIST

## 2023-06-23 PROCEDURE — 99999 PR PBB SHADOW E&M-EST. PATIENT-LVL II: ICD-10-PCS | Mod: PBBFAC,,, | Performed by: OPTOMETRIST

## 2023-06-23 PROCEDURE — 92004 PR EYE EXAM, NEW PATIENT,COMPREHESV: ICD-10-PCS | Mod: S$GLB,,, | Performed by: OPTOMETRIST

## 2023-06-23 NOTE — PROGRESS NOTES
HPI    NP to DKT  Patient here today for yearly eye exam  Vision changes since last eye exam?: Yes at near  Wears OTC readers     Any eye pain today: No    Other ocular symptoms: No    Interested in contact lens fitting today? No              Last edited by Nita Martin, PCT on 6/23/2023  8:27 AM.            Assessment /Plan     For exam results, see Encounter Report.    Encounter for eye exam  Normal fundus exam.  RD flat & intact, no holes or tears.  Crx given, wear as needed.    Hyperopia with astigmatism and presbyopia, bilateral   Eyeglass Final Rx       Eyeglass Final Rx         Sphere Cylinder Axis Add    Right +0.25 +0.25 011 +2.00    Left +0.25 +0.75 011 +2.00      Type: PAL    Expiration Date: 6/23/2024                  Ok to wear OTC readers +2.00.       RTC 1 yr for dilated eye exam or sooner if any changes to vision.   Discussed above and answered questions.

## 2023-10-25 NOTE — PROGRESS NOTES
Patient ID: Alma Vivar  YOB: 1965  MRN: [id number]    Chief Complaint: Pain of the Right Foot and Pain of the Left Foot    Referred By: Brittanie Clements for bilateral feet pain    History of Present Illness: Alma Vivar is a 57 y.o. female who presents today with acute bilateral feet pain right worse than left..     The patient is active in  PT .  Occupation:     Alma Vivar states it is Acute in nature and there was not a specific mechanism. Pain began about one month ago while she was on her feet a lot during the holidays. She has been seeing PT at the [facility] for her knee who has started to treat her bilateral feet for plantar fascia as well.  Alma Vivar describes the pain as a continuous throb and sharp pain at medial forefoot and plantar hindfoot. Treatment to date includes limited PT sessions. They believe that they are unchanged with this treatment. Current pain level at rest is 0/10, pain level at worst is 5/10 (Numeric Pain Rating Scale).  Associated symptoms include: Swelling No, Instability No, Pain that affects your sleep No, Mechanical No, locking/catching No, Neurological No, limited range of motion Yes.     Aggravating activities include first step in AM, walking after sitting for a while, heel walk.   Alleviating activities include limited use and seating.     They denies formal physical therapy for this.     Hemoglobin A1C   Date Value Ref Range Status   07/19/2022 5.2 4.0 - 5.6 % Final     Comment:     ADA Screening Guidelines:  5.7-6.4%  Consistent with prediabetes  >or=6.5%  Consistent with diabetes    High levels of fetal hemoglobin interfere with the HbA1C  assay. Heterozygous hemoglobin variants (HbS, HgC, etc)do  not significantly interfere with this assay.   However, presence of multiple variants may affect accuracy.       Past Medical History:   Past Medical History:   Diagnosis Date    Abnormal glandular  "Zheng Talley presented for a follow-up Medicare AWV today. The following components were reviewed and updated:    Medical history  Family History  Social history  Allergies and Current Medications  Health Risk Assessment  Health Maintenance  Care Team    See Completed Assessments for Annual Wellness visit with in the encounter summary    The following assessments were completed:  Depression Screening  Cognitive function Screening  Timed Get Up Test  Whisper Test  Nutrition  Activities of Daily Living   PAQ      Vitals:    10/25/23 0952   BP: 127/80   BP Location: Left arm   Patient Position: Sitting   BP Method: Large (Automatic)   Pulse: 63   SpO2: 97%   Weight: 109.5 kg (241 lb 6.5 oz)   Height: 5' 10" (1.778 m)     Body mass index is 34.64 kg/m².   ]    Review for Opioid Screening: Pt does not have Rx for Opioids.  Review for Substance Use Disorders: Patient does not use substance.    Physical Exam  Constitutional:       Appearance: Normal appearance.   Pulmonary:      Effort: Pulmonary effort is normal. No respiratory distress.   Abdominal:      General: Bowel sounds are normal. There is no distension.      Tenderness: There is no abdominal tenderness. There is no guarding.   Musculoskeletal:         General: Normal range of motion.      Cervical back: Normal range of motion.   Skin:     General: Skin is warm and dry.      Capillary Refill: Capillary refill takes 2 to 3 seconds.   Neurological:      Mental Status: He is alert and oriented to person, place, and time.      Gait: Gait abnormal.        Diagnoses and health risks identified today and associated recommendations/orders:  1. Encounter for preventive health examination  Screenings performed,  as noted above. Personal preventive testing needs reviewed.     2. Primary hypertension  Stable, followed by PCP.     3. Carpal tunnel syndrome of left wrist  Stable, followed by PCP.     4. Chronic pain disorder  Stable, followed by PCP.     5. Spondylosis of " Papanicolaou smear of cervix 2018 10:09:15 AM    [facility]: Abnormal PAP Smear-No Additional Notes    Abnormal glandular Papanicolaou smear of cervix 2018 10:09:15 AM    [facility]: Abnormal PAP Smear-No Additional Notes    Hormone replacement therapy (HRT)     Human papillomavirus in conditions classified elsewhere and of unspecified site 10/6/2020 8:36:36 AM    [facility] - [facility]: HPV in female-No Additional Notes    Human papillomavirus in conditions classified elsewhere and of unspecified site 10/6/2020 8:36:36 AM    [facility] - [facility]: HPV in female-No Additional Notes    Hyperlipidemia     Other specified noninflammatory disorder of vagina 2018 9:52:03 AM    [facility] - [facility]: Vaginal lesion-No Additional Notes    Other specified noninflammatory disorder of vagina 2018 9:52:03 AM    [facility] - [facility]: Vaginal Lesion-No Additional Notes     Past Surgical History:   Procedure Laterality Date     SECTION      HYSTERECTOMY       Family History   Problem Relation Age of Onset    Hypertension Mother     Bipolar disorder Father     Cirrhosis Father     Kidney failure Father     Mental illness Father     Ovarian cancer Maternal Grandmother     Cancer Maternal Grandmother     Depression Paternal Grandmother      Social History     Socioeconomic History    Marital status:      Spouse name: Roger    Number of children: 2   Occupational History     Comment: .    Tobacco Use    Smoking status: Never     Passive exposure: Never    Smokeless tobacco: Never   Substance and Sexual Activity    Alcohol use: Yes     Alcohol/week: 1.0 standard drink     Types: 1 Glasses of wine per week     Comment: wine    Drug use: No    Sexual activity: Not Currently     Social Determinants of Health     Financial Resource Strain: Unknown    Difficulty of Paying Living Expenses:  lumbar spine  Stable, followed by PCP.     6. Nuclear sclerosis of both eyes  Stable, followed by PCP.     7. Hyperlipidemia LDL goal <130  Stable, followed by PCP.     8. Bilateral impacted cerumen  - Ambulatory referral/consult to ENT; Future    I offered to discuss advanced care planning, including how to pick a person who would make decisions for you if you were unable to make them for yourself, called a health care power of , and what kind of decisions you might make such as use of life sustaining treatments such as ventilators and tube feeding when faced with a life limiting illness recorded on a living will that they will need to know. (How you want to be cared for as you near the end of your natural life)     X Patient is interested in learning more about how to make advanced directives.  I provided them paperwork and offered to discuss this with them.    Provided Zheng with a 5-10 year written screening schedule and personal prevention plan. Recommendations were developed using the USPSTF age appropriate recommendations. Education, counseling, and referrals were provided as needed.  After Visit Summary printed and given to patient which includes a list of additional screenings\tests needed.    Follow up in about 1 year (around 10/25/2024), or Annual wellness examination.    Teetee Mccracken NP     Patient refused   Food Insecurity: Unknown    Worried About Running Out of Food in the Last Year: Patient refused    Ran Out of Food in the Last Year: Patient refused   Transportation Needs: Unknown    Lack of Transportation (Medical): Patient refused    Lack of Transportation (Non-Medical): Patient refused   Physical Activity: Unknown    Days of Exercise per Week: Patient refused   Stress: Unknown    Feeling of Stress : Patient refused   Social Connections: Unknown    Frequency of Communication with Friends and Family: Patient refused    Frequency of Social Gatherings with Friends and Family: Patient refused    Active Member of Clubs or Organizations: Patient refused    Attends Club or Organization Meetings: Patient refused    Marital Status:    Housing Stability: Unknown    Unable to Pay for Housing in the Last Year: Patient refused    Number of Places Lived in the Last Year: 1    Unstable Housing in the Last Year: Patient refused     Medication List with Changes/Refills   Current Medications    EPINEPHRINE (EPIPEN) 0.3 MG/0.3 ML ATIN    Inject 0.3 mLs (0.3 mg total) into the muscle once. If symptoms not improved repeat in about 5-15 minutes - inject a second dose (pen) of 0.3 mL into muscle once. for 1 dose    FEXOFENADINE (ALLEGRA) 180 MG TABLET    Take 180 mg by mouth once daily.     Review of patient's allergies indicates:   Allergen Reactions    Tomato (solanum lycopersicum) Anaphylaxis    Cpd vehicle sol.sugarfree no.1      Agave    Shellfish containing products Itching    Sulfites     Tomato     Sulfa (sulfonamide antibiotics) Rash       Physical Exam:   Body mass index is 27.2 kg/m².    GENERAL: Well appearing, in no acute distress.  HEAD: Normocephalic and atraumatic.  ENT: External ears and nose grossly normal.  EYES: EOMI bilaterally  PULMONARY: Respirations are grossly even and non-labored.  NEURO: Awake, alert, and oriented x 3.  SKIN: No obvious rashes appreciated.  PSYCH: Mood & affect are  appropriate.    Detailed MSK exam:   R foot:   No obvious deformities, no ecchymosis, no erythema. No effusion. Limited dorsiflexion due to pain. 5/5 strength at foot and toes. Tender to palpate calcaneal tubercle, plantar fascia. Squeeze negative. Bump positive. Talar tilt negative . Kleiger negative.  Martin negative. Single leg heel raise negative for pain. Positive windlass maneuver.     L foot:   No obvious deformities, no ecchymosis, no erythema. No effusion. Normal ROM. 5/5 strength at foot and toes. Tender to palpate calcaneal tubercle, plantar fascia. Squeeze negative. Bump negative. Talar tilt negative . Kleiger negative.  Martin negative. Single leg heel raise negative for pain. Positive windlass maneuver.     R foot more symptomatic than left     N/V Rad Exam:  L1 Normal sensory (iliac crest)     L2-3 Normal sensory (anterior, medial thigh)   Normal motor (hip add)     L4 Normal sensory (lateral thigh, anterior knee, medial leg) Normal motor (knee ext)  L5 Normal sensory (lateral leg, dorsal foot)   Normal motor (toe DF, hip ext)  S1 Normal sensory (posterior leg)    Normal motor (PF, EV)  S2 Normal sensory (plantar foot)     Normal motor (toe PF)   Normal pedal pulses, warm and well perfused with capillary refill < 2 sec  Patella tendon reflex normal  Achilles tendon reflex normal    Bladder and bowel function normal        Imaging:  X-Ray Foot Complete Bilateral  Narrative: EXAMINATION:  XR FOOT COMPLETE 3 VIEW BILATERAL    CLINICAL HISTORY:  Plantar fascial fibromatosis    TECHNIQUE:  AP, lateral, and oblique views of both feet were performed.    COMPARISON:  None    FINDINGS:  No acute abnormality or significant arthritic change.  Left plantar and dorsal in right plantar calcaneal enthesophyte changes.  Impression: As above    Electronically signed by: Humberto Romo MD  Date:    01/30/2023  Time:    14:18    FOCUSED:  1. Diagnostic Extremity - MSK-Sports Ultrasound was recommended due to  right heel pain.  2. Diagnostic Extremity - MSK-Sports Ultrasound Performed: Kashmir Sutton Extremity Study: right plantar fascia.    TECHNIQUE: Real time ultrasound examination of the right plantar fascia was performed with SonoSite Edge 2, 9-L MHz linear probe(s).     FINDINGS: The images are of adequate diagnostic quality with identification of all echogenic structures made except for the vascular structures unless otherwise noted. There is no sonographic evidence of periosteal abnormalities, soft tissue edema, musculotendinous or nerve irregularities.  The right plantar fascia was visualized in long and short axis.  There are mild cortical abnormalities appreciated with definitive thickening at the plantar fascia origin.  There was tender to palpation ultrasound probe.  Difficult to assess any underlying significant hypoechoic changes on imaging today.  Right plantar fascia measured 0.6 cm at its thickest were contralateral measured 0.4 cm.. The rest of the sonographic examination was unremarkable.  Ultrasound images were directly reviewed with the patient and then a treatment plan was discussed.  The images were saved and stored for documentation in the EMR.     IMPRESSION:  Plantar fasciitis of the right plantar fascia origin measuring 0.6 cm in thickness    Relevant imaging results were reviewed and interpreted by me and per my read as above.  This was discussed with the patient and / or family today.     Assessment:  Alma Vivar is a 57 y.o. female presents today for plantar fascia the right heel.  Point of care ultrasound shows thickening of the plantar fascia as well.  Discussed the diagnosis prognosis as well as conservative treatment options moving forward.  Discussed straw spurt sock and splinting at night as well as gel heel cups good stiff-soled shoes avoiding barefoot continue home exercises stretching in the meantime.  We will trial least another 4-6 weeks of formal therapy and activity  and if still having symptoms could consider interventional measures including PRP verses minimally invasive tendon debridement.    Bilateral plantar fasciitis         A copy of today's visit note has been sent to the referring provider.       Kashmir Sutton MD    Disclaimer: This note was prepared using a voice recognition system and is likely to have sound alike errors within the text.

## 2024-04-03 ENCOUNTER — PATIENT MESSAGE (OUTPATIENT)
Dept: PULMONOLOGY | Facility: CLINIC | Age: 59
End: 2024-04-03
Payer: COMMERCIAL

## 2024-05-06 ENCOUNTER — PATIENT MESSAGE (OUTPATIENT)
Dept: PULMONOLOGY | Facility: CLINIC | Age: 59
End: 2024-05-06
Payer: COMMERCIAL

## 2024-05-08 ENCOUNTER — OFFICE VISIT (OUTPATIENT)
Dept: PULMONOLOGY | Facility: CLINIC | Age: 59
End: 2024-05-08
Payer: COMMERCIAL

## 2024-05-08 VITALS
BODY MASS INDEX: 25.81 KG/M2 | RESPIRATION RATE: 16 BRPM | WEIGHT: 136.69 LBS | DIASTOLIC BLOOD PRESSURE: 70 MMHG | OXYGEN SATURATION: 98 % | SYSTOLIC BLOOD PRESSURE: 120 MMHG | HEIGHT: 61 IN | HEART RATE: 71 BPM

## 2024-05-08 DIAGNOSIS — G47.33 OSA ON CPAP: Primary | ICD-10-CM

## 2024-05-08 DIAGNOSIS — E66.3 OVERWEIGHT (BMI 25.0-29.9): ICD-10-CM

## 2024-05-08 PROCEDURE — 1159F MED LIST DOCD IN RCRD: CPT | Mod: CPTII,S$GLB,, | Performed by: NURSE PRACTITIONER

## 2024-05-08 PROCEDURE — 99999 PR PBB SHADOW E&M-EST. PATIENT-LVL III: CPT | Mod: PBBFAC,,, | Performed by: NURSE PRACTITIONER

## 2024-05-08 PROCEDURE — 99213 OFFICE O/P EST LOW 20 MIN: CPT | Mod: S$GLB,,, | Performed by: NURSE PRACTITIONER

## 2024-05-08 PROCEDURE — 3008F BODY MASS INDEX DOCD: CPT | Mod: CPTII,S$GLB,, | Performed by: NURSE PRACTITIONER

## 2024-05-08 PROCEDURE — 1160F RVW MEDS BY RX/DR IN RCRD: CPT | Mod: CPTII,S$GLB,, | Performed by: NURSE PRACTITIONER

## 2024-05-08 PROCEDURE — 3074F SYST BP LT 130 MM HG: CPT | Mod: CPTII,S$GLB,, | Performed by: NURSE PRACTITIONER

## 2024-05-08 PROCEDURE — 3078F DIAST BP <80 MM HG: CPT | Mod: CPTII,S$GLB,, | Performed by: NURSE PRACTITIONER

## 2024-05-08 RX ORDER — MINERAL OIL
1 ENEMA (ML) RECTAL EVERY MORNING
COMMUNITY

## 2024-05-08 NOTE — PROGRESS NOTES
Subjective:      Patient ID: Alma Vivar is a 58 y.o. female.    Patient Active Problem List   Diagnosis    Left hand weakness    Swelling of left hand    COLTEN on CPAP    Allergic rhinitis    Overweight (BMI 25.0-29.9)       she has been referred by No ref. provider found for evaluation and management for   Chief Complaint   Patient presents with    Sleep Apnea       Chief Complaint: Sleep Apnea    HPI:  HPI: Alma Vivar is here for follow up for COLTEN and CPAP complaince assessment.   She is on Auto CPAP of 6 - 12 cmH2O pressure which is optimally controlling sleep apnea with apneic index (AHI) 0.3 events an hour.   She is compliant with CPAP use. Complaince download today reveals 100% of days with greater than 4 hours of device use. 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   Patient reports benefit from CPAP use and denies snoring and excessive daytime sleepiness, awakens more refreshed and fatigued if does not wear CPAP.   Patient reports no complaints. Nasal wisp mask is used.      [facility] [employer] [employer] phone: [phone]   [facility] - iBreeze AutoPAP--No Modem  Set Up Date: 10/4/22    8/14/2022 Home Sleep Study 1 night study MILD/BORDERLINE OBSTRUCTIVE SLEEP APNEA with overall AHI 9.3/hr ( 69 events): night #1. Oxygen desaturation: < 70 %. SpO2 between 90% to 94% for 1 hr 14 min.    12/16/2022 changed in clinic to Auto CPAP 6-12  cm since patient complaining of mask leak at times and wanted more air at start up.         Sage Sleepiness Scale       5/8/2024     8:52 AM 4/4/2023     8:36 AM 12/16/2022     9:19 AM 9/23/2022    10:00 AM   EPWORTH SLEEPINESS SCALE   Sitting and reading 0 0 0 0   Watching TV 0 0 0 1   Sitting, inactive in a public place (e.g. a theatre or a meeting) 0 0 0 0   As a passenger in a car for an hour without a break 3 3 2 3   Lying down to rest in the afternoon when  circumstances permit 1 1 0 3   Sitting and talking to someone 0 0 0 0   Sitting quietly after a lunch without alcohol 0 0 0 0   In a car, while stopped for a few minutes in traffic 0 0 0 0   Total score 4 4 2 7     Previous Report Reviewed: lab reports and office notes     Past Medical History: The following portions of the patient's history were reviewed and updated as appropriate:   She  has a past surgical history that includes Hysterectomy and  section.  Her family history includes Bipolar disorder in her father; Cancer in her maternal grandmother; Cirrhosis in her father; Depression in her paternal grandmother; Hypertension in her mother; Kidney failure in her father; Mental illness in her father; Ovarian cancer in her maternal grandmother.  She  reports that she has never smoked. She has never been exposed to tobacco smoke. She has never used smokeless tobacco. She reports current alcohol use of about 1.0 standard drink of alcohol per week. She reports that she does not use drugs.  She has a current medication list which includes the following prescription(s): epinephrine and fexofenadine.  She is allergic to tomato (solanum lycopersicum), cpd vehicle sol.sugarfree no.1, shellfish containing products, sulfites, tomato, and sulfa (sulfonamide antibiotics)..    Review of Systems   Constitutional:  Negative for fever, chills, weight loss, weight gain, activity change, appetite change, fatigue and night sweats.   HENT:  Negative for postnasal drip, rhinorrhea, sinus pressure, voice change and congestion.    Eyes:  Negative for redness and itching.   Respiratory:  Negative for apnea, snoring, cough, sputum production, chest tightness, shortness of breath, wheezing, orthopnea, asthma nighttime symptoms, dyspnea on extertion, use of rescue inhaler and somnolence.    Cardiovascular: Negative.  Negative for chest pain, palpitations and leg swelling.   Genitourinary:  Negative for difficulty urinating and  "hematuria.   Endocrine:  Negative for cold intolerance and heat intolerance.    Musculoskeletal:  Negative for arthralgias, gait problem, joint swelling and myalgias.   Skin: Negative.    Gastrointestinal:  Negative for nausea, vomiting, abdominal pain and acid reflux.   Neurological:  Negative for dizziness, weakness, light-headedness and headaches.   Hematological:  Negative for adenopathy. No excessive bruising.   All other systems reviewed and are negative.     Objective:   /70   Pulse 71   Resp 16   Ht 5' 1" (1.549 m)   Wt 62 kg (136 lb 11 oz)   SpO2 98%   BMI 25.83 kg/m²   Physical Exam  Vitals and nursing note reviewed.   Constitutional:       General: She is not in acute distress.     Appearance: Normal appearance. She is well-developed. She is not ill-appearing or toxic-appearing.   HENT:      Head: Normocephalic.      Right Ear: External ear normal.      Left Ear: External ear normal.      Nose: Nose normal.      Mouth/Throat:      Pharynx: No oropharyngeal exudate.   Eyes:      Conjunctiva/sclera: Conjunctivae normal.   Cardiovascular:      Rate and Rhythm: Normal rate and regular rhythm.      Heart sounds: Normal heart sounds.   Pulmonary:      Effort: Pulmonary effort is normal.      Breath sounds: Normal breath sounds. No stridor.   Abdominal:      Palpations: Abdomen is soft.   Musculoskeletal:         General: Normal range of motion.      Cervical back: Normal range of motion and neck supple.   Lymphadenopathy:      Cervical: No cervical adenopathy.   Skin:     General: Skin is warm and dry.   Neurological:      Mental Status: She is alert and oriented to person, place, and time.   Psychiatric:         Behavior: Behavior normal. Behavior is cooperative.         Thought Content: Thought content normal.         Judgment: Judgment normal.         Personal Diagnostic Review  Review of labs, xray's, cardiology reports.     Assessment:     1. COLTEN on CPAP    2. Overweight (BMI 25.0-29.9)  "         Orders Placed This Encounter   Procedures    CPAP/BIPAP SUPPLIES     Benefits and compliant  90 day supply. 4 refills  HME: [facility]     Order Specific Question:   Length of need (1-99 months):     Answer:   99     Order Specific Question:   Choose ONE mask type and its corresponding cushions and/or pillows:     Answer:    Nasal Mask, 1 per 90 days:  Nasal Cushions, (6 per 90 days):  Nasal Pillows, (6 per 90 days)     Order Specific Question:   Choose EITHER Heated or Non-Heated Tubjing     Answer:    Non-Heated Tubing, 1 per 90 days     Order Specific Question:   Number of Days Needed:     Answer:   99     Order Specific Question:   All other supplies as needed as listed below:     Answer:    Headgear, 1 per 180 days     Order Specific Question:   All other supplies as needed as listed below:     Answer:    Chin Strap, 1 per 180 days     Order Specific Question:   All other supplies as needed as listed below:     Answer:    Disposable Filter, 6 per 90 days     Order Specific Question:   All other supplies as needed as listed below:     Answer:    Humidifier Chamber, 1 per 180 days     Order Specific Question:   All other supplies as needed as listed below:     Answer:    Non-Disposable Filter, 1 per 180 days     Plan:   Discussed diagnosis, its evaluation, treatment and usual course. All questions answered.  Problem List Items Addressed This Visit       Overweight (BMI 25.0-29.9)     Continue to encourage exercise and dietary modification to obtain normal weight.   Wt Readings from Last 9 Encounters:   05/08/24 62 kg (136 lb 11 oz)   10/17/23 63 kg (139 lb)   06/06/23 64.3 kg (141 lb 12.1 oz)   04/04/23 64.9 kg (143 lb 1.3 oz)   01/30/23 65.3 kg (143 lb 15.4 oz)   12/16/22 63.4 kg (139 lb 12.4 oz)   12/06/22 63.9 kg (140 lb 14 oz)   11/28/22 63.7 kg (140 lb 6.9 oz)   11/18/22 64.5 kg (142 lb 3.2 oz)   Body mass index is 25.83 kg/m².           COLTEN on CPAP - Primary      Benefits and compliant with Auto CPAP  5-20 cm with optimal control AHI 0.3  [facility]ent AutoPAP--No Modem  Set Up Date: 10/4/22  Continue Auto CPAP 6-12 cm   Nasal wisp mask  HME [facility]   Follow up as needed, patient is planning move to [place]             Relevant Orders    CPAP/BIPAP SUPPLIES     I spent a total of 26 minutes on the day of the visit.  This includes face to face time and non-face to face time preparing to see the patient (eg, review of tests), obtaining and/or reviewing separately obtained history, documenting clinical information in the electronic or other health record, independently interpreting results and communicating results to the patient/family/caregiver, or care coordinator.    Follow up for pt will fu as needed, she is planning move to [place] .    Thank you for the opportunity to participate in the care of this patient.

## 2024-05-08 NOTE — ASSESSMENT & PLAN NOTE
Benefits and compliant with Auto CPAP  5-20 cm with optimal control AHI 0.3  ResVent AutoPAP--No Modem  Set Up Date: 10/4/22  Continue Auto CPAP 6-12 cm   Nasal wisp mask  H[facility]   Follow up as needed, patient is planning move to [place]

## 2024-05-08 NOTE — ASSESSMENT & PLAN NOTE
Continue to encourage exercise and dietary modification to obtain normal weight.   Wt Readings from Last 9 Encounters:   05/08/24 62 kg (136 lb 11 oz)   10/17/23 63 kg (139 lb)   06/06/23 64.3 kg (141 lb 12.1 oz)   04/04/23 64.9 kg (143 lb 1.3 oz)   01/30/23 65.3 kg (143 lb 15.4 oz)   12/16/22 63.4 kg (139 lb 12.4 oz)   12/06/22 63.9 kg (140 lb 14 oz)   11/28/22 63.7 kg (140 lb 6.9 oz)   11/18/22 64.5 kg (142 lb 3.2 oz)   Body mass index is 25.83 kg/m².

## 2024-05-29 DIAGNOSIS — R94.31 ABNORMAL ECG DURING EXERCISE STRESS TEST: Primary | ICD-10-CM

## 2024-06-07 ENCOUNTER — HOSPITAL ENCOUNTER (OUTPATIENT)
Dept: CARDIOLOGY | Facility: HOSPITAL | Age: 59
Discharge: HOME | End: 2024-06-07
Attending: STUDENT IN AN ORGANIZED HEALTH CARE EDUCATION/TRAINING PROGRAM
Payer: COMMERCIAL

## 2024-06-07 ENCOUNTER — OFFICE VISIT (OUTPATIENT)
Dept: CARDIOLOGY | Facility: CLINIC | Age: 59
End: 2024-06-07
Payer: COMMERCIAL

## 2024-06-07 VITALS
DIASTOLIC BLOOD PRESSURE: 80 MMHG | HEIGHT: 61 IN | HEART RATE: 60 BPM | SYSTOLIC BLOOD PRESSURE: 118 MMHG | WEIGHT: 140.44 LBS | BODY MASS INDEX: 26.51 KG/M2

## 2024-06-07 DIAGNOSIS — G47.33 OSA ON CPAP: ICD-10-CM

## 2024-06-07 DIAGNOSIS — E78.00 ELEVATED LDL CHOLESTEROL LEVEL: ICD-10-CM

## 2024-06-07 DIAGNOSIS — R07.9 CHEST PAIN, UNSPECIFIED TYPE: Primary | ICD-10-CM

## 2024-06-07 DIAGNOSIS — R94.31 ABNORMAL ECG DURING EXERCISE STRESS TEST: ICD-10-CM

## 2024-06-07 DIAGNOSIS — R03.0 ELEVATED BLOOD PRESSURE READING: ICD-10-CM

## 2024-06-07 DIAGNOSIS — G47.33 OSA (OBSTRUCTIVE SLEEP APNEA): ICD-10-CM

## 2024-06-07 DIAGNOSIS — E78.00 ELEVATED LDL CHOLESTEROL LEVEL: Primary | ICD-10-CM

## 2024-06-07 PROCEDURE — 93010 ELECTROCARDIOGRAM REPORT: CPT | Mod: ,,, | Performed by: INTERNAL MEDICINE

## 2024-06-07 PROCEDURE — 3074F SYST BP LT 130 MM HG: CPT | Mod: CPTII,S$GLB,, | Performed by: STUDENT IN AN ORGANIZED HEALTH CARE EDUCATION/TRAINING PROGRAM

## 2024-06-07 PROCEDURE — 93005 ELECTROCARDIOGRAM TRACING: CPT | Mod: PO

## 2024-06-07 PROCEDURE — 3008F BODY MASS INDEX DOCD: CPT | Mod: CPTII,S$GLB,, | Performed by: STUDENT IN AN ORGANIZED HEALTH CARE EDUCATION/TRAINING PROGRAM

## 2024-06-07 PROCEDURE — 99999 PR PBB SHADOW E&M-EST. PATIENT-LVL III: CPT | Mod: PBBFAC,,, | Performed by: STUDENT IN AN ORGANIZED HEALTH CARE EDUCATION/TRAINING PROGRAM

## 2024-06-07 PROCEDURE — 3079F DIAST BP 80-89 MM HG: CPT | Mod: CPTII,S$GLB,, | Performed by: STUDENT IN AN ORGANIZED HEALTH CARE EDUCATION/TRAINING PROGRAM

## 2024-06-07 PROCEDURE — 99214 OFFICE O/P EST MOD 30 MIN: CPT | Mod: S$GLB,,, | Performed by: STUDENT IN AN ORGANIZED HEALTH CARE EDUCATION/TRAINING PROGRAM

## 2024-06-07 PROCEDURE — 1159F MED LIST DOCD IN RCRD: CPT | Mod: CPTII,S$GLB,, | Performed by: STUDENT IN AN ORGANIZED HEALTH CARE EDUCATION/TRAINING PROGRAM

## 2024-06-07 NOTE — PROGRESS NOTES
Section of Cardiology                  Cardiac Clinic Note    Chief Complaint/Reason for consultation:  Chest pain      HPI:   Alma Vivar is a 58 y.o. female with h/o no significant PMH who was referred to cardiology clinic by ADELAIDA Romo for evaluation.      7/26/2022  Has been seen in the ED for chest pain/shortness of breath symptoms x2, negative workup  First episode of chest pain was in [place], about 3 weeks ago, had a pulling sensation in her chest with SOB (can't take deep breaths)  Got poison ivy (allergic) anhad to take prednisone (symptoms started when taking prednisone) for 2 weeks   Other symptoms occur with waking up   Get LH at time and palpitations also  Symptoms last hours   Symptoms don't worsen with activity  Exercises regularly, but has stopped due to symptoms, at least 4 days a week   She is an [employer] speciality, travels for work and family    Active at home and generally     denies tobacco abuse. ETOH occ  Family hx: HTN, cancer     Denies syncope.  Denies DM, HTN, CVA      8/30/22  ETT 7/22 abnormal   Nuclear stress test 8/22 without ischemia- normal exercise capacity  Echocardiogram with normal EF  Blood pressure on a nuclear stress at rest 142/80  Has not had any chest pain  BP normal today   Would like to start exercising again- was holding off until results came back  Being worked up for sleep apnea     Denies SOB, syncope, orthopnea        12/6/22  Doing well  Weight decreased 2 lbs  Has been active  Started on CPAP for COLTEN  Says she's sleeping better, has more engergy    Denies chest pain, SOB      6/6/23  Doing well  Intermittent palpitations as night while laying down, after eating  Lost 2 lbs since 1/23  Curious about her cholesterol   Has been changing diet   Tolerating CPAP- reports sleeping better     Denies chest pain, SOB      6/7/24  Doing well   Will be moving to [facility] where most of her family is   Has not had any shortness of breath, chest  pain, dizziness, palpitations   Blood pressure stable   Would like to get her cholesterol checked today   avoids fried foods, foods high in cholesterol  Weight is stable high 130s-140s lbs    EKG 23 NSR  EKG 2022 NSR, no acute ST - T wave changes    ECHO    The left ventricle is normal in size with normal systolic function.  The estimated ejection fraction is 65%.  Normal left ventricular diastolic function.  Normal right ventricular size with normal right ventricular systolic function.  Mild tricuspid regurgitation.  Normal central venous pressure (3 mmHg).  The estimated PA systolic pressure is 21 mmHg.    STRESS TEST    [facility]      ROS: All 10 systems reviewed. Please refer to the HPI for pertinent positives. All other systems negative.     Past Medical History  Past Medical History:   Diagnosis Date    Abnormal glandular Papanicolaou smear of cervix 2018 10:09:15 AM    [facility]: Abnormal PAP Smear-No Additional Notes    Abnormal glandular Papanicolaou smear of cervix 2018 10:09:15 AM    [facility]: Abnormal PAP Smear-No Additional Notes    Hormone replacement therapy (HRT)     Human papillomavirus in conditions classified elsewhere and of unspecified site 10/6/2020 8:36:36 AM    [facility]: HPV in female-No Additional Notes    Human papillomavirus in conditions classified elsewhere and of unspecified site 10/6/2020 8:36:36 AM    [facility]: HPV in female-No Additional Notes    Hyperlipidemia     Other specified noninflammatory disorder of vagina 2018 9:52:03 AM    [facility]: Vaginal lesion-No Additional Notes    Other specified noninflammatory disorder of vagina 2018 9:52:03 AM    [facility]: Vaginal Lesion-No Additional Notes       Surgical History  Past Surgical History:   Procedure Laterality Date     SECTION      HYSTERECTOMY             Allergies:   Review of patient's allergies indicates:   Allergen Reactions    Tomato (solanum lycopersicum) Anaphylaxis    Cpd vehicle sol.sugarfree no.1      Agave    Shellfish containing products Itching    Sulfites     Tomato     Sulfa (sulfonamide antibiotics) Rash       Social History:  Social History     Socioeconomic History    Marital status:      Spouse name: Roger    Number of children: 2   Occupational History     Comment: .    Tobacco Use    Smoking status: Never     Passive exposure: Never    Smokeless tobacco: Never   Substance and Sexual Activity    Alcohol use: Yes     Alcohol/week: 1.0 standard drink of alcohol     Types: 1 Glasses of wine per week     Comment: wine    Drug use: No    Sexual activity: Not Currently     Social Determinants of Health     Financial Resource Strain: Unknown (8/17/2022)    Overall Financial Resource Strain (CARDIA)     Difficulty of Paying Living Expenses: Patient declined   Food Insecurity: Unknown (8/17/2022)    Hunger Vital Sign     Worried About Running Out of Food in the Last Year: Patient declined     Ran Out of Food in the Last Year: Patient declined   Transportation Needs: Unknown (8/17/2022)    PRAPARE - Transportation     Lack of Transportation (Medical): Patient declined     Lack of Transportation (Non-Medical): Patient declined   Physical Activity: Unknown (8/17/2022)    Exercise Vital Sign     Days of Exercise per Week: Patient declined   Stress: Unknown (8/17/2022)    [employer] - Occupational Stress Questionnaire     Feeling of Stress : Patient declined   Housing Stability: Unknown (8/17/2022)    Housing Stability Vital Sign     Unable to Pay for Housing in the Last Year: Patient refused     Number of Places Lived in the Last Year: 1     Unstable Housing in the Last Year: Patient refused       Family History:  family history includes Bipolar disorder in her father; Cancer in her maternal  "grandmother; Cirrhosis in her father; Depression in her paternal grandmother; Hypertension in her mother; Kidney failure in her father; Mental illness in her father; Ovarian cancer in her maternal grandmother.    Home Medications:  Current Outpatient Medications on File Prior to Visit   Medication Sig Dispense Refill    EPINEPHrine (EPIPEN) 0.3 mg/0.3 mL AtIn Inject 0.3 mLs (0.3 mg total) into the muscle once. If symptoms not improved repeat in about 5-15 minutes - inject a second dose (pen) of 0.3 mL into muscle once. for 1 dose 2 each 2    fexofenadine (ALLEGRA) 180 MG tablet Take 1 tablet by mouth every morning.       No current facility-administered medications on file prior to visit.       Physical exam:  /80 (BP Location: Left arm, Patient Position: Sitting, BP Method: Small (Manual))   Pulse 60   Ht 5' 1" (1.549 m)   Wt 63.7 kg (140 lb 6.9 oz)   BMI 26.53 kg/m²         General: Pt is a 58 y.o. year old female who is AAOx3, in NAD, is pleasant, well nourished, looks stated age  HEENT: PERRL, EOMI, Oral mucosa pink & moist  CVS  No abnormal cardiac pulsations noted on inspection. JVP not raised. The apical impulse is normal on palpation, and is located in the left 5th intercostal space in the mid - clavicular line. No palpable thrills or abnormal pulsations noted. RR, S1 - S2 heard, no murmurs, rubs or gallops appreciated.   PUL : CTA B/L. No wheezes/crackles heard   ABD : BS +, soft. No tenderness elicited   LE : No C/C/E. Distal Pulses palpable B/L         LABS:    Chemistry:   Lab Results   Component Value Date     07/20/2022    K 5.0 07/20/2022     07/20/2022    CO2 23 07/20/2022    BUN 17 07/20/2022    CREATININE 0.9 07/20/2022    CALCIUM 9.2 07/20/2022     Cardiac Markers:   Lab Results   Component Value Date    TROPONINI <0.006 07/20/2022     Cardiac Markers (Last 3):   Lab Results   Component Value Date    TROPONINI <0.006 07/20/2022     CBC:   Lab Results   Component Value Date    " "WBC 5.61 09/27/2022    HGB 13.4 09/27/2022    HCT 41.6 09/27/2022    MCV 94 09/27/2022     09/27/2022     Lipids:   Lab Results   Component Value Date    CHOL 216 (H) 06/09/2023    TRIG 95 06/09/2023    HDL 54 06/09/2023    HDL 51 08/07/2015     Coagulation: No results found for: "PT", "INR", "APTT"        Assessment      1. Chest pain, unspecified type    2. Elevated LDL cholesterol level    3. COLTEN on CPAP    4. COLTEN (obstructive sleep apnea)    5. Elevated blood pressure reading        Plan:     Chest pain- resolved  ETT 7/22 abnormal   Nuclear stress test 8/22 without ischemia  Echocardiogram 8/22 with normal EF    COLTEN  continue CPAP machine    Elevated LDL level  Reports diet and exercise, does not eat much protein  10 year ASCVD risk 2.3%  Repeat lipid panel     This note was prepared using voice recognition system and is likely to have sound alike errors that may have been overlooked even after proofreading.     I have reviewed all pertinent chart information.  Plans and recommendations have been formulated under my direct supervision. All questions answered and patient voiced understanding.   If symptoms persist go to the ED.    RTC in 1 year         Letty Matthews MD  Cardiology                "

## 2024-06-10 LAB
OHS QRS DURATION: 72 MS
OHS QTC CALCULATION: 416 MS

## 2025-01-03 ENCOUNTER — PATIENT OUTREACH (OUTPATIENT)
Dept: ADMINISTRATIVE | Facility: HOSPITAL | Age: 60
End: 2025-01-03
Payer: COMMERCIAL

## 2025-02-17 ENCOUNTER — PATIENT MESSAGE (OUTPATIENT)
Dept: PULMONOLOGY | Facility: CLINIC | Age: 60
End: 2025-02-17
Payer: COMMERCIAL